# Patient Record
Sex: FEMALE | Race: WHITE | HISPANIC OR LATINO | Employment: UNEMPLOYED | URBAN - METROPOLITAN AREA
[De-identification: names, ages, dates, MRNs, and addresses within clinical notes are randomized per-mention and may not be internally consistent; named-entity substitution may affect disease eponyms.]

---

## 2018-08-01 ENCOUNTER — APPOINTMENT (EMERGENCY)
Dept: RADIOLOGY | Facility: HOSPITAL | Age: 53
DRG: 194 | End: 2018-08-01
Payer: COMMERCIAL

## 2018-08-01 ENCOUNTER — HOSPITAL ENCOUNTER (INPATIENT)
Facility: HOSPITAL | Age: 53
LOS: 2 days | Discharge: HOME | DRG: 194 | End: 2018-08-03
Attending: EMERGENCY MEDICINE | Admitting: INTERNAL MEDICINE
Payer: COMMERCIAL

## 2018-08-01 DIAGNOSIS — I50.9 ACUTE CHF (CONGESTIVE HEART FAILURE) (HCC): Primary | ICD-10-CM

## 2018-08-01 DIAGNOSIS — I50.33 ACUTE ON CHRONIC DIASTOLIC HEART FAILURE (HCC): ICD-10-CM

## 2018-08-01 PROBLEM — I10 HYPERTENSION: Status: ACTIVE | Noted: 2018-08-01

## 2018-08-01 LAB
ALBUMIN SERPL BCP-MCNC: 3.7 G/DL (ref 3.5–5)
ALP SERPL-CCNC: 115 U/L (ref 46–116)
ALT SERPL W P-5'-P-CCNC: 28 U/L (ref 12–78)
ANION GAP SERPL CALCULATED.3IONS-SCNC: 7 MMOL/L (ref 4–13)
APTT PPP: 27 SECONDS (ref 24–36)
AST SERPL W P-5'-P-CCNC: 26 U/L (ref 5–45)
ATRIAL RATE: 83 BPM
BACTERIA UR QL AUTO: ABNORMAL /HPF
BASOPHILS # BLD AUTO: 0.1 THOUSANDS/ΜL (ref 0–0.1)
BASOPHILS NFR BLD AUTO: 1 % (ref 0–1)
BILIRUB DIRECT SERPL-MCNC: 0.04 MG/DL (ref 0–0.2)
BILIRUB SERPL-MCNC: 0.3 MG/DL (ref 0.2–1)
BILIRUB UR QL STRIP: NEGATIVE
BUN SERPL-MCNC: 13 MG/DL (ref 5–25)
CALCIUM SERPL-MCNC: 8.8 MG/DL (ref 8.3–10.1)
CHLORIDE SERPL-SCNC: 102 MMOL/L (ref 100–108)
CLARITY UR: CLEAR
CO2 SERPL-SCNC: 30 MMOL/L (ref 21–32)
COLOR UR: YELLOW
CREAT SERPL-MCNC: 0.74 MG/DL (ref 0.6–1.3)
EOSINOPHIL # BLD AUTO: 0.4 THOUSAND/ΜL (ref 0–0.61)
EOSINOPHIL NFR BLD AUTO: 4 % (ref 0–6)
ERYTHROCYTE [DISTWIDTH] IN BLOOD BY AUTOMATED COUNT: 14.3 % (ref 11.6–15.1)
GFR SERPL CREATININE-BSD FRML MDRD: 95 ML/MIN/1.73SQ M
GLUCOSE SERPL-MCNC: 91 MG/DL (ref 65–140)
GLUCOSE UR STRIP-MCNC: NEGATIVE MG/DL
HCT VFR BLD AUTO: 40.4 % (ref 34.8–46.1)
HGB BLD-MCNC: 12.6 G/DL (ref 11.5–15.4)
HGB UR QL STRIP.AUTO: NEGATIVE
IMM GRANULOCYTES # BLD AUTO: 0.03 THOUSAND/UL (ref 0–0.2)
IMM GRANULOCYTES NFR BLD AUTO: 0 % (ref 0–2)
INR PPP: 1.07 (ref 0.86–1.17)
KETONES UR STRIP-MCNC: NEGATIVE MG/DL
LEUKOCYTE ESTERASE UR QL STRIP: ABNORMAL
LYMPHOCYTES # BLD AUTO: 4.75 THOUSANDS/ΜL (ref 0.6–4.47)
LYMPHOCYTES NFR BLD AUTO: 42 % (ref 14–44)
MCH RBC QN AUTO: 27.3 PG (ref 26.8–34.3)
MCHC RBC AUTO-ENTMCNC: 31.2 G/DL (ref 31.4–37.4)
MCV RBC AUTO: 88 FL (ref 82–98)
MONOCYTES # BLD AUTO: 1.06 THOUSAND/ΜL (ref 0.17–1.22)
MONOCYTES NFR BLD AUTO: 10 % (ref 4–12)
NEUTROPHILS # BLD AUTO: 4.85 THOUSANDS/ΜL (ref 1.85–7.62)
NEUTS SEG NFR BLD AUTO: 43 % (ref 43–75)
NITRITE UR QL STRIP: NEGATIVE
NON-SQ EPI CELLS URNS QL MICRO: ABNORMAL /HPF
NRBC BLD AUTO-RTO: 0 /100 WBCS
NT-PROBNP SERPL-MCNC: 1303 PG/ML
P AXIS: 65 DEGREES
PH UR STRIP.AUTO: 6.5 [PH] (ref 4.5–8)
PLATELET # BLD AUTO: 435 THOUSANDS/UL (ref 149–390)
PMV BLD AUTO: 9.9 FL (ref 8.9–12.7)
POTASSIUM SERPL-SCNC: 3.5 MMOL/L (ref 3.5–5.3)
PR INTERVAL: 152 MS
PROT SERPL-MCNC: 8.3 G/DL (ref 6.4–8.2)
PROT UR STRIP-MCNC: NEGATIVE MG/DL
PROTHROMBIN TIME: 13.8 SECONDS (ref 11.8–14.2)
QRS AXIS: 80 DEGREES
QRSD INTERVAL: 88 MS
QT INTERVAL: 420 MS
QTC INTERVAL: 493 MS
RBC # BLD AUTO: 4.61 MILLION/UL (ref 3.81–5.12)
RBC #/AREA URNS AUTO: ABNORMAL /HPF
SODIUM SERPL-SCNC: 139 MMOL/L (ref 136–145)
SP GR UR STRIP.AUTO: 1.01 (ref 1–1.03)
T WAVE AXIS: -47 DEGREES
TROPONIN I SERPL-MCNC: 0.02 NG/ML
UROBILINOGEN UR QL STRIP.AUTO: 1 E.U./DL
VENTRICULAR RATE: 83 BPM
WBC # BLD AUTO: 11.19 THOUSAND/UL (ref 4.31–10.16)
WBC #/AREA URNS AUTO: ABNORMAL /HPF

## 2018-08-01 PROCEDURE — 80076 HEPATIC FUNCTION PANEL: CPT | Performed by: PHYSICIAN ASSISTANT

## 2018-08-01 PROCEDURE — 81001 URINALYSIS AUTO W/SCOPE: CPT | Performed by: PHYSICIAN ASSISTANT

## 2018-08-01 PROCEDURE — 84484 ASSAY OF TROPONIN QUANT: CPT | Performed by: PHYSICIAN ASSISTANT

## 2018-08-01 PROCEDURE — 85730 THROMBOPLASTIN TIME PARTIAL: CPT | Performed by: PHYSICIAN ASSISTANT

## 2018-08-01 PROCEDURE — 83880 ASSAY OF NATRIURETIC PEPTIDE: CPT | Performed by: PHYSICIAN ASSISTANT

## 2018-08-01 PROCEDURE — 36415 COLL VENOUS BLD VENIPUNCTURE: CPT | Performed by: PHYSICIAN ASSISTANT

## 2018-08-01 PROCEDURE — 93010 ELECTROCARDIOGRAM REPORT: CPT | Performed by: INTERNAL MEDICINE

## 2018-08-01 PROCEDURE — 93005 ELECTROCARDIOGRAM TRACING: CPT

## 2018-08-01 PROCEDURE — 85025 COMPLETE CBC W/AUTO DIFF WBC: CPT | Performed by: PHYSICIAN ASSISTANT

## 2018-08-01 PROCEDURE — 71046 X-RAY EXAM CHEST 2 VIEWS: CPT

## 2018-08-01 PROCEDURE — 85610 PROTHROMBIN TIME: CPT | Performed by: PHYSICIAN ASSISTANT

## 2018-08-01 PROCEDURE — 80048 BASIC METABOLIC PNL TOTAL CA: CPT | Performed by: PHYSICIAN ASSISTANT

## 2018-08-01 RX ORDER — AMLODIPINE BESYLATE 10 MG/1
10 TABLET ORAL DAILY
COMMUNITY
Start: 2018-06-12 | End: 2018-11-02

## 2018-08-01 RX ORDER — FUROSEMIDE 10 MG/ML
40 INJECTION INTRAMUSCULAR; INTRAVENOUS ONCE
Status: COMPLETED | OUTPATIENT
Start: 2018-08-01 | End: 2018-08-02

## 2018-08-01 RX ORDER — VALACYCLOVIR HYDROCHLORIDE 500 MG/1
500 TABLET, FILM COATED ORAL
COMMUNITY
End: 2018-08-01

## 2018-08-01 RX ORDER — FUROSEMIDE 10 MG/ML
20 INJECTION INTRAMUSCULAR; INTRAVENOUS
Status: DISCONTINUED | OUTPATIENT
Start: 2018-08-02 | End: 2018-08-03 | Stop reason: HOSPADM

## 2018-08-01 RX ORDER — CITALOPRAM 20 MG/1
40 TABLET ORAL
Status: DISCONTINUED | OUTPATIENT
Start: 2018-08-01 | End: 2018-08-03 | Stop reason: HOSPADM

## 2018-08-01 RX ORDER — AMLODIPINE BESYLATE 10 MG/1
10 TABLET ORAL
Status: DISCONTINUED | OUTPATIENT
Start: 2018-08-01 | End: 2018-08-03 | Stop reason: HOSPADM

## 2018-08-01 RX ORDER — ACETAMINOPHEN 325 MG/1
650 TABLET ORAL EVERY 4 HOURS PRN
Status: DISCONTINUED | OUTPATIENT
Start: 2018-08-01 | End: 2018-08-03 | Stop reason: HOSPADM

## 2018-08-01 RX ORDER — ATORVASTATIN CALCIUM 40 MG/1
80 TABLET, FILM COATED ORAL
Status: DISCONTINUED | OUTPATIENT
Start: 2018-08-02 | End: 2018-08-03 | Stop reason: HOSPADM

## 2018-08-01 RX ORDER — ASPIRIN 81 MG/1
81 TABLET ORAL DAILY
COMMUNITY

## 2018-08-01 RX ORDER — ASPIRIN 81 MG/1
81 TABLET, CHEWABLE ORAL
Status: DISCONTINUED | OUTPATIENT
Start: 2018-08-01 | End: 2018-08-03 | Stop reason: HOSPADM

## 2018-08-01 RX ORDER — PANTOPRAZOLE SODIUM 40 MG/1
40 TABLET, DELAYED RELEASE ORAL DAILY
COMMUNITY
Start: 2018-06-12 | End: 2021-02-15 | Stop reason: SDUPTHER

## 2018-08-01 RX ORDER — CITALOPRAM 40 MG/1
40 TABLET ORAL DAILY
COMMUNITY
Start: 2018-06-12

## 2018-08-01 RX ORDER — PANTOPRAZOLE SODIUM 40 MG/1
40 TABLET, DELAYED RELEASE ORAL
Status: DISCONTINUED | OUTPATIENT
Start: 2018-08-02 | End: 2018-08-03 | Stop reason: HOSPADM

## 2018-08-01 RX ORDER — POTASSIUM CHLORIDE 20 MEQ/1
40 TABLET, EXTENDED RELEASE ORAL ONCE
Status: COMPLETED | OUTPATIENT
Start: 2018-08-01 | End: 2018-08-01

## 2018-08-01 RX ORDER — ATORVASTATIN CALCIUM 80 MG/1
80 TABLET, FILM COATED ORAL DAILY
COMMUNITY
Start: 2018-06-12 | End: 2018-11-02 | Stop reason: SDUPTHER

## 2018-08-01 RX ADMIN — ASPIRIN 81 MG 81 MG: 81 TABLET ORAL at 23:58

## 2018-08-01 RX ADMIN — POTASSIUM CHLORIDE 40 MEQ: 1500 TABLET, EXTENDED RELEASE ORAL at 23:58

## 2018-08-01 RX ADMIN — AMLODIPINE BESYLATE 10 MG: 10 TABLET ORAL at 23:58

## 2018-08-01 RX ADMIN — CITALOPRAM HYDROBROMIDE 40 MG: 20 TABLET ORAL at 23:58

## 2018-08-02 ENCOUNTER — APPOINTMENT (INPATIENT)
Dept: NON INVASIVE DIAGNOSTICS | Facility: HOSPITAL | Age: 53
DRG: 194 | End: 2018-08-02
Payer: COMMERCIAL

## 2018-08-02 PROBLEM — R94.31 ABNORMAL EKG: Status: ACTIVE | Noted: 2018-08-02

## 2018-08-02 PROBLEM — M79.89 FOOT SWELLING: Status: ACTIVE | Noted: 2018-08-02

## 2018-08-02 LAB
ANION GAP SERPL CALCULATED.3IONS-SCNC: 9 MMOL/L (ref 4–13)
BASOPHILS # BLD AUTO: 0.13 THOUSANDS/ΜL (ref 0–0.1)
BASOPHILS NFR BLD AUTO: 1 % (ref 0–1)
BUN SERPL-MCNC: 13 MG/DL (ref 5–25)
CALCIUM SERPL-MCNC: 9.1 MG/DL (ref 8.3–10.1)
CHLORIDE SERPL-SCNC: 100 MMOL/L (ref 100–108)
CHOLEST SERPL-MCNC: 155 MG/DL (ref 50–200)
CO2 SERPL-SCNC: 27 MMOL/L (ref 21–32)
CREAT SERPL-MCNC: 0.78 MG/DL (ref 0.6–1.3)
EOSINOPHIL # BLD AUTO: 0.43 THOUSAND/ΜL (ref 0–0.61)
EOSINOPHIL NFR BLD AUTO: 4 % (ref 0–6)
ERYTHROCYTE [DISTWIDTH] IN BLOOD BY AUTOMATED COUNT: 14.2 % (ref 11.6–15.1)
GFR SERPL CREATININE-BSD FRML MDRD: 89 ML/MIN/1.73SQ M
GLUCOSE SERPL-MCNC: 134 MG/DL (ref 65–140)
HCT VFR BLD AUTO: 44 % (ref 34.8–46.1)
HDLC SERPL-MCNC: 39 MG/DL (ref 40–60)
HGB BLD-MCNC: 13.7 G/DL (ref 11.5–15.4)
IMM GRANULOCYTES # BLD AUTO: 0.03 THOUSAND/UL (ref 0–0.2)
IMM GRANULOCYTES NFR BLD AUTO: 0 % (ref 0–2)
LDLC SERPL CALC-MCNC: 88 MG/DL (ref 0–100)
LYMPHOCYTES # BLD AUTO: 3.61 THOUSANDS/ΜL (ref 0.6–4.47)
LYMPHOCYTES NFR BLD AUTO: 30 % (ref 14–44)
MCH RBC QN AUTO: 27.4 PG (ref 26.8–34.3)
MCHC RBC AUTO-ENTMCNC: 31.1 G/DL (ref 31.4–37.4)
MCV RBC AUTO: 88 FL (ref 82–98)
MONOCYTES # BLD AUTO: 1.37 THOUSAND/ΜL (ref 0.17–1.22)
MONOCYTES NFR BLD AUTO: 11 % (ref 4–12)
NEUTROPHILS # BLD AUTO: 6.53 THOUSANDS/ΜL (ref 1.85–7.62)
NEUTS SEG NFR BLD AUTO: 54 % (ref 43–75)
NRBC BLD AUTO-RTO: 0 /100 WBCS
PLATELET # BLD AUTO: 439 THOUSANDS/UL (ref 149–390)
PMV BLD AUTO: 10.1 FL (ref 8.9–12.7)
POTASSIUM SERPL-SCNC: 3.5 MMOL/L (ref 3.5–5.3)
RBC # BLD AUTO: 5 MILLION/UL (ref 3.81–5.12)
SODIUM SERPL-SCNC: 136 MMOL/L (ref 136–145)
TRIGL SERPL-MCNC: 142 MG/DL
TROPONIN I SERPL-MCNC: 0.02 NG/ML
TROPONIN I SERPL-MCNC: 0.02 NG/ML
WBC # BLD AUTO: 12.1 THOUSAND/UL (ref 4.31–10.16)

## 2018-08-02 PROCEDURE — 99223 1ST HOSP IP/OBS HIGH 75: CPT | Performed by: INTERNAL MEDICINE

## 2018-08-02 PROCEDURE — 93306 TTE W/DOPPLER COMPLETE: CPT | Performed by: INTERNAL MEDICINE

## 2018-08-02 PROCEDURE — 36415 COLL VENOUS BLD VENIPUNCTURE: CPT | Performed by: NURSE PRACTITIONER

## 2018-08-02 PROCEDURE — 85025 COMPLETE CBC W/AUTO DIFF WBC: CPT | Performed by: NURSE PRACTITIONER

## 2018-08-02 PROCEDURE — 84484 ASSAY OF TROPONIN QUANT: CPT | Performed by: NURSE PRACTITIONER

## 2018-08-02 PROCEDURE — 80048 BASIC METABOLIC PNL TOTAL CA: CPT | Performed by: NURSE PRACTITIONER

## 2018-08-02 PROCEDURE — 99254 IP/OBS CNSLTJ NEW/EST MOD 60: CPT | Performed by: INTERNAL MEDICINE

## 2018-08-02 PROCEDURE — 80061 LIPID PANEL: CPT | Performed by: NURSE PRACTITIONER

## 2018-08-02 PROCEDURE — 93306 TTE W/DOPPLER COMPLETE: CPT

## 2018-08-02 PROCEDURE — 99285 EMERGENCY DEPT VISIT HI MDM: CPT

## 2018-08-02 RX ADMIN — CITALOPRAM HYDROBROMIDE 40 MG: 20 TABLET ORAL at 22:03

## 2018-08-02 RX ADMIN — FUROSEMIDE 20 MG: 10 INJECTION, SOLUTION INTRAMUSCULAR; INTRAVENOUS at 08:31

## 2018-08-02 RX ADMIN — ATORVASTATIN CALCIUM 80 MG: 40 TABLET, FILM COATED ORAL at 15:52

## 2018-08-02 RX ADMIN — AMLODIPINE BESYLATE 10 MG: 10 TABLET ORAL at 22:03

## 2018-08-02 RX ADMIN — PANTOPRAZOLE SODIUM 40 MG: 40 TABLET, DELAYED RELEASE ORAL at 06:37

## 2018-08-02 RX ADMIN — ENOXAPARIN SODIUM 40 MG: 40 INJECTION SUBCUTANEOUS at 08:32

## 2018-08-02 RX ADMIN — FUROSEMIDE 40 MG: 10 INJECTION, SOLUTION INTRAMUSCULAR; INTRAVENOUS at 00:00

## 2018-08-02 RX ADMIN — ASPIRIN 81 MG 81 MG: 81 TABLET ORAL at 22:03

## 2018-08-02 NOTE — PLAN OF CARE
Problem: Nutrition/Hydration-ADULT  Goal: Nutrient/Hydration intake appropriate for improving, restoring or maintaining nutritional needs  Monitor and assess patient's nutrition/hydration status for malnutrition (ex- brittle hair, bruises, dry skin, pale skin and conjunctiva, muscle wasting, smooth red tongue, and disorientation)  Collaborate with interdisciplinary team and initiate plan and interventions as ordered  Monitor patient's weight and dietary intake as ordered or per policy  Utilize nutrition screening tool and intervene per policy  Determine patient's food preferences and provide high-protein, high-caloric foods as appropriate  INTERVENTIONS:  - Monitor oral intake, urinary output, labs, and treatment plans  - Assess nutrition and hydration status and recommend course of action  - Evaluate amount of meals eaten  - Assist patient with eating if necessary   - Allow adequate time for meals  - Recommend/ encourage appropriate diets, oral nutritional supplements, and vitamin/mineral supplements  - Order, calculate, and assess calorie counts as needed  - Recommend, monitor, and adjust tube feedings and TPN/PPN based on assessed needs  - Assess need for intravenous fluids  - Provide specific nutrition/hydration education as appropriate  - Include patient/family/caregiver in decisions related to nutrition  Outcome: Progressing  Goal: Patient will comply with prescribed diet and consume no more than 2gm Na per day

## 2018-08-02 NOTE — ASSESSMENT & PLAN NOTE
In the setting of acute and chronic Congestive Heart Failure  See workup above  Denies Hawa signs, Encouraged elevated on pillows

## 2018-08-02 NOTE — ASSESSMENT & PLAN NOTE
Acute on chronic present on admission  BNP greater than 1300  EKG reviewed note some ST abnormality  Chest x-ray reviewed, mild vascular congestion noted  Lasix IV  Monitor intake and output, low-sodium diet, daily weight  Cardiology consult further management  Telemetry  Encourage Congestive Heart Failure, low-sodium diet    Consult Nutrition

## 2018-08-02 NOTE — CASE MANAGEMENT
Initial Clinical Review    Admission: Date/Time/Statement: 8/1/18 @ 2308     Orders Placed This Encounter   Procedures    Inpatient Admission (expected length of stay for this patient is greater than two midnights)     Standing Status:   Standing     Number of Occurrences:   1     Order Specific Question:   Admitting Physician     Answer:   Jacqueline Faulkner [[id number]]     Order Specific Question:   Level of Care     Answer:   Med Surg [[id number]]     Order Specific Question:   Estimated length of stay     Answer:   More than 2 Midnights     Order Specific Question:   Certification     Answer:   I certify that inpatient services are medically necessary for this patient for a duration of greater than two midnights  See H&P and MD Progress Notes for additional information about the patient's course of treatment  ED: Date/Time/Mode of Arrival:   ED Arrival Information     Expected Arrival Acuity Means of Arrival Escorted By Service Admission Type    - 8/1/2018 20:34 Urgent Walk-In Family Member General Medicine Urgent    Arrival Complaint    FEET SWELLING           Chief Complaint:   Chief Complaint   Patient presents with    Foot Swelling     Patient c/o bilateral feet and ankle swelling for a few days         History of Illness:  50 y o  female history of Congestive Heart Failure, hypertension, anxiety who presents with chief complain of bilateral feet and ankle swelling onset a few days as well as dyspnea with exertion onset 3 days     ED Vital Signs:   ED Triage Vitals   Temperature Pulse Respirations Blood Pressure SpO2   08/01/18 2139 08/01/18 2042 08/01/18 2042 08/01/18 2043 08/01/18 2042   98 3 °F (36 8 °C) 89 20 147/67 98 %      Temp Source Heart Rate Source Patient Position - Orthostatic VS BP Location FiO2 (%)   08/01/18 2139 08/01/18 2042 08/01/18 2042 08/01/18 2042 --   Oral Monitor Lying Right arm       Pain Score       08/02/18 1300       No Pain        Wt Readings from Last 1 Encounters:   08/02/18 79 8 kg (176 lb)     Abnormal Labs/Diagnostic Test Results: NT-pro BNP 1,303, WBC 11 19, platelets 735    CXR: Mild vascular congestion  ED Treatment:   Medication Administration from 08/01/2018 2034 to 08/02/2018 1307       Date/Time Order Dose Route Action Action by Comments     08/02/2018 0000 furosemide (LASIX) injection 40 mg 40 mg Intravenous Given Marek Velasco RN      08/01/2018 2358 potassium chloride (K-DUR,KLOR-CON) CR tablet 40 mEq 40 mEq Oral Given Marek Velasco RN      08/01/2018 2358 amLODIPine (NORVASC) tablet 10 mg 10 mg Oral Given Marek Velasco RN      08/01/2018 2358 aspirin chewable tablet 81 mg 81 mg Oral Given Marek Velasco RN      08/01/2018 2358 citalopram (CeleXA) tablet 40 mg 40 mg Oral Given Marek Velasco RN      08/02/2018 0637 pantoprazole (PROTONIX) EC tablet 40 mg 40 mg Oral Given Marek Velasco RN      08/02/2018 0831 furosemide (LASIX) injection 20 mg 20 mg Intravenous Given Alex Christopher RN      08/02/2018 0832 enoxaparin (LOVENOX) subcutaneous injection 40 mg 40 mg Subcutaneous Given Alex Christopher RN           Past Medical/Surgical History: Active Ambulatory Problems     Diagnosis Date Noted    No Active Ambulatory Problems     Resolved Ambulatory Problems     Diagnosis Date Noted    No Resolved Ambulatory Problems     Past Medical History:   Diagnosis Date    Anxiety     Cancer ([facility] )     CHF (congestive heart failure) ([facility] )     Hodgkin's disease ([place] [address] )     Hypertension        Admitting Diagnosis: Foot swelling [M79 89]  Acute CHF (congestive heart failure) (HCC) [I50 9]    Age/Sex: 50 y o  female    Assessment/Plan:     CHF (congestive heart failure) ([facility] )   Assessment & Plan     Acute on chronic present on admission  BNP greater than 1300  EKG reviewed note some ST abnormality  Chest x-ray reviewed, mild vascular congestion noted  Lasix IV  Monitor intake and output, low-sodium diet, daily weight    Cardiology consult further management  Telemetry  Encourage Congestive Heart Failure, low-sodium diet  Consult Nutrition           Abnormal EKG   Assessment & Plan     Denies chest pain  Consult Cardiology for further input  Trend troponin  Telemetry           Hypertension   Assessment & Plan     Continue Norvasc          * Foot swelling   Assessment & Plan     In the setting of acute and chronic Congestive Heart Failure  See workup above  Denies Hawa signs, Encouraged elevated on pillows              VTE Prophylaxis: Enoxaparin (Lovenox)  / sequential compression device   Code Status:  Full code  POLST: POLST form is not discussed and not completed at this time  Discussion with family:  No family at bedside     Anticipated Length of Stay:  Patient will be admitted on an Inpatient basis with an anticipated length of stay of  greater than 2 midnights     Justification for Hospital Stay:  Congestive heart failure, IV Lasix      Admission Orders:  Inpatient/Tele  Continuous Cardiac Monitoring  Serial Cardiac Enzymes q3h x 3  Consult cardiology r/e acute CHF   Bilateral Sequential Compression Device  Echocardiogram  Daily Weights  Strict I&O    Scheduled Meds:   Current Facility-Administered Medications:  amLODIPine 10 mg Oral HS   aspirin 81 mg Oral HS   atorvastatin 80 mg Oral Daily With Dinner   citalopram 40 mg Oral HS   enoxaparin 40 mg Subcutaneous Q24H KEISHA   furosemide 20 mg Intravenous BID (diuretic)   pantoprazole 40 mg Oral Early Morning     PRN Meds:   acetaminophen

## 2018-08-02 NOTE — CONSULTS
Consultation - Cardiology Team One  Cindy Alejandre 50 y o  female MRN: [id number]  Unit/Bed#: -01 Encounter: [id number]    Consults    Physician Requesting Consult: Taylor Correa DO  Reason for Consult / Principal Problem: chf    HPI: Cardiologist Dr Sabine Correa is a 50y o  year old female who has a history of hypertension, anxiety, history of pericardial effusion status post pericardial window 2011  Pt came to the ed with complaints of lower extremity and ankle swelling  She states it had started a few days prior  She also noted shortness of breath on exertion  She had no chest pain, pressure, heaviness  She has no personal history of cad  She does admit to eating a lot of salty foods lately  Constitutional:  Denies fever or chills   Eyes:  Denies change in visual acuity   HENT:  Denies nasal congestion or sore throat   Respiratory: +shortness of breath  Cardiovascular: +edema  Denies chest pain  GI:  Denies abdominal pain, nausea, vomiting, bloody stools or diarrhea   :  Denies dysuria, frequency, difficulty in micturition and nocturia  Musculoskeletal:  Denies back pain or joint pain   Neurologic:  Denies headache, focal weakness or sensory changes   Endocrine:  Denies polyuria or polydipsia   Lymphatic:  Denies swollen glands   Psychiatric:  Denies depression or anxiety     Historical Information   Past Medical History:   Diagnosis Date    Anxiety     Cancer ([facility] )     CHF (congestive heart failure) (HCC)     Hodgkin's disease ([facility] )     Hypertension      Past Surgical History:   Procedure Laterality Date    CARDIAC SURGERY      COLON SURGERY      HYSTERECTOMY      NECK SURGERY      TUMOR REMOVAL       History   Alcohol Use No     History   Drug Use No     History   Smoking Status    Never Smoker   Smokeless Tobacco    Never Used       Family History: History reviewed  No pertinent family history      MEDS & ALLERGIES:  all current active meds have been reviewed and current meds: Current Facility-Administered Medications   Medication Dose Route Frequency    acetaminophen (TYLENOL) tablet 650 mg  650 mg Oral Q4H PRN    amLODIPine (NORVASC) tablet 10 mg  10 mg Oral HS    aspirin chewable tablet 81 mg  81 mg Oral HS    atorvastatin (LIPITOR) tablet 80 mg  80 mg Oral Daily With Dinner    citalopram (CeleXA) tablet 40 mg  40 mg Oral HS    enoxaparin (LOVENOX) subcutaneous injection 40 mg  40 mg Subcutaneous Q24H KEISHA    furosemide (LASIX) injection 20 mg  20 mg Intravenous BID (diuretic)    pantoprazole (PROTONIX) EC tablet 40 mg  40 mg Oral Early Morning        No Known Allergies    OBJECTIVE:  Vitals:   Vitals:    08/02/18 1315   BP: 115/64   Pulse: 87   Resp: 18   Temp: 99 °F (37 2 °C)   SpO2: 94%     Body mass index is 30 21 kg/m²  Systolic (27XNG), PRF:243 , Min:114 , FLM:168     Diastolic (72PXV), WKC:03, Min:59, Max:74    No intake or output data in the 24 hours ending 08/02/18 1408  Weight (last 2 days)     Date/Time   Weight    08/01/18 2042  79 8 (176)            Invasive Devices     Peripheral Intravenous Line            Peripheral IV 08/01/18 Right Hand less than 1 day                PHYSICAL EXAMS:  General:  Patient is not in acute distress, laying in the bed comfortably, awake, alert responding to commands  Head: Normocephalic, Atraumatic  HEENT: White sclera, pink conjunctiva,  PERRLA,pharynx benign  Neck:  Supple, no neck vein distention, carotids+2/+2 no bruits, thyromegaly, adenopathy  Respiratory: decreased breath sounds bilaterally  Cardiovascular:  PMI normal, S1-S2 normal, +3/6 murmur noted  Regular rhythm  GI:  Abdomen soft nontender   No hepatosplenomegaly, adenopathy, ascites,or rebound tenderness  Extremities:Trace edema, normal pulses, no calf tenderness, no joint deformities, no venous disease   Integument:  No skin rashes or ulceration  Lymphatic:  No cervical or inguinal lymphadenopathy  Neurologic:  Patient is awake alert, responding to command, well-oriented to time and place and person moving all extremities    LABORATORY RESULTS:    Results from last 7 days  Lab Units 08/02/18  0450 08/02/18  0137 08/01/18  2157   TROPONIN I ng/mL 0 02 0 02 0 02     CBC with diff:   Results from last 7 days  Lab Units 08/02/18  0450 08/01/18  2157   WBC Thousand/uL 12 10* 11 19*   HEMOGLOBIN g/dL 13 7 12 6   HEMATOCRIT % 44 0 40 4   MCV fL 88 88   PLATELETS Thousands/uL 439* 435*   MCH pg 27 4 27 3   MCHC g/dL 31 1* 31 2*   RDW % 14 2 14 3   MPV fL 10 1 9 9   NRBC AUTO /100 WBCs 0 0       CMP:  Results from last 7 days  Lab Units 08/02/18  0450 08/01/18  2157   SODIUM mmol/L 136 139   POTASSIUM mmol/L 3 5 3 5   CHLORIDE mmol/L 100 102   CO2 mmol/L 27 30   ANION GAP mmol/L 9 7   BUN mg/dL 13 13   CREATININE mg/dL 0 78 0 74   GLUCOSE RANDOM mg/dL 134 91   CALCIUM mg/dL 9 1 8 8   AST U/L  --  26   ALT U/L  --  28   ALK PHOS U/L  --  115   TOTAL PROTEIN g/dL  --  8 3*   BILIRUBIN TOTAL mg/dL  --  0 30   EGFR ml/min/1 73sq m 89 95       BMP:  Results from last 7 days  Lab Units 08/02/18  0450 08/01/18  2157   SODIUM mmol/L 136 139   POTASSIUM mmol/L 3 5 3 5   CHLORIDE mmol/L 100 102   CO2 mmol/L 27 30   BUN mg/dL 13 13   CREATININE mg/dL 0 78 0 74   GLUCOSE RANDOM mg/dL 134 91   CALCIUM mg/dL 9 1 8 8         Results from last 7 days  Lab Units 08/01/18  2157   NT-PRO BNP pg/mL 1,303*                    Results from last 7 days  Lab Units 08/01/18  2157   INR  1 07       Lipid Profile:   Lab Results   Component Value Date    CHOL 155 08/02/2018     Lab Results   Component Value Date    HDL 39 (L) 08/02/2018     Lab Results   Component Value Date    LDLCALC 88 08/02/2018     Lab Results   Component Value Date    TRIG 142 08/02/2018       Cardiac testing:   No results found for this or any previous visit  No results found for this or any previous visit  No procedure found  No results found for this or any previous visit        Imaging:   I have personally reviewed pertinent reports  EKG reviewed personally:  connie Barragan    Telemetry reviewed personally:       Assessment/Plan:  1-Acute congestive heart failure, systolic versus diastolic to be determined  Echocardiogram pending  Continue with IV Lasix 20 mg b i d   Continue with amlodipine, aspirin, Lipitor  Daily weights  Salt restriction  Strict I&Os  2-murmur, echocardiogram pending  Patient states that she followed with cardiologist before moving to the area  3-hypertension, stable  Continue all medications  Code Status: Level 1 - Full Code    Counseling / Coordination of Care  Total floor / unit time spent today 35 minutes  Greater than 50% of total time was spent with the patient and / or family counseling and / or coordination of care  A description of the counseling / coordination of care: Review of history, current assessment, development of a plan      Shanika Harman PA-C  8/2/2018,2:08 PM

## 2018-08-02 NOTE — ED PROVIDER NOTES
History  Chief Complaint   Patient presents with    Foot Swelling     Patient c/o bilateral feet and ankle swelling for a few days  19-year-old female here for 3 days of increasing lower leg edema  Has history of heart failure  She now states she is feeling somewhat short of breath but thinks it could be her nerves  She does not take any diuretic on a daily basis  She states the last time this happened she ended up being hospitalized for heart failure  She has not seen a cardiologist in over a year  Denies any chest pain  No nausea vomiting  She states she just feels off  No recent travels traumas or surgeries  She is frequently on her feet for extended periods of time  She also notes she has been outside in the heat frequently  No calf pain  No history of PE or DVT  History provided by:  Patient   used: No    Leg Pain   Lower extremity pain location: Bilateral   Time since incident:  3 days  Injury: no    Pain details:     Severity:  No pain  Chronicity:  New  Dislocation: no    Foreign body present:  No foreign bodies  Tetanus status:  Up to date  Prior injury to area:  No  Relieved by:  Nothing  Worsened by:  Nothing  Ineffective treatments:  None tried  Associated symptoms: swelling (Bilateral lower extremities)    Associated symptoms: no back pain, no decreased ROM, no fatigue, no fever, no itching, no muscle weakness, no neck pain, no numbness, no stiffness and no tingling    Risk factors: no concern for non-accidental trauma, no frequent fractures, no known bone disorder, no obesity and no recent illness        Prior to Admission Medications   Prescriptions Last Dose Informant Patient Reported? Taking?    amLODIPine (NORVASC) 10 mg tablet   Yes Yes   Sig: Take 10 mg by mouth   aspirin (ECOTRIN LOW STRENGTH) 81 mg EC tablet   Yes Yes   Sig: Take 81 mg by mouth daily   atorvastatin (LIPITOR) 80 mg tablet   Yes Yes   Sig: Take 80 mg by mouth   citalopram (CeleXA) 40 mg tablet   Yes Yes   Sig: Take 40 mg by mouth   pantoprazole (PROTONIX) 40 mg tablet   Yes Yes   Sig: Take 40 mg by mouth   valACYclovir (VALTREX) 500 mg tablet   Yes No   Sig: Take 500 mg by mouth      Facility-Administered Medications: None       Past Medical History:   Diagnosis Date    Anxiety     Cancer ([place] [address] )     CHF (congestive heart failure) ([place] [address] )     Hodgkin's disease ([place] [address] )     Hypertension        Past Surgical History:   Procedure Laterality Date    CARDIAC SURGERY      COLON SURGERY      HYSTERECTOMY      NECK SURGERY      TUMOR REMOVAL         History reviewed  No pertinent family history  I have reviewed and agree with the history as documented  Social History   Substance Use Topics    Smoking status: Never Smoker    Smokeless tobacco: Never Used    Alcohol use No        Review of Systems   Constitutional: Negative for activity change, appetite change, chills, diaphoresis, fatigue, fever and unexpected weight change  HENT: Negative for congestion, rhinorrhea, sinus pressure, sore throat and trouble swallowing  Eyes: Negative for photophobia and visual disturbance  Respiratory: Positive for shortness of breath  Negative for apnea, cough, choking, chest tightness, wheezing and stridor  Cardiovascular: Positive for leg swelling (Bilateral)  Negative for chest pain and palpitations  Gastrointestinal: Negative for abdominal distention, abdominal pain, blood in stool, constipation, diarrhea, nausea and vomiting  Genitourinary: Negative for decreased urine volume, difficulty urinating, dysuria, enuresis, flank pain, frequency, hematuria and urgency  Musculoskeletal: Negative for arthralgias, back pain, myalgias, neck pain, neck stiffness and stiffness  Skin: Negative for color change, itching, pallor, rash and wound  Allergic/Immunologic: Negative  Neurological: Negative for dizziness, tremors, syncope, weakness, light-headedness, numbness and headaches     Hematological: Negative  Psychiatric/Behavioral: Negative  All other systems reviewed and are negative  Physical Exam  Physical Exam   Constitutional: She is oriented to person, place, and time  She appears well-developed and well-nourished  Non-toxic appearance  She does not have a sickly appearance  She does not appear ill  No distress  HENT:   Head: Normocephalic and atraumatic  Eyes: EOM and lids are normal  Pupils are equal, round, and reactive to light  Neck: Normal range of motion  Neck supple  No nuchal rigidity or trismus  Cardiovascular: Normal rate, regular rhythm, S1 normal, S2 normal, intact distal pulses and normal pulses  Exam reveals no gallop, no distant heart sounds, no friction rub and no decreased pulses  Pulses:       Radial pulses are 2+ on the right side, and 2+ on the left side  Dorsalis pedis pulses are 2+ on the right side, and 2+ on the left side  Bilateral 1+ pitting edema  JVD present   Pulmonary/Chest: Effort normal and breath sounds normal  No accessory muscle usage  No apnea, no tachypnea and no bradypnea  No respiratory distress  She has no decreased breath sounds  She has no wheezes  She has no rhonchi  She has no rales  Abdominal: Soft  Normal appearance and bowel sounds are normal  She exhibits no distension and no mass  There is no tenderness  There is no rigidity, no rebound and no guarding  No hernia  Musculoskeletal: Normal range of motion  She exhibits no edema, tenderness or deformity  Neurological: She is alert and oriented to person, place, and time  No cranial nerve deficit  GCS eye subscore is 4  GCS verbal subscore is 5  GCS motor subscore is 6  GCS 15  AAOx3  Ambulating in department without difficulty  CN II-XII grossly intact  No focal neuro deficits  Skin: Skin is warm, dry and intact  No rash noted  She is not diaphoretic  No erythema  No pallor  Psychiatric: Her speech is normal    Nursing note and vitals reviewed        Vital Signs  ED Triage Vitals   Temperature Pulse Respirations Blood Pressure SpO2   08/01/18 2139 08/01/18 2042 08/01/18 2042 08/01/18 2043 08/01/18 2042   98 3 °F (36 8 °C) 89 20 147/67 98 %      Temp Source Heart Rate Source Patient Position - Orthostatic VS BP Location FiO2 (%)   08/01/18 2139 08/01/18 2042 08/01/18 2042 08/01/18 2042 --   Oral Monitor Lying Right arm       Pain Score       --                  Vitals:    08/01/18 2042 08/01/18 2043 08/01/18 2238   BP:  147/67 141/72   Pulse: 89  79   Patient Position - Orthostatic VS: Lying Lying Lying       Visual Acuity      ED Medications  Medications   furosemide (LASIX) injection 40 mg (not administered)   potassium chloride (K-DUR,KLOR-CON) CR tablet 40 mEq (not administered)       Diagnostic Studies  Results Reviewed     Procedure Component Value Units Date/Time    Hepatic function panel [[id number]]  (Abnormal) Collected:  08/01/18 2157    Lab Status:  Final result Specimen:  Blood from Hand, Left Updated:  08/01/18 2240     Total Bilirubin 0 30 mg/dL      Bilirubin, Direct 0 04 mg/dL      Alkaline Phosphatase 115 U/L      AST 26 U/L      ALT 28 U/L      Total Protein 8 3 (H) g/dL      Albumin 3 7 g/dL     B-type natriuretic peptide [[id number]]  (Abnormal) Collected:  08/01/18 2157    Lab Status:  Final result Specimen:  Blood from Hand, Left Updated:  08/01/18 2240     NT-proBNP 1,303 (H) pg/mL     Troponin I [[id number]]  (Normal) Collected:  08/01/18 2157    Lab Status:  Final result Specimen:  Blood from Arm, Left Updated:  08/01/18 2234     Troponin I 0 02 ng/mL     Basic metabolic panel [[id number]] Collected:  08/01/18 2157    Lab Status:  Final result Specimen:  Blood from Hand, Left Updated:  08/01/18 2233     Sodium 139 mmol/L      Potassium 3 5 mmol/L      Chloride 102 mmol/L      CO2 30 mmol/L      Anion Gap 7 mmol/L      BUN 13 mg/dL      Creatinine 0 74 mg/dL      Glucose 91 mg/dL      Calcium 8 8 mg/dL      eGFR 95 ml/min/1 73sq m     Narrative: National Kidney Disease Education Program recommendations are as follows:  GFR calculation is accurate only with a steady state creatinine  Chronic Kidney disease less than 60 ml/min/1 73 sq  meters  Kidney failure less than 15 ml/min/1 73 sq  meters      Protime-INR [[id number]]  (Normal) Collected:  08/01/18 2157    Lab Status:  Final result Specimen:  Blood from Arm, Left Updated:  08/01/18 2231     Protime 13 8 seconds      INR 1 07    APTT [[id number]]  (Normal) Collected:  08/01/18 2157    Lab Status:  Final result Specimen:  Blood from Arm, Left Updated:  08/01/18 2231     PTT 27 seconds     CBC and differential [[id number]]  (Abnormal) Collected:  08/01/18 2157    Lab Status:  Final result Specimen:  Blood from Hand, Left Updated:  08/01/18 2206     WBC 11 19 (H) Thousand/uL      RBC 4 61 Million/uL      Hemoglobin 12 6 g/dL      Hematocrit 40 4 %      MCV 88 fL      MCH 27 3 pg      MCHC 31 2 (L) g/dL      RDW 14 3 %      MPV 9 9 fL      Platelets 145 (H) Thousands/uL      nRBC 0 /100 WBCs      Neutrophils Relative 43 %      Immat GRANS % 0 %      Lymphocytes Relative 42 %      Monocytes Relative 10 %      Eosinophils Relative 4 %      Basophils Relative 1 %      Neutrophils Absolute 4 85 Thousands/µL      Immature Grans Absolute 0 03 Thousand/uL      Lymphocytes Absolute 4 75 (H) Thousands/µL      Monocytes Absolute 1 06 Thousand/µL      Eosinophils Absolute 0 40 Thousand/µL      Basophils Absolute 0 10 Thousands/µL     Urine Microscopic [[id number]]  (Abnormal) Collected:  08/01/18 2136    Lab Status:  Final result Specimen:  Urine from Urine, Clean Catch Updated:  08/01/18 2203     RBC, UA 0-1 (A) /hpf      WBC, UA 0-1 (A) /hpf      Epithelial Cells Occasional /hpf      Bacteria, UA None Seen /hpf     UA w Reflex to Microscopic w Reflex to Culture [[id number]]  (Abnormal) Collected:  08/01/18 2136    Lab Status:  Final result Specimen:  Urine from Urine, Clean Catch Updated:  08/01/18 2143     Color, UA Yellow Clarity, UA Clear     Specific Gravity, UA 1 015     pH, UA 6 5     Leukocytes, UA Small (A)     Nitrite, UA Negative     Protein, UA Negative mg/dl      Glucose, UA Negative mg/dl      Ketones, UA Negative mg/dl      Urobilinogen, UA 1 0 E U /dl      Bilirubin, UA Negative     Blood, UA Negative                 XR chest 2 views   Final Result by Johann Alaniz MD (08/01 2151)      Mild vascular congestion              Workstation performed: [id number]                    Procedures  ECG 12 Lead Documentation  Date/Time: 8/1/2018 9:23 PM  Performed by: Aura Hunt by: Kike Naranjo     Indications / Diagnosis:  Sob, leg swelling  ECG reviewed by me, the ED Provider: yes    Patient location:  ED  Previous ECG:     Previous ECG:  Unavailable    Comparison to cardiac monitor: Yes    Interpretation:     Interpretation: abnormal    Quality:     Tracing quality:  Limited by artifact  Rate:     ECG rate:  83    ECG rate assessment: normal    Rhythm:     Rhythm: sinus rhythm    Ectopy:     Ectopy: none    QRS:     QRS axis:  Normal    QRS intervals:  Normal  Conduction:     Conduction: normal    ST segments:     ST segments:  Normal  T waves:     T waves: normal             Phone Contacts  ED Phone Contact    ED Course         HEART Risk Score      Most Recent Value   History  0 Filed at: 08/01/2018 2308   ECG  1 Filed at: 08/01/2018 2308   Age  1 Filed at: 08/01/2018 2308   Risk Factors  1 Filed at: 08/01/2018 2308   Troponin  0 Filed at: 08/01/2018 2308   Heart Score Risk Calculator   History  0 Filed at: 08/01/2018 2308   ECG  1 Filed at: 08/01/2018 2308   Age  1 Filed at: 08/01/2018 2308   Risk Factors  1 Filed at: 08/01/2018 2308   Troponin  0 Filed at: 08/01/2018 2308   HEART Score  3 Filed at: 08/01/2018 2308   HEART Score  3 Filed at: 08/01/2018 2308                            MDM  Number of Diagnoses or Management Options  Acute CHF (congestive heart failure) ([facility] [address] ): new and requires workup  Diagnosis management comments: DDX including but not limited to: pneumonia, pleural effusion, CHF, PE, PTX, ACS, MI, asthma exacerbation, COPD exacerbation, anemia, metabolic abnormality, renal failure  Plan:  Cardiac workup, chest x-ray  Disposition pending  Amount and/or Complexity of Data Reviewed  Clinical lab tests: ordered and reviewed  Tests in the radiology section of CPT®: reviewed and ordered  Independent visualization of images, tracings, or specimens: yes    Risk of Complications, Morbidity, and/or Mortality  Presenting problems: moderate  Management options: low  General comments: This is a 73-year-old female with leg swelling and intermittent shortness of breath  Workup concerning for mild acute exacerbation of heart failure  She is not on any diuresis at home  I am concerned for her management as an outpatient she has no outpatient follow-up  She has not established with a cardiologist in this area  She also has EKG changes for which there is no old or prior EKG to compare to  She has a heart score of 3-4  Patient agrees with staying hospital for management  Spoke with Internal Medicine, Herline  Who agrees with admission  She is stable at the time of admission  She was given Lasix for diuresis  Patient Progress  Patient progress: stable    CritCare Time    Disposition  Final diagnoses:   Acute CHF (congestive heart failure) ([address] )     Time reflects when diagnosis was documented in both MDM as applicable and the Disposition within this note     Time User Action Codes Description Comment    8/1/2018 11:08 PM Teresia Dakins L Add [I50 9] Acute CHF (congestive heart failure) [facility])       ED Disposition     ED Disposition Condition Comment    Admit  Case was discussed with Herline and the patient's admission status was agreed to be Admission Status: inpatient status to the service of Dr Hiro Cordero          Follow-up Information    None         Patient's Medications   Discharge Prescriptions    No medications on file     No discharge procedures on file      ED Provider  Electronically Signed by           Lyn Cho PA-C  08/01/18 0932

## 2018-08-02 NOTE — H&P
H&P- Johny Patel 1965, 50 y o  female MRN: [id number]    Unit/Bed#: ED 09 Encounter: [id number]    Primary Care Provider: Man Snow MD   Date and time admitted to hospital: 8/1/2018  8:36 PM        CHF (congestive heart failure) [facility])   Assessment & Plan    Acute on chronic present on admission  BNP greater than 1300  EKG reviewed note some ST abnormality  Chest x-ray reviewed, mild vascular congestion noted  Lasix IV  Monitor intake and output, low-sodium diet, daily weight  Cardiology consult further management  Telemetry  Encourage Congestive Heart Failure, low-sodium diet  Consult Nutrition         Abnormal EKG   Assessment & Plan    Denies chest pain  Consult Cardiology for further input  Trend troponin  Telemetry  Hypertension   Assessment & Plan    Continue Norvasc        * Foot swelling   Assessment & Plan    In the setting of acute and chronic Congestive Heart Failure  See workup above  Denies Hawa signs, Encouraged elevated on pillows  VTE Prophylaxis: Enoxaparin (Lovenox)  / sequential compression device   Code Status:  Full code  POLST: POLST form is not discussed and not completed at this time  Discussion with family:  No family at bedside    Anticipated Length of Stay:  Patient will be admitted on an Inpatient basis with an anticipated length of stay of  greater than 2 midnights  Justification for Hospital Stay:  Congestive heart failure, IV Lasix    Total Time for Visit, including Counseling / Coordination of Care: 1 hour  Greater than 50% of this total time spent on direct patient counseling and coordination of care  Chief Complaint:   Foot pain and swelling    History of Present Illness:    Johny Patel is a 50 y o  female history of Congestive Heart Failure, hypertension, anxiety who presents with chief complain of bilateral feet and ankle swelling onset a few days as well as dyspnea with exertion onset 3 days    Patient denies chest pain, dizziness, lightheadedness, fever or chills  Review of Systems:    Review of Systems   Respiratory: Positive for shortness of breath  Negative for cough, chest tightness and wheezing  Cardiovascular: Positive for chest pain and leg swelling  All other systems reviewed and are negative  Past Medical and Surgical History:     Past Medical History:   Diagnosis Date    Anxiety     Cancer ([facility] )     CHF (congestive heart failure) ([facility] )     Hodgkin's disease ([place] [address] )     Hypertension        Past Surgical History:   Procedure Laterality Date    CARDIAC SURGERY      COLON SURGERY      HYSTERECTOMY      NECK SURGERY      TUMOR REMOVAL         Meds/Allergies:    Prior to Admission medications    Medication Sig Start Date End Date Taking? Authorizing Provider   amLODIPine (NORVASC) 10 mg tablet Take 10 mg by mouth 6/12/18  Yes Historical Provider, MD   aspirin (ECOTRIN LOW STRENGTH) 81 mg EC tablet Take 81 mg by mouth daily   Yes Historical Provider, MD   atorvastatin (LIPITOR) 80 mg tablet Take 80 mg by mouth 6/12/18  Yes Historical Provider, MD   citalopram (CeleXA) 40 mg tablet Take 40 mg by mouth 6/12/18  Yes Historical Provider, MD   pantoprazole (PROTONIX) 40 mg tablet Take 40 mg by mouth 6/12/18  Yes Historical Provider, MD     I have reviewed home medications with patient personally      Allergies: No Known Allergies    Social History:     Marital Status: /Civil Union   Occupation:  Full time  Patient Pre-hospital Living Situation:  Home  Patient Pre-hospital Level of Mobility:  Independent  Patient Pre-hospital Diet Restrictions:  None  Substance Use History:   History   Alcohol Use No     History   Smoking Status    Never Smoker   Smokeless Tobacco    Never Used     History   Drug Use No       Family History:    non-contributory    Physical Exam:     Vitals:   Blood Pressure: 153/74 (08/02/18 0200)  Pulse: (!) 111 (08/02/18 0200)  Temperature: 98 3 °F (36 8 °C) (08/01/18 2139)  Temp Source: Oral (08/01/18 2139)  Respirations: 18 (08/02/18 0200)  Height: 5' 4" (162 6 cm) (08/01/18 2042)  Weight - Scale: 79 8 kg (176 lb) (08/01/18 2042)  SpO2: 90 % (08/02/18 0200)    Physical Exam   Constitutional: She is oriented to person, place, and time  She appears well-developed and well-nourished  No distress  HENT:   Head: Normocephalic and atraumatic  Mouth/Throat: Oropharynx is clear and moist    Neck: Normal range of motion  Neck supple  Cardiovascular: Normal rate, regular rhythm and intact distal pulses  Murmur heard  Pulmonary/Chest: Effort normal  No accessory muscle usage  No respiratory distress  She has no decreased breath sounds  She has rales  She exhibits no tenderness  Abdominal: Soft  Bowel sounds are normal  She exhibits no distension  There is no tenderness  Musculoskeletal: Normal range of motion  She exhibits edema (trace)  She exhibits no tenderness  Neurological: She is alert and oriented to person, place, and time  Skin: Skin is warm and dry  She is not diaphoretic  Psychiatric: She has a normal mood and affect  Her behavior is normal    Nursing note and vitals reviewed  Additional Data:     Lab Results: I have personally reviewed pertinent reports  Results from last 7 days  Lab Units 08/01/18  2157   WBC Thousand/uL 11 19*   HEMOGLOBIN g/dL 12 6   HEMATOCRIT % 40 4   PLATELETS Thousands/uL 435*   NEUTROS PCT % 43   LYMPHS PCT % 42   MONOS PCT % 10   EOS PCT % 4       Results from last 7 days  Lab Units 08/01/18 2157   SODIUM mmol/L 139   POTASSIUM mmol/L 3 5   CHLORIDE mmol/L 102   CO2 mmol/L 30   BUN mg/dL 13   CREATININE mg/dL 0 74   CALCIUM mg/dL 8 8   TOTAL PROTEIN g/dL 8 3*   BILIRUBIN TOTAL mg/dL 0 30   ALK PHOS U/L 115   ALT U/L 28   AST U/L 26   GLUCOSE RANDOM mg/dL 91       Results from last 7 days  Lab Units 08/01/18  2157   INR  1 07               Imaging: I have personally reviewed pertinent reports        XR chest 2 views   Final Result by KEIRA Og Costa MD (08/01 2151)      Mild vascular congestion  Workstation performed: [id number]             EKG, Pathology, and Other Studies Reviewed on Admission:   · EKG: Normal sinus rhythm, Right atrial enlargement,T wave abnormality, consider inferolateral ischemia  Prolonged QT,Abnormal ECG    Allscripts / Epic Records Reviewed: Yes     ** Please Note: This note has been constructed using a voice recognition system   **

## 2018-08-02 NOTE — SOCIAL WORK
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  Pt declined information  Pt drives  CM reviewed discharge planning process including the following: identifying help at home, patient preference for discharge planning needs, pharmacy preference, and availability of treatment team to discuss questions or concerns patient and/or family may have regarding understanding medications and recognizing signs and symptoms once discharged  CM also encouraged patient to follow up with all recommended appointments after discharge  Patient advised of importance for patient and family to participate in managing patients medical well being  CM name and role reviewed and Discharge Checklist provided  Pt has no needs at this time  CM department to follow pt through dc

## 2018-08-02 NOTE — PLAN OF CARE
Problem: CARDIOVASCULAR - ADULT  Goal: Maintains optimal cardiac output and hemodynamic stability  INTERVENTIONS:  - Monitor I/O, vital signs and rhythm  - Monitor for S/S and trends of decreased cardiac output i e  bleeding, hypotension  - Administer and titrate ordered vasoactive medications to optimize hemodynamic stability  - Assess quality of pulses, skin color and temperature  - Assess for signs of decreased coronary artery perfusion - ex   Angina  - Instruct patient to report change in severity of symptoms  Outcome: Progressing    Goal: Absence of cardiac dysrhythmias or at baseline rhythm  INTERVENTIONS:  - Continuous cardiac monitoring, monitor vital signs, obtain 12 lead EKG if indicated  - Administer antiarrhythmic and heart rate control medications as ordered  - Monitor electrolytes and administer replacement therapy as ordered  Outcome: Progressing

## 2018-08-02 NOTE — PLAN OF CARE
Problem: DISCHARGE PLANNING - CARE MANAGEMENT  Goal: Discharge to post-acute care or home with appropriate resources  INTERVENTIONS:  - Conduct assessment to determine patient/family and health care team treatment goals, and need for post-acute services based on payer coverage, community resources, and patient preferences, and barriers to discharge  - Address psychosocial, clinical, and financial barriers to discharge as identified in assessment in conjunction with the patient/family and health care team  - Arrange appropriate level of post-acute services according to patients   needs and preference and payer coverage in collaboration with the physician and health care team  - Communicate with and update the patient/family, physician, and health care team regarding progress on the discharge plan  - Arrange appropriate transportation to post-acute venues  Outcome: Progressing  LOS: 1 CM met with pt, daughters, and nurse at bedside  Pt lives in [facility] with her children  Pt lives on 2nd floor of home and has one flight to enter  Pt denies DME and completes ADL's independently  Pt denies hx of HHC and rehab  Pt uses [facility]  Pt has hx of depression and is prescribed medications by her PCP  Pt is from [place] and recently setup PCP in [place]  Pt denies hx of SA  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 and she does not have AD  Pt declined information  Pt drives  CM reviewed discharge planning process including the following: identifying help at home, patient preference for discharge planning needs, pharmacy preference, and availability of treatment team to discuss questions or concerns patient and/or family may have regarding understanding medications and recognizing signs and symptoms once discharged  CM also encouraged patient to follow up with all recommended appointments after discharge  Patient advised of importance for patient and family to participate in managing patients medical well being  CM name and role reviewed and Discharge Checklist provided  Pt has no needs at this time  CM department to follow pt through dc

## 2018-08-03 VITALS
SYSTOLIC BLOOD PRESSURE: 109 MMHG | WEIGHT: 175.49 LBS | OXYGEN SATURATION: 98 % | RESPIRATION RATE: 17 BRPM | BODY MASS INDEX: 29.96 KG/M2 | HEART RATE: 84 BPM | HEIGHT: 64 IN | TEMPERATURE: 98 F | DIASTOLIC BLOOD PRESSURE: 70 MMHG

## 2018-08-03 PROBLEM — I35.0 MODERATE TO SEVERE AORTIC STENOSIS: Status: ACTIVE | Noted: 2018-08-03

## 2018-08-03 PROBLEM — I50.33 ACUTE ON CHRONIC DIASTOLIC HEART FAILURE (HCC): Status: RESOLVED | Noted: 2018-08-03 | Resolved: 2018-08-03

## 2018-08-03 PROBLEM — I50.33 ACUTE ON CHRONIC DIASTOLIC HEART FAILURE (HCC): Status: ACTIVE | Noted: 2018-08-03

## 2018-08-03 LAB
ANION GAP SERPL CALCULATED.3IONS-SCNC: 6 MMOL/L (ref 4–13)
BUN SERPL-MCNC: 13 MG/DL (ref 5–25)
CALCIUM SERPL-MCNC: 8.9 MG/DL (ref 8.3–10.1)
CHLORIDE SERPL-SCNC: 100 MMOL/L (ref 100–108)
CO2 SERPL-SCNC: 32 MMOL/L (ref 21–32)
CREAT SERPL-MCNC: 0.92 MG/DL (ref 0.6–1.3)
ERYTHROCYTE [DISTWIDTH] IN BLOOD BY AUTOMATED COUNT: 14.3 % (ref 11.6–15.1)
GFR SERPL CREATININE-BSD FRML MDRD: 73 ML/MIN/1.73SQ M
GLUCOSE SERPL-MCNC: 118 MG/DL (ref 65–140)
HCT VFR BLD AUTO: 44 % (ref 34.8–46.1)
HGB BLD-MCNC: 13.6 G/DL (ref 11.5–15.4)
MCH RBC QN AUTO: 27.3 PG (ref 26.8–34.3)
MCHC RBC AUTO-ENTMCNC: 30.9 G/DL (ref 31.4–37.4)
MCV RBC AUTO: 88 FL (ref 82–98)
PLATELET # BLD AUTO: 491 THOUSANDS/UL (ref 149–390)
PMV BLD AUTO: 10.2 FL (ref 8.9–12.7)
POTASSIUM SERPL-SCNC: 3.9 MMOL/L (ref 3.5–5.3)
RBC # BLD AUTO: 4.99 MILLION/UL (ref 3.81–5.12)
SODIUM SERPL-SCNC: 138 MMOL/L (ref 136–145)
WBC # BLD AUTO: 10.4 THOUSAND/UL (ref 4.31–10.16)

## 2018-08-03 PROCEDURE — 99232 SBSQ HOSP IP/OBS MODERATE 35: CPT | Performed by: INTERNAL MEDICINE

## 2018-08-03 PROCEDURE — 80048 BASIC METABOLIC PNL TOTAL CA: CPT | Performed by: INTERNAL MEDICINE

## 2018-08-03 PROCEDURE — 99239 HOSP IP/OBS DSCHRG MGMT >30: CPT | Performed by: INTERNAL MEDICINE

## 2018-08-03 PROCEDURE — 85027 COMPLETE CBC AUTOMATED: CPT | Performed by: INTERNAL MEDICINE

## 2018-08-03 RX ORDER — FUROSEMIDE 40 MG/1
40 TABLET ORAL DAILY
Qty: 30 TABLET | Refills: 1 | Status: ON HOLD | OUTPATIENT
Start: 2018-08-03 | End: 2018-10-16

## 2018-08-03 RX ADMIN — PANTOPRAZOLE SODIUM 40 MG: 40 TABLET, DELAYED RELEASE ORAL at 05:20

## 2018-08-03 RX ADMIN — ATORVASTATIN CALCIUM 80 MG: 40 TABLET, FILM COATED ORAL at 18:16

## 2018-08-03 RX ADMIN — FUROSEMIDE 20 MG: 10 INJECTION, SOLUTION INTRAMUSCULAR; INTRAVENOUS at 10:04

## 2018-08-03 RX ADMIN — ENOXAPARIN SODIUM 40 MG: 40 INJECTION SUBCUTANEOUS at 10:05

## 2018-08-03 NOTE — PLAN OF CARE
Problem: CARDIOVASCULAR - ADULT  Goal: Maintains optimal cardiac output and hemodynamic stability  INTERVENTIONS:  - Monitor I/O, vital signs and rhythm  - Monitor for S/S and trends of decreased cardiac output i e  bleeding, hypotension  - Administer and titrate ordered vasoactive medications to optimize hemodynamic stability  - Assess quality of pulses, skin color and temperature  - Assess for signs of decreased coronary artery perfusion - ex  Angina  - Instruct patient to report change in severity of symptoms   Outcome: Progressing    Goal: Absence of cardiac dysrhythmias or at baseline rhythm  INTERVENTIONS:  - Continuous cardiac monitoring, monitor vital signs, obtain 12 lead EKG if indicated  - Administer antiarrhythmic and heart rate control medications as ordered  - Monitor electrolytes and administer replacement therapy as ordered   Outcome: Progressing      Problem: Potential for Falls  Goal: Patient will remain free of falls  INTERVENTIONS:  - Assess patient frequently for physical needs  -  Identify cognitive and physical deficits and behaviors that affect risk of falls    -  [place] fall precautions as indicated by assessment   - Educate patient/family on patient safety including physical limitations  - Instruct patient to call for assistance with activity based on assessment  - Modify environment to reduce risk of injury  - Consider OT/PT consult to assist with strengthening/mobility   Outcome: Progressing

## 2018-08-03 NOTE — DISCHARGE SUMMARY
Discharge Summary - [facility] Internal Medicine    Patient Information: Chloe Zamudio 50 y o  female MRN: [id number]  Unit/Bed#: -01 Encounter: [id number]    Discharging Physician / Practitioner: Dagoberto Malcolm DO  PCP: Naldo Saul MD  Admission Date: 8/1/2018  Discharge Date: 08/03/18    Reason for Admission:  Lower extremity edema, dyspnea    Discharge Diagnoses:     Principal Problem (Resolved):    [facility] on chronic [facility] )  Active Problems:    CHF (congestive heart failure) ([facility] )    Hypertension    Moderate to severe aortic stenosis    HPI: Chloe Zamudio is a 50 y o  female history of Congestive Heart Failure, hypertension, anxiety who presents with chief complain of bilateral feet and ankle swelling onset a few days as well as dyspnea with exertion onset 3 days    Patient denies chest pain, dizziness, lightheadedness, fever or chills  Consultations During Hospital Stay:  · Cardiology    Procedures Performed:     · Echocardiogram    Significant Findings:     · Refer to hospital course    Incidental Findings:   ·     Test Results Pending at Discharge (will require follow up):   ·      Outpatient Tests Requested:  ·     Complications:      Hospital Course:     # Acute on chronic diastolic HF secondary to noncompliance with low-salt diet  - prior history of lymphoma; heart failure considered secondary to prior radiation treatment  - Known hx of HF and AS; obtained last echo from her cardiologist office at [facility] (10/2017) reported EF of 63%, diastolic dysfunction  - she was seen by Cardiology here diuresis was optimized with IV Lasix  - okay per Cardiology to discharge on Lasix 40 mg daily  - educated patient and her daughter at length on dietary and lifestyle modification, including daily weight  - status post echo here revealing EF of 50-55%  - patient recently moved here from [place], she wishes to continue to follow up with [facility]  - serial troponin negative  - clinically improved, resolved lower extremity edema and dyspnea    # moderate to severe aortic stenosis - known history, she actually follows with Dr Domo Benavidez at [facility]  Last echocardiogram revealing aortic valve area of 0 9  Repeat echocardiogram here revealing aortic valve area of 0 6  She will continue to follow up with Cardiology  # HTN - stable    Disp: D/c to to home  Plan of care discussed with patient and her daughter at length  Condition at Discharge: stable     Discharge Day Visit / Exam:     Subjective:  Patient feels well  Lower extremity edema improved  Ambulating without dyspnea  Vitals: Blood Pressure: 109/70 (08/03/18 1500)  Pulse: 84 (08/03/18 1500)  Temperature: 98 °F (36 7 °C) (08/03/18 1500)  Temp Source: Oral (08/03/18 1500)  Respirations: 17 (08/03/18 1500)  Height: 5' 4" (162 6 cm) (08/02/18 1448)  Weight - Scale: 79 6 kg (175 lb 7 8 oz) (08/03/18 0600)  SpO2: 98 % (08/03/18 1500)  Exam:   Physical Exam   Constitutional: She is oriented to person, place, and time  She appears well-developed and well-nourished  Neck: Neck supple  Cardiovascular: Normal rate, regular rhythm and intact distal pulses  Exam reveals no gallop and no friction rub  Murmur heard  Pulmonary/Chest: Effort normal and breath sounds normal  No respiratory distress  She has no wheezes  She has no rales  She exhibits no tenderness  Abdominal: Soft  Bowel sounds are normal  She exhibits no distension and no mass  There is no tenderness  There is no rebound and no guarding  Musculoskeletal: Normal range of motion  She exhibits no edema, tenderness or deformity  Neurological: She is alert and oriented to person, place, and time  She has normal reflexes  She displays normal reflexes  No cranial nerve deficit  She exhibits normal muscle tone   Coordination normal        Discharge instructions/Information to patient and family:   See after visit summary for information provided to patient and family  Provisions for Follow-Up Care:  See after visit summary for information related to follow-up care and any pertinent home health orders  Disposition:     Home    For Discharges to [facility] SNF:   · Not Applicable to this Patient - Not Applicable to this Patient    Planned Readmission: no     Discharge Statement:  I spent more than 30 minutes discharging the patient  This time was spent on the day of discharge  I had direct contact with the patient on the day of discharge  Greater than 50% of the total time was spent examining patient, answering all patient questions, arranging and discussing plan of care with patient as well as directly providing post-discharge instructions  Additional time then spent on discharge activities  Discharge Medications:  See after visit summary for reconciled discharge medications provided to patient and family  ** Please Note: Dragon 360 Dictation voice to text software may have been used in the creation of this document   **

## 2018-08-03 NOTE — PROGRESS NOTES
General Cardiology   Progress Note   Johny Patel 50 y o  female MRN: [id number]  Unit/Bed#: -01 Encounter: [id number]      SUBJECTIVE:   No significant events overnight  Im feeling slightly better, still with some shortness of breath    REVIEW OF SYSTEMS:  Constitutional:  Denies fever or chills   Eyes:  Denies change in visual acuity   HENT:  Denies nasal congestion or sore throat   Respiratory: +shortness of breath Denies cough  Cardiovascular:  Denies chest pain or edema   GI:  Denies abdominal pain, nausea, vomiting, bloody stools or diarrhea   :  Denies dysuria, frequency, difficulty in micturition and nocturia  Musculoskeletal:  Denies back pain or joint pain   Neurologic:  Denies headache, focal weakness or sensory changes   Endocrine:  Denies polyuria or polydipsia   Lymphatic:  Denies swollen glands   Psychiatric:  Denies depression or anxiety     OBJECTIVE:   Vitals:  Vitals:    08/03/18 0700   BP: 129/62   Pulse: 83   Resp: 16   Temp: 98 4 °F (36 9 °C)   SpO2: 95%     Body mass index is 30 12 kg/m²  Systolic (48CFY), JAB:739 , Min:101 , UPF:266     Diastolic (44RRE), UAT:23, Min:55, Max:64      Intake/Output Summary (Last 24 hours) at 08/03/18 1242  Last data filed at 08/03/18 0305   Gross per 24 hour   Intake                0 ml   Output              350 ml   Net             -350 ml     Weight (last 2 days)     Date/Time   Weight    08/03/18 0600  79 6 (175 49)    08/02/18 1447  79 8 (176)    08/01/18 2042  79 8 (176)                  PHYSICAL EXAMS:  General:  Patient is not in acute distress, laying in the bed comfortably, awake, alert responding to commands  Head: Normocephalic, Atraumatic  HEENT:  Both pupils normal-size atraumatic, normocephalic, nonicteric  Neck:  JVP not raised  Trachea central  Respiratory:  Decreased breath sounds  Cardiovascular:  S1 S2 normal RRR +murmur  GI:  Abdomen soft nontender   Liver and spleen normal size  Lymphatic:  No cervical or inguinal lymphadenopathy  Neurologic:  Patient is awake alert, responding to command, well-oriented to time and place and person moving     LABORATORY RESULTS:    Results from last 7 days  Lab Units 08/02/18  0450 08/02/18  0137 08/01/18  2157   TROPONIN I ng/mL 0 02 0 02 0 02       CBC with diff:   Results from last 7 days  Lab Units 08/03/18  0520 08/02/18  0450 08/01/18  2157   WBC Thousand/uL 10 40* 12 10* 11 19*   HEMOGLOBIN g/dL 13 6 13 7 12 6   HEMATOCRIT % 44 0 44 0 40 4   MCV fL 88 88 88   PLATELETS Thousands/uL 491* 439* 435*   MCH pg 27 3 27 4 27 3   MCHC g/dL 30 9* 31 1* 31 2*   RDW % 14 3 14 2 14 3   MPV fL 10 2 10 1 9 9   NRBC AUTO /100 WBCs  --  0 0       CMP:  Results from last 7 days  Lab Units 08/03/18  0520 08/02/18  0450 08/01/18  2157   SODIUM mmol/L 138 136 139   POTASSIUM mmol/L 3 9 3 5 3 5   CHLORIDE mmol/L 100 100 102   CO2 mmol/L 32 27 30   ANION GAP mmol/L 6 9 7   BUN mg/dL 13 13 13   CREATININE mg/dL 0 92 0 78 0 74   GLUCOSE RANDOM mg/dL 118 134 91   CALCIUM mg/dL 8 9 9 1 8 8   AST U/L  --   --  26   ALT U/L  --   --  28   ALK PHOS U/L  --   --  115   TOTAL PROTEIN g/dL  --   --  8 3*   BILIRUBIN TOTAL mg/dL  --   --  0 30   EGFR ml/min/1 73sq m 73 89 95       BMP:  Results from last 7 days  Lab Units 08/03/18  0520 08/02/18  0450 08/01/18  2157   SODIUM mmol/L 138 136 139   POTASSIUM mmol/L 3 9 3 5 3 5   CHLORIDE mmol/L 100 100 102   CO2 mmol/L 32 27 30   BUN mg/dL 13 13 13   CREATININE mg/dL 0 92 0 78 0 74   GLUCOSE RANDOM mg/dL 118 134 91   CALCIUM mg/dL 8 9 9 1 8 8         Results from last 7 days  Lab Units 08/01/18  2157   NT-PRO BNP pg/mL 1,303*                    Results from last 7 days  Lab Units 08/01/18  2157   INR  1 07       Lipid Profile:   Lab Results   Component Value Date    CHOL 155 08/02/2018     Lab Results   Component Value Date    HDL 39 (L) 08/02/2018     Lab Results   Component Value Date    LDLCALC 88 08/02/2018     Lab Results   Component Value Date    TRIG 142 2018       Cardiac testing:  Results for orders placed during the hospital encounter of 18   Echo complete with contrast if indicated    Narrative [address] [place] [phone]    Transthoracic Echocardiogram  2D, M-mode, Doppler, and Color Doppler    Study date:  02-Aug-2018    Patient: Samantha Smallwood  MR number: [id number]  Account number: [de-identified]  : 1965  Age: 50 years  Gender: Female  Status: Inpatient  Location: Emergency room  Height: 64 in  Weight: 176 lb  BP: 114/ 59 mmHg    Indications: Dyspnea, wheezing, tachypnea, Shortness of breath  Diagnoses: R06 00 - Dyspnea, unspecified, R06 02 - Shortness of breath    Sonographer:   [facility]  Interpreting Physician:  Dieter Woodruff MD  Referring Physician:  PAULETTE Charles  Group:  [facility]    SUMMARY    PROCEDURE INFORMATION:  This was a technically difficult study  Echocardiographic views were limited due to poor acoustic window availability, decreased penetration, and lung interference  LEFT VENTRICLE:  Systolic function was at the lower limits of normal  Ejection fraction was estimated in the range of 50 % to 55 %  There were no regional wall motion abnormalities  Left ventricular diastolic function parameters were normal     RIGHT VENTRICLE:  The size was normal   Systolic function was normal     MITRAL VALVE:  There was moderate annular calcification  There was mild stenosis  There was trace regurgitation  AORTIC VALVE:  The valve was trileaflet  Leaflets exhibited increased thickness and calcification with reduced cuspal separation  Transaortic velocity was increased due to valvular stenosis  There was moderate to severe stenosis  Peak and mean AV gradients were 50 and 31 mm Hg respectively  GIL by the continuity equation method was 0 6 sq cm  There was mild regurgitation      TRICUSPID VALVE:  There was trace regurgitation  Pulmonary artery systolic pressure was at the upper limits of normal     PULMONIC VALVE:  There was mild to moderate regurgitation  HISTORY: PRIOR HISTORY: Abnormal EKG, Cancer, Congestive heart failure  Risk factors: hypertension  PROCEDURE: The procedure was performed in the emergency room  This was a routine study  The transthoracic approach was used  The study included complete 2D imaging, M-mode, complete spectral Doppler, and color Doppler  The heart rate was  82 bpm, at the start of the study  Images were obtained from the parasternal, apical, subcostal, and suprasternal notch acoustic windows  Echocardiographic views were limited due to poor acoustic window availability, decreased penetration,  and lung interference  This was a technically difficult study  LEFT VENTRICLE: Size was normal  Systolic function was at the lower limits of normal  Ejection fraction was estimated in the range of 50 % to 55 %  There were no regional wall motion abnormalities  Wall thickness was normal  No evidence of  apical thrombus  DOPPLER: Left ventricular diastolic function parameters were normal     RIGHT VENTRICLE: The size was normal  Systolic function was normal  Wall thickness was normal     LEFT ATRIUM: Size was normal     RIGHT ATRIUM: Size was normal     MITRAL VALVE: There was moderate annular calcification  There was normal leaflet separation  DOPPLER: There was mild stenosis  There was trace regurgitation  AORTIC VALVE: The valve was trileaflet  Leaflets exhibited increased thickness and calcification with reduced cuspal separation  DOPPLER: Transaortic velocity was increased due to valvular stenosis  There was moderate to severe stenosis  Peak and mean AV gradients were 50 and 31 mm Hg respectively  GIL by the continuity equation method was 0 6 sq cm  There was mild regurgitation  TRICUSPID VALVE: The valve structure was normal  There was normal leaflet separation   DOPPLER: The transtricuspid velocity was within the normal range  There was no evidence for stenosis  There was trace regurgitation  Pulmonary artery  systolic pressure was at the upper limits of normal     PULMONIC VALVE: Leaflets exhibited normal thickness, no calcification, and normal cuspal separation  DOPPLER: The transpulmonic velocity was within the normal range  There was mild to moderate regurgitation  PERICARDIUM: There was no pericardial effusion  The pericardium was normal in appearance  AORTA: The root exhibited normal size  SYSTEMIC VEINS: IVC: The inferior vena cava was normal in size  Respirophasic changes were normal     SYSTEM MEASUREMENT TABLES    2D  %FS: 26 5 %  Ao Diam: 2 7 cm  EDV(Teich): 116 2 ml  EF(Teich): 51 7 %  ESV(Teich): 56 1 ml  IVSd: 0 7 cm  LA Area: 18 3 cm2  LA Diam: 3 2 cm  LVEDV MOD A4C: 101 8 ml  LVEF MOD A4C: 44 1 %  LVESV MOD A4C: 56 9 ml  LVIDd: 5 cm  LVIDs: 3 6 cm  LVLd A4C: 7 9 cm  LVLs A4C: 6 6 cm  LVOT Diam: 1 9 cm  LVPWd: 0 8 cm  RA Area: 19 1 cm2  RVIDd: 3 3 cm  SV MOD A4C: 44 9 ml  SV(Teich): 60 ml    CW  AR Dec White: 3 8 m/s2  AR Dec Time: 1100 5 ms  AR PHT: 319 1 ms  AR Vmax: 4 2 m/s  AR maxP 9 mmHg  AV Env  Ti: 304 5 ms  AV VTI: 75 1 cm  AV Vmax: 3 3 m/s  AV Vmax: 3 5 m/s  AV Vmean: 2 5 m/s  AV maxP 3 mmHg  AV maxP 6 mmHg  AV meanP 2 mmHg  HR: 84 3 BPM  MV VTI: 36 6 cm  MV Vmax: 1 4 m/s  MV Vmean: 0 9 m/s  MV maxP 1 mmHg  MV meanPG: 3 6 mmHg  TR Vmax: 3 m/s  TR maxP 3 mmHg    MM  TAPSE: 1 8 cm    PW  GIL (VTI): 0 8 cm2  GIL Vmax: 0 7 cm2  GIL Vmax: 0 7 cm2  E': 0 1 m/s  E/E': 21  LVOT Env  Ti: 323 5 ms  LVOT VTI: 21 7 cm  LVOT Vmax: 0 9 m/s  LVOT Vmean: 0 7 m/s  LVOT maxPG: 3 3 mmHg  LVOT meanP mmHg  LVSV Dopp: 59 1 ml  MV A Papi: 1 m/s  MV Dec White: 5 4 m/s2  MV DecT: 210 1 ms  MV E Papi: 1 1 m/s  MV E/A Ratio: 1 1  MV PHT: 60 9 ms  MVA (VTI): 1 6 cm2  MVA By PHT: 3 6 cm2    [employer] Accredited Echocardiography Laboratory    Prepared and electronically signed by    Og Rojas MD  Signed 02-Aug-2018 19:09:33       No results found for this or any previous visit  No results found for this or any previous visit  No procedure found  No results found for this or any previous visit  Meds/Allergies   all current active meds have been reviewed  Prescriptions Prior to Admission   Medication    amLODIPine (NORVASC) 10 mg tablet    aspirin (ECOTRIN LOW STRENGTH) 81 mg EC tablet    atorvastatin (LIPITOR) 80 mg tablet    citalopram (CeleXA) 40 mg tablet    pantoprazole (PROTONIX) 40 mg tablet          ASSESSMENT & PLAN   1- Acute diastolic congestive heart failure; ef 50-55%, continue with lasix IV  Continue asa, norvasc, lipitor  Daily weights  Salt restriction  Strict I&Os  2-moderate to severe aortic stenosis, gradients are 50 and 31     3-hypertension, continue all medications    BHAVIK Lerma  8/3/2018,12:42 PM    Portions of the record may have been created with voice recognition software   Occasional wrong word or "sound a like" substitutions may have occurred due to the inherent limitations of voice recognition software   Read the chart carefully and recognize, using context, where substitutions have occurred

## 2018-08-03 NOTE — NURSING NOTE
Iv removed  Reviewed avs w/patient and script given  No complaint or questions  Belongings and script sent w patient  Safe for discharge

## 2018-08-03 NOTE — DISCHARGE INSTRUCTIONS
Weigh yourself daily  Notify your primary care physician or cardiologist if you have weight gain of 2-3 lb over 3 days  Aortic Stenosis   WHAT YOU NEED TO KNOW:   Aortic stenosis is a condition where the aortic valve in your heart is narrowed  The aortic valve is between the left ventricle and the aorta  The left ventricle is the lower left chamber of your heart  The aorta is a blood vessel that pumps blood to your head and body  The aortic valve opens and closes to direct blood flow through your heart  When the aortic valve is narrowed, blood flow may decrease  Your tissues and organs will not have enough oxygen and nutrients to function properly  DISCHARGE INSTRUCTIONS:   Medicines:   · Cholesterol medicine: This medicine will help decrease the amount of cholesterol in your blood  · Antibiotics: This medicine will help fight or prevent an infection  You may need it if you had rheumatic fever in the past  You may need to take the medicine every day, or once a month  · Take your medicine as directed  Contact your healthcare provider if you think your medicine is not helping or if you have side effects  Tell him or her if you are allergic to any medicine  Keep a list of the medicines, vitamins, and herbs you take  Include the amounts, and when and why you take them  Bring the list or the pill bottles to follow-up visits  Carry your medicine list with you in case of an emergency  Follow up with your healthcare provider or cardiologist as directed: You may need to return for more tests to check your heart  Write down your questions so you remember to ask them during your visits  Self-care:   · Eat a variety of healthy foods:  Healthy foods include fruits, vegetables, whole-grain breads, low-fat dairy products, beans, lean meats, and fish  Ask if you need to be on a special diet  · Exercise: This will improve your heart health   Ask your healthcare provider about the best exercise plan for you  · Maintain a healthy weight:  Ask your healthcare provider how much you should weigh  Ask him to help you create a weight loss plan if you are overweight  Contact your healthcare provider or cardiologist if:   · You are bleeding from your nose or gums  · The veins in your neck look swollen or are bulging  · You have a fever  · You have blood in your urine or bowel movements  · You have questions or concerns about your condition or care  Seek care immediately or call 911 if:   · Your arm or leg feels warm, tender, and painful  It may look swollen and red  · Your heart is beating faster than normal for you, and you feel fluttering in your chest     · You suddenly feel lightheaded and short of breath  · You have chest pain that feels like squeezing, pressure, fullness, or pain  · You have chest pain that lasts for more than a few minutes or returns  · You are nauseated and have trouble breathing  · You have a severe headache, cold sweats, and feel lightheaded or dizzy  · You have weakness or numbness on one side of your arm, leg, or face  · You are confused and cannot speak clearly  © 2017 [address]  Information is for End User's use only and may not be sold, redistributed or otherwise used for commercial purposes  All illustrations and images included in CareNotes® are the copyrighted property of [employer]  or Remington Greene  The above information is an  only  It is not intended as medical advice for individual conditions or treatments  Talk to your doctor, nurse or pharmacist before following any medical regimen to see if it is safe and effective for you  Heart Failure   WHAT YOU NEED TO KNOW:   Heart failure (HF) is a condition that does not allow your heart to fill or pump properly  Not enough oxygen in your blood gets to your organs and tissues   HF can occur in the right side, the left side, or both lower chambers of your heart  HF is often caused by damage or injury to your heart  The damage may be caused by heart attack, other heart conditions, or high blood pressure  HF is a long-term condition that tends to get worse over time  It is important to manage your health to improve your quality of life  HF can be worsened by heavy alcohol use, smoking, diabetes that is not controlled, or obesity  DISCHARGE INSTRUCTIONS:   Call 911 if:   · You have any of the following signs of a heart attack:      ¨ Squeezing, pressure, or pain in your chest that lasts longer than 5 minutes or returns    ¨ Discomfort or pain in your back, neck, jaw, stomach, or arm     ¨ Trouble breathing    ¨ Nausea or vomiting    ¨ Lightheadedness or a sudden cold sweat, especially with chest pain or trouble breathing    Seek care immediately if:   · You gain 3 or more pounds (1 4 kg) in a day, or more than your healthcare provider says you should  · Your heartbeat is fast, slow, or uneven all the time  Contact your healthcare provider if:   · You have symptoms of worsening HF:      ¨ Shortness of breath at rest, at night, or that is getting worse in any way     ¨ Weight gain of 5 or more pounds (2 2 kg) in a week     ¨ More swelling in your legs or ankles     ¨ Abdominal pain or swelling     ¨ More coughing     ¨ Loss of appetite     ¨ Feeling tired all the time    · You feel hopeless or depressed, or you have lost interest in things you used to enjoy  · You often feel worried or afraid  · You have questions or concerns about your condition or care  Medicines: You may  need any of the following:  · Medicines  may be given to help regulate your heart rhythm  You may also need medicines to lower your blood pressure, and to get rid of extra fluids  · Take your medicine as directed  Contact your healthcare provider if you think your medicine is not helping or if you have side effects   Tell him or her if you are allergic to any medicine  Keep a list of the medicines, vitamins, and herbs you take  Include the amounts, and when and why you take them  Bring the list or the pill bottles to follow-up visits  Carry your medicine list with you in case of an emergency  Follow up with your healthcare provider or cardiologist as directed: You may need to return for other tests  You may need home health care  A healthcare provider will monitor your vital signs, weight, and make sure your medicines are working  Write down your questions so you remember to ask them during your visits  Go to cardiac rehab as directed:  Cardiac rehab is a program run by specialists who will help you safely strengthen your heart  The program includes exercise, relaxation, stress management, and heart-healthy nutrition  Healthcare providers will also make sure your medicines are helping to reduce your symptoms  Manage your HF:  · Do not smoke  Nicotine and other chemicals in cigarettes and cigars can cause lung damage and make HF difficult to manage  Ask your healthcare provider for information if you currently smoke and need help to quit  E-cigarettes or smokeless tobacco still contain nicotine  Talk to your healthcare provider before you use these products  · Do not drink alcohol or take illegal drugs  Alcohol and drugs can worsen your symptoms quickly  · Weigh yourself every morning  Use the same scale, in the same spot  Do this after you use the bathroom, but before you eat or drink anything  Wear the same type of clothing  Do not wear shoes  Record your weight each day so you will notice any sudden weight gain  Swelling and weight gain are signs of fluid retention  If you are overweight, ask how to lose weight safely  · Check your blood pressure and heart rate every day  Ask for more information about how to measure your blood pressure and heart rate correctly  Ask what these numbers should be for you       · Manage any chronic health conditions you have  These include high blood pressure, diabetes, obesity, high cholesterol, metabolic syndrome, and COPD  You will have fewer symptoms if you manage these health conditions  Follow your healthcare provider's recommendations and follow up with him or her regularly  · Eat heart-healthy foods and limit sodium (salt)  An easy way to do this is to eat more fresh fruits and vegetables and fewer canned and processed foods  Replace butter and margarine with heart-healthy oils such as olive oil and canola oil  Other heart-healthy foods include walnuts, whole-grain breads, low-fat dairy products, beans, and lean meats  Fatty fish such as salmon and tuna are also heart healthy  Ask how much salt you can eat each day  Do not use salt substitutes  · Drink liquids as directed  You may need to limit the amount of liquids you drink if you retain fluid  Ask how much liquid to drink each day and which liquids are best for you  · Stay active  If you are not active, your symptoms are likely to worsen quickly  Walking, bicycling, and other types of physical activity help maintain your strength and improve your mood  Physical activity also helps you manage your weight  Work with your healthcare provider to create an exercise plan that is right for you  · Get vaccines as directed  Get a flu shot every year  You may also need the pneumonia vaccine  The flu and pneumonia can be severe for a person who has HF  Vaccines protect you from these infections  Join a support group:  Living with HF can be difficult  It may be helpful to talk with others who have HF  You may learn how to better manage your condition or get emotional support  For more information:   · [address]  [place] , [place]   Phone: [phone]  Web Address: https://www strong com/  org   © 2017 [address] [employer] Information is for End User's use only and may not be sold, redistributed or otherwise used for commercial purposes  All illustrations and images included in CareNotes® are the copyrighted property of [employer]  or Remington Greene  The above information is an  only  It is not intended as medical advice for individual conditions or treatments  Talk to your doctor, nurse or pharmacist before following any medical regimen to see if it is safe and effective for you  Low-Sodium Diet   WHAT YOU NEED TO KNOW:   A low-sodium diet limits foods that are high in sodium (salt)  You will need to follow a low-sodium diet if you have high blood pressure, kidney disease, or heart failure  You may also need to follow this diet if you have a condition that is causing your body to retain (hold) extra fluid  You may need to limit the amount of sodium you eat to 1,500 mg  Ask your healthcare provider how much sodium you can have each day  DISCHARGE INSTRUCTIONS:   How to use food labels to choose foods that are low in sodium:  Read food labels to find the amount of sodium they contain  The amount of sodium is listed in milligrams (mg)  The % Daily Value (DV) column tells you how much of your daily needs are met by 1 serving of the food for each nutrient listed  Choose foods that have less than 5% of the DV of sodium  These foods are considered low in sodium  Foods that have 20% or more of the DV of sodium are considered high in sodium  Some food labels may also list any of the following terms that tell you about the sodium content in the food:  · Sodium-free:  Less than 5 mg in each serving    · Very low sodium:  35 mg of sodium or less in each serving    · Low sodium:  140 mg of sodium or less in each serving    · Reduced sodium:   At least 25% less sodium in each serving than the regular type    · Light in sodium:  50% less sodium in each serving    · Unsalted or no added salt:  No extra salt is added during processing (the food may still contain sodium)  Foods to avoid: Salty foods are high in sodium  You should avoid the following:  · Processed foods:      ¨ Mixes for cornbread, biscuits, cake, and pudding     ¨ Instant foods, such as potatoes, cereals, noodles, and rice     ¨ Packaged foods, such as bread stuffing, rice and pasta mixes, snack dip mixes, and macaroni and cheese     ¨ Canned foods, such as canned vegetables, soups, broths, sauces, and vegetable or tomato juice    ¨ Snack foods, such as salted chips, popcorn, pretzels, pork rinds, salted crackers, and salted nuts    ¨ Frozen foods, such as dinners, entrees, vegetables with sauces, and breaded meats    ¨ Sauerkraut, pickled vegetables, and other foods prepared in brine    · Meats and cheeses:      ¨ Smoked or cured meat, such as corned beef, esparza, ham, hot dogs, and sausage    ¨ Canned meats or spreads, such as potted meats, sardines, anchovies, and imitation seafood    ¨ Deli or lunch meats, such as bologna, ham, turkey, and roast beef    ¨ Processed cheese, such as American cheese and cheese spreads    · Condiments, sauces, and seasonings:      ¨ Salt (¼ teaspoon of salt contains 575 mg of sodium)    ¨ Seasonings made with salt, such as garlic salt, celery salt, onion salt, and seasoned salt    ¨ Regular soy sauce, barbecue sauce, teriyaki sauce, steak sauce, Worcestershire sauce, and most flavored vinegars    ¨ Canned gravy and mixes     ¨ Regular condiments, such as mustard, ketchup, and salad dressings    ¨ Pickles and olives    ¨ Meat tenderizers and monosodium glutamate (MSG)  Foods to include:  Read the food label to find the amount of sodium in each serving  · Bread and cereal:  Try to choose breads with less than 80 mg of sodium per serving  ¨ Bread, roll, john, tortilla, or unsalted crackers      ¨ Ready-to-eat cereals with less than 5% DV of sodium (examples include shredded wheat and puffed rice)    ¨ Pasta    · Vegetables and fruits:      ¨ Unsalted fresh, frozen, or canned vegetables    ¨ Fresh, frozen, or canned fruits    ¨ Fruit juice    · Dairy:  One serving has about 150 mg of sodium  ¨ Milk, all types    ¨ Yogurt    ¨ Hard cheese, such as cheddar, Swiss, [employer], or mozzarella    · Meat and other protein foods:  Some raw meats may have added sodium  ¨ Plain meats, fish, and poultry     ¨ Egg    · Other foods:      ¨ Homemade pudding    ¨ Unsalted nuts, popcorn, or pretzels    ¨ Unsalted butter or margarine  Ways to decrease sodium:   · Add spices and herbs to foods instead of salt during cooking  Use salt-free seasonings to add flavor to foods  Examples include onion powder, garlic powder, basil, robledo powder, paprika, and parsley  Try lemon or lime juice or vinegar to give foods a tart flavor  Use hot peppers, pepper, or cayenne pepper to add a spicy flavor to foods  · Do not keep a salt shaker at your kitchen table  This may help keep you from adding salt to food at the table  It may take time to get used to enjoying the natural flavor of food instead of adding salt  Talk to your healthcare provider before you use salt substitutes  Some salt substitutes have a high amount of potassium and need to be avoided if you have kidney disease  · Choose low-sodium foods at restaurants  Meals from restaurants are often high in sodium  Some restaurants have nutrition information on the menu that tells you the amount of sodium in their foods  If possible, ask for your food to be prepared with less, or no salt  · Shop for unsalted or low-sodium foods and snacks at the grocery store  Examples include unsalted or low-sodium broths, soups, and canned vegetables  Choose fresh or frozen vegetables instead  Choose unsalted nuts or seeds or fresh fruits or vegetables as snacks  Read food labels and choose salt-free, very low-sodium, or low-sodium foods    © 2017 [employer] Information is for End User's use only and may not be sold, redistributed or otherwise used for commercial purposes  All illustrations and images included in CareNotes® are the copyrighted property of [employer]  or Remington Greene  The above information is an  only  It is not intended as medical advice for individual conditions or treatments  Talk to your doctor, nurse or pharmacist before following any medical regimen to see if it is safe and effective for you  Seasoning Without Salt   WHAT YOU NEED TO KNOW:   Why is it important to season foods without salt? Seasoning foods without salt during cooking and eating can help decrease the amount of sodium in your diet  Sodium is found in salt and in many other foods  Limit sodium if you have high blood pressure and heart failure  You may also need to limit sodium if you have liver problems or kidney disease  Sodium makes your blood pressure rise if you have high blood pressure  If you have heart failure, eating too much sodium causes extra fluid to build up in your body  This extra body fluid may cause swelling, shortness of breath, or weight gain  People with other health conditions such as diabetes or heart disease may also need to make other diet changes  Ask your healthcare provider if you need to make other diet changes  What are some high-sodium seasonings and condiments that I should limit or avoid? · Adryan sauce, soup, and other packaged sauce mixes  · Barbecue, taco, and steak sauce  · Dry salad dressing mixes  · Garlic, onion, and celery salt  · Imitation esparza bits  · Meat tenderizers and sauces  · Monosodium glutamate (MSG)  MSG may be found in [place] food, soy sauce, and oyster sauce  · Mustard, prepared horseradish sauce, and ketchup  · Pickle relish  · Salt, seasoned salt, kosher salt, and sea salt  · Soy, Worcestershire, and teriyaki sauces  Limit low sodium varieties because they still contain high amounts of sodium  · T[place]ar, fish, and cocktail sauce    What are some low-sodium herbs that I can use? · Basil with eggs, fish and shellfish, beef, liver, veal, tomato sauce, soups, pasta, green salad, and vegetables  · Bay leaf with beef, white fish, soups, and tomato dishes  · Cilantro, chili powder, and cumin with egg dishes, [place] food, pork, fish, and rice  · Dill weed with breads, chicken, cooked fresh vegetables, cucumbers, fish or shellfish, potato salad, and soup  · Marjoram with beef, lamb, chicken, turkey, pasta, green salad, cream sauce, eggs, soups, and vegetables  · Parsley with stuffing, rice, egg salad, green salad, vegetable salad, baked beans, vegetables, soups, and tomato sauces  · Rosemary and thyme with veal, pork, beef, potatoes, cream or tomato sauce, soups, and vegetables  · Jean with chicken, turkey, fish, pork, veal, soups, onions, stuffing, tomato sauce, and vegetables  · Savory with beef, stuffing, chicken soup, green beans, poultry, red meats, and potatoes  · Tarragon with eggs, fish or shellfish, chicken, turkey, green salad, soups, sauces, and salad dressings  What are some low-sodium herb blends that I can use? · Chili blend: mix black pepper, chili powder, cilantro, cumin, dry mustard, garlic powder, onion powder, oregano, and paprika  · [facility] blend: mix celery seed, dill weed, dried onion, sugar, and tarragon  · [facility] food blend:  mix basil, black pepper, garlic powder, ground red pepper, marjoram, oregano, savory, and thyme  · Onion herb blend:  mix basil, black pepper, cumin, dill weed, dried onion flakes, and garlic powder  What are some low-sodium spices that I can use? · Cinnamon in custard and pudding, sweet breads, rolls, fruits, fruit salad, pork, pumpkin, winter squash, and sweet potatoes  · Cloves   in sweet breads, fruit, ham, pork, baked beans, tomatoes, winter squash, and sweet potatoes  · [place] powder with beef, veal, chicken, turkey, and fish or potato soup      · [place] with baked fish, carrots, pot roast, ham, chicken, turkey, rice, and fruit  · Mace in chicken soup, baked fruit desserts, carrots, cauliflower, custard, fruit jams, lamb, potatoes, and pumpkin  · Nutmeg in sweet breads, fruits, vegetables, and custard  What are some low-sodium seasonings that I can use? · Chives in eggs, pasta, cream or potato soup, corn, potatoes, and salad dressing  · Garlic (minced, powdered, or freshly chopped) with shellfish, lamb, soup, dips and sauces, Italian dishes, meats, and poultry  · Lemon with chicken, fruit salads, grilled or baked fish, shellfish, spinach, and tossed salads  · Onion (dried, powdered, or freshly chopped) with beef, liver, egg salad, green salad, casseroles, pasta dishes, and stews  · Vinegar (such as balsamic, cider, flavored, red wine, or white) with cucumbers, cooked greens, potatoes, salad dressings, spinach, and seafood  How can I use food labels to choose seasonings that are low in sodium? Reading food labels is a good way to learn whether foods contain sodium and how much sodium they contain  The ingredient list on the food label will tell you if the seasoning or food contains sodium  The food contains sodium if an ingredient has Na (symbol for sodium), salt, soda, or sodium in its name  Food labels list the amount of sodium in the food in milligrams (mg)  Following are some words about sodium that may appear on a label and what they mean  Ask your healthcare provider for more information about how to read food labels  · Sodium free or salt free: Less than five mg in each serving  · Very low sodium:  Thirty-five (35) mg of sodium or less in each serving  · Low sodium:  One-hundred and forty (140) mg of sodium or less in each serving  · Reduced or less sodium: At least 25 percent less sodium in each serving  For example, a food may have 800 mg of sodium in each serving   The same food made with reduced sodium would contain 600 mg of sodium  · Light in sodium: Fifty (50) percent less sodium in each serving  For example, a food may have 500 mg of sodium in each serving  The same food that is light in sodium would have 250 mg of sodium  · Unsalted or no added salt: No extra salt is added  What are some other ways to decrease sodium? · Check with your healthcare provider before using salt substitutes if you need to limit potassium in your diet  They may be too high in potassium for you to use safely  · Fast food and packaged foods are often high in sodium  Buy low salt or low sodium foods whenever possible  Eat homemade or fresh foods and meals to avoid getting too much sodium  Buy fresh vegetables, frozen vegetables or low sodium or no salt added canned vegetables  · Avoid regular canned soups or soups made from dry mixes  Buy low sodium soups or make your own at home without salt  Use low sodium broth, bouillon, or consommé  · Avoid canned, smoked, or processed poultry (chicken, turkey), fish, or meats  Limit cured meats such as esparza and ham      · Regular cheese contains a medium to high amount of salt  If you eat cheese, buy low sodium kinds as often as possible  Add only one third to one half the amount of cheese listed on recipes  CARE AGREEMENT:   You have the right to help plan your care  To help with this plan, you must learn your health problems and how to season without salt  You can then discuss treatment options with your healthcare providers  Work with them to decide what care will be used to treat you  You always have the right to refuse treatment  The above information is an  only  It is not intended as medical advice for individual conditions or treatments  Talk to your doctor, nurse or pharmacist before following any medical regimen to see if it is safe and effective for you    © 2017 [address] [employer] Information is for End User's use only and may not be sold, redistributed or otherwise used for commercial purposes  All illustrations and images included in CareNotes® are the copyrighted property of [employer]  or Remington Greene

## 2018-08-03 NOTE — CASE MANAGEMENT
Continued Stay Review - Attention Jackie  Date: 8/3/18    Vital Signs: /62 (BP Location: Left arm)   Pulse 83   Temp 98 4 °F (36 9 °C) (Oral)   Resp 16   Ht 5' 4" (1 626 m)   Wt 79 6 kg (175 lb 7 8 oz)   SpO2 95%   BMI 30 12 kg/m²       Date/Time  Temp  Pulse  Resp  BP  SpO2  O2 Device    08/03/18 1000  --  --  --  --  --  None (Room air)    08/03/18 0700  98 4 °  83  16  129/62  95 %  None (Room air)    08/03/18 0300  98 °F   85  18  111/57  90 %  None (Room air)    08/02/18 2300  98 4 °F  92  17  109/55  92 %  None (Room air)    08/02/18 2200  98 9 °F  85  16  111/56  94 %  None (Room air)    08/02/18 1900  98 6 °  83  18  102/59  96 %  None (Room air)      Physical exam: Bilateral 1+ pitting edema  JVD present     Medications:   Scheduled Meds:   Current Facility-Administered Medications:  acetaminophen 650 mg Oral Q4H PRN   amLODIPine 10 mg Oral HS   aspirin 81 mg Oral HS   atorvastatin 80 mg Oral Daily With Dinner   citalopram 40 mg Oral HS   enoxaparin 40 mg Subcutaneous Q24H KEISHA   furosemide 20 mg Intravenous BID (diuretic)   pantoprazole 40 mg Oral Early Morning       Abnormal Labs/Diagnostic Results:     8/2 ECHO:Systolic function was at the lower limits of normal  Ejection fraction was estimated in the range of 50 % to 55 %  There were no regional wall motion abnormalities  Left ventricular diastolic function parameters were normal  Trace regurgitation mitral and tricuspid valves  Mitral valve also with moderate annular calcification and mild stenosis  Mild regurgitation of aortic valve, mild to moderate regurgitation of pulmonic valve        Age/Sex: 50 y o  female       =====================================================  8/2/18 Cardiology Consult:    Assessment/Plan:    1-Acute congestive heart failure, systolic versus diastolic to be determined  Echocardiogram pending  Continue with IV Lasix 20 mg b i d   Continue with amlodipine, aspirin, Lipitor  Daily weights    Salt restriction  Strict I&Os     2-murmur, echocardiogram pending  Patient states that she followed with cardiologist before moving to the area      3-hypertension, stable  Continue all medications

## 2018-08-15 NOTE — CASE MANAGEMENT
Notification of Discharge  This is a Notification of Discharge from our facility [address]  Please be advised that this patient has been discharge from our facility  Below you will find the admission and discharge date and time including the patients disposition  PRESENTATION DATE: 8/1/2018  8:36 PM  IP ADMISSION DATE: 8/1/18 2308  DISCHARGE DATE: 8/3/2018  6:56 PM  DISPOSITION: Home/Self Care    [address] [facility] in the [facility] Utilization Review Department  Phone: [phone]; Fax [phone]  ATTENTION: The Network Utilization Review Department is now centralized for our 9 Facilities  Make a note that we have a new phone and fax numbers for our Department  Please call with any questions or concerns to [phone] and carefully follow the prompts so that you are directed to the right person  All voicemails are confidential  Fax any determinations, approvals, denials, and requests for initial or continue stay review clinical to [phone]  Due to HIGH CALL volume, it would be easier if you could please send faxed requests to expedite your requests and in part, help us provide discharge notifications faster

## 2018-08-21 ENCOUNTER — OFFICE VISIT (OUTPATIENT)
Dept: CARDIOLOGY CLINIC | Facility: CLINIC | Age: 53
End: 2018-08-21
Payer: COMMERCIAL

## 2018-08-21 ENCOUNTER — TELEPHONE (OUTPATIENT)
Dept: CARDIOLOGY CLINIC | Facility: CLINIC | Age: 53
End: 2018-08-21

## 2018-08-21 VITALS
HEART RATE: 89 BPM | WEIGHT: 175 LBS | HEIGHT: 64 IN | SYSTOLIC BLOOD PRESSURE: 124 MMHG | BODY MASS INDEX: 29.88 KG/M2 | OXYGEN SATURATION: 90 % | DIASTOLIC BLOOD PRESSURE: 74 MMHG

## 2018-08-21 DIAGNOSIS — I50.31 ACUTE DIASTOLIC CONGESTIVE HEART FAILURE (HCC): ICD-10-CM

## 2018-08-21 DIAGNOSIS — I35.0 MODERATE TO SEVERE AORTIC STENOSIS: Primary | ICD-10-CM

## 2018-08-21 DIAGNOSIS — I10 ESSENTIAL HYPERTENSION: ICD-10-CM

## 2018-08-21 PROCEDURE — 99214 OFFICE O/P EST MOD 30 MIN: CPT | Performed by: INTERNAL MEDICINE

## 2018-08-21 NOTE — PROGRESS NOTES
Cardiology Consultation     Cheryl Serrano  [id number]  1965  [address] [facility] [address] [facility] [address]    Chief complaints:  Hospital follow-up for acute systolic and diastolic CHF, moderate to severe aortic stenosis  History of present illness: 50 y old female who has a history of hypertension, anxiety, pericardial effusion status post pericardial window 2011 WAS RECENTLY ADMITTED TO [facility] [address] WITH SHORTNESS OF BREATH AND LOWER EXTREMITY EDEMA SUSPICIOUS FOR ACUTE ON CHRONIC SYSTOLIC AND DIASTOLIC CHF  She has responded well to diuretics    Echocardiogram showed mildly reduced LVEF of 50-55 percent  Aortic valve was moderate to severely stenotic with mean gradient of 31 mm of mercury and calculated valve area of 0 6 subcutaneously cm  Since discharge she feels better, she continues to have dyspnea but only on moderate to severe exertion  She denies having any leg edema  No orthopnea paroxysmal nocturnal dyspnea     Patient Active Problem List   Diagnosis    [facility]congest[facility] failure) ([facility] )    Hypertension    Moderate to severe aortic stenosis     Past Medical History:   Diagnosis Date    Anxiety     Cancer ([place] [address] )     CHF (congestive heart failure) ([place] [address] )     Hodgkin's disease ([place] [address] )     Hyperlipidemia     Hypertension      Social History     Social History    Marital status: /Civil Union     Spouse name: N/A    Number of children: N/A    Years of education: N/A     Occupational History    Not on file       Social History Main Topics    Smoking status: Never Smoker    Smokeless tobacco: Never Used    Alcohol use No    Drug use: No    Sexual activity: Not on file     Other Topics Concern    Not on file     Social History Narrative    No narrative on file      Family History   Problem Relation Age of Onset    Hyperlipidemia Mother     Hypertension Mother    Staci Denney Diabetes Mother      Past Surgical History:   Procedure Laterality Date    CARDIAC SURGERY      COLON SURGERY      HYSTERECTOMY      NECK SURGERY      TUMOR REMOVAL         Current Outpatient Prescriptions:     amLODIPine (NORVASC) 10 mg tablet, Take 10 mg by mouth, Disp: , Rfl:     aspirin (ECOTRIN LOW STRENGTH) 81 mg EC tablet, Take 81 mg by mouth daily, Disp: , Rfl:     atorvastatin (LIPITOR) 80 mg tablet, Take 80 mg by mouth, Disp: , Rfl:     citalopram (CeleXA) 40 mg tablet, Take 40 mg by mouth, Disp: , Rfl:     furosemide (LASIX) 40 mg tablet, Take 1 tablet (40 mg total) by mouth daily for 30 days, Disp: 30 tablet, Rfl: 1    pantoprazole (PROTONIX) 40 mg tablet, Take 40 mg by mouth, Disp: , Rfl:   No Known Allergies  Vitals:    08/21/18 1622   BP: 124/74   Pulse: 89   SpO2: 90%   Weight: 79 4 kg (175 lb)   Height: 5' 4" (1 626 m)       Labs:  Admission on 08/01/2018, Discharged on 08/03/2018   Component Date Value    WBC 08/01/2018 11 19*    RBC 08/01/2018 4 61     Hemoglobin 08/01/2018 12 6     Hematocrit 08/01/2018 40 4     MCV 08/01/2018 88     MCH 08/01/2018 27 3     MCHC 08/01/2018 31 2*    RDW 08/01/2018 14 3     MPV 08/01/2018 9 9     Platelets 96/26/2767 435*    nRBC 08/01/2018 0     Neutrophils Relative 08/01/2018 43     Immat GRANS % 08/01/2018 0     Lymphocytes Relative 08/01/2018 42     Monocytes Relative 08/01/2018 10     Eosinophils Relative 08/01/2018 4     Basophils Relative 08/01/2018 1     Neutrophils Absolute 08/01/2018 4 85     Immature Grans Absolute 08/01/2018 0 03     Lymphocytes Absolute 08/01/2018 4 75*    Monocytes Absolute 08/01/2018 1 06     Eosinophils Absolute 08/01/2018 0 40     Basophils Absolute 08/01/2018 0 10     Protime 08/01/2018 13 8     INR 08/01/2018 1 07     PTT 08/01/2018 27     Sodium 08/01/2018 139     Potassium 08/01/2018 3 5     Chloride 08/01/2018 102     CO2 08/01/2018 30     Anion Gap 08/01/2018 7     BUN 08/01/2018 13  Creatinine 08/01/2018 0 74     Glucose 08/01/2018 91     Calcium 08/01/2018 8 8     eGFR 08/01/2018 95     Total Bilirubin 08/01/2018 0 30     Bilirubin, Direct 08/01/2018 0 04     Alkaline Phosphatase 08/01/2018 115     AST 08/01/2018 26     ALT 08/01/2018 28     Total Protein 08/01/2018 8 3*    Albumin 08/01/2018 3 7     Troponin I 08/01/2018 0 02     NT-proBNP 08/01/2018 1303*    Color, UA 08/01/2018 Yellow     Clarity, UA 08/01/2018 Clear     Specific Gravity, UA 08/01/2018 1 015     pH, UA 08/01/2018 6 5     Leukocytes, UA 08/01/2018 Small*    Nitrite, UA 08/01/2018 Negative     Protein, UA 08/01/2018 Negative     Glucose, UA 08/01/2018 Negative     Ketones, UA 08/01/2018 Negative     Urobilinogen, UA 08/01/2018 1 0     Bilirubin, UA 08/01/2018 Negative     Blood, UA 08/01/2018 Negative     RBC, UA 08/01/2018 0-1*    WBC, UA 08/01/2018 0-1*    Epithelial Cells 08/01/2018 Occasional     Bacteria, UA 08/01/2018 None Seen     Ventricular Rate 08/01/2018 83     Atrial Rate 08/01/2018 83     MO Interval 08/01/2018 152     QRSD Interval 08/01/2018 88     QT Interval 08/01/2018 420     QTC Interval 08/01/2018 493     P Axis 08/01/2018 65     QRS Axis 08/01/2018 80     T Wave Axis 08/01/2018 -47     Troponin I 08/02/2018 0 02     Sodium 08/02/2018 136     Potassium 08/02/2018 3 5     Chloride 08/02/2018 100     CO2 08/02/2018 27     Anion Gap 08/02/2018 9     BUN 08/02/2018 13     Creatinine 08/02/2018 0 78     Glucose 08/02/2018 134     Calcium 08/02/2018 9 1     eGFR 08/02/2018 89     Troponin I 08/02/2018 0 02     WBC 08/02/2018 12 10*    RBC 08/02/2018 5 00     Hemoglobin 08/02/2018 13 7     Hematocrit 08/02/2018 44 0     MCV 08/02/2018 88     MCH 08/02/2018 27 4     MCHC 08/02/2018 31 1*    RDW 08/02/2018 14 2     MPV 08/02/2018 10 1     Platelets 71/85/5108 439*    nRBC 08/02/2018 0     Neutrophils Relative 08/02/2018 54     Immat GRANS % 08/02/2018 0     Lymphocytes Relative 08/02/2018 30     Monocytes Relative 08/02/2018 11     Eosinophils Relative 08/02/2018 4     Basophils Relative 08/02/2018 1     Neutrophils Absolute 08/02/2018 6 53     Immature Grans Absolute 08/02/2018 0 03     Lymphocytes Absolute 08/02/2018 3 61     Monocytes Absolute 08/02/2018 1 37*    Eosinophils Absolute 08/02/2018 0 43     Basophils Absolute 08/02/2018 0 13*    Cholesterol 08/02/2018 155     Triglycerides 08/02/2018 142     HDL, Direct 08/02/2018 39*    LDL Calculated 08/02/2018 88     WBC 08/03/2018 10 40*    RBC 08/03/2018 4 99     Hemoglobin 08/03/2018 13 6     Hematocrit 08/03/2018 44 0     MCV 08/03/2018 88     MCH 08/03/2018 27 3     MCHC 08/03/2018 30 9*    RDW 08/03/2018 14 3     Platelets 55/42/9480 491*    MPV 08/03/2018 10 2     Sodium 08/03/2018 138     Potassium 08/03/2018 3 9     Chloride 08/03/2018 100     CO2 08/03/2018 32     Anion Gap 08/03/2018 6     BUN 08/03/2018 13     Creatinine 08/03/2018 0 92     Glucose 08/03/2018 118     Calcium 08/03/2018 8 9     eGFR 08/03/2018 73      Imaging: Xr Chest 2 Views    Result Date: 8/1/2018  Narrative: CHEST INDICATION:   leg swelling, sob  COMPARISON:  None EXAM PERFORMED/VIEWS:  XR CHEST PA & LATERAL FINDINGS: Cardiomediastinal silhouette appears unremarkable  Mild vascular congestion  No pneumothorax or pleural effusion  Osseous structures appear within normal limits for patient age  Surgical clips throughout the upper abdomen  Impression: Mild vascular congestion  Workstation performed: [id number]       Review of Systems:  Review of Systems   Constitutional: Negative for diaphoresis and fatigue  HENT: Negative for congestion and facial swelling  Eyes: Negative for photophobia and visual disturbance  Respiratory: Positive for shortness of breath  Negative for chest tightness  Cardiovascular: Positive for leg swelling  Negative for chest pain and palpitations  Gastrointestinal: Negative for abdominal pain and nausea  Endocrine: Negative for cold intolerance and heat intolerance  Musculoskeletal: Negative for arthralgias and myalgias  Skin: Negative for pallor and rash  Neurological: Negative for dizziness and tremors  Psychiatric/Behavioral: Negative for sleep disturbance  The patient is not nervous/anxious  Physical Exam:  Physical Exam   Constitutional: She is oriented to person, place, and time  She appears well-developed and well-nourished  HENT:   Head: Normocephalic and atraumatic  Eyes: Conjunctivae and EOM are normal  Pupils are equal, round, and reactive to light  Neck: Normal range of motion  Neck supple  No JVD present  No thyromegaly present  Cardiovascular: Normal rate, regular rhythm, S1 normal, S2 normal and intact distal pulses  Exam reveals no gallop and no friction rub  Murmur heard  Crescendo decrescendo systolic murmur is present with a grade of 3/6   Pulses:       Carotid pulses are 2+ on the right side, and 2+ on the left side  Pulmonary/Chest: Effort normal and breath sounds normal  No respiratory distress  She has no wheezes  She has no rales  Abdominal: Soft  Bowel sounds are normal  She exhibits no distension  There is no tenderness  There is no rebound and no guarding  Musculoskeletal: Normal range of motion  Neurological: She is alert and oriented to person, place, and time  She has normal reflexes  No cranial nerve deficit  Skin: Skin is warm and dry  Psychiatric: She has a normal mood and affect  Discussion/Summary:  1  Acute on chronic systolic/diastolic CHF  Moderate to severe aortic stenosis  Patient continues to have dyspnea on exertion  Will schedule the patient for left and right heart catheterization to evaluate the severity of aortic stenosis and rule out significant coronary disease  Risks, benefits and alternatives explained to the patient informed consent was obtained    Continue Lasix for now    2  Hypertension, blood pressure well controlled    3    Hyperlipidemia on statins    Follow up with me after cardiac catheterization

## 2018-08-21 NOTE — TELEPHONE ENCOUNTER
Patient needs to be set up for a cath at [facility] per Dr Sekou Castellano  Patient had labs at the hospital   Thank you

## 2018-08-22 ENCOUNTER — TELEPHONE (OUTPATIENT)
Dept: CARDIOLOGY CLINIC | Facility: CLINIC | Age: 53
End: 2018-08-22

## 2018-08-31 ENCOUNTER — TELEPHONE (OUTPATIENT)
Dept: SURGERY | Facility: HOSPITAL | Age: 53
End: 2018-08-31

## 2018-08-31 ENCOUNTER — TELEPHONE (OUTPATIENT)
Dept: NON INVASIVE DIAGNOSTICS | Facility: HOSPITAL | Age: 53
End: 2018-08-31

## 2018-08-31 RX ORDER — SODIUM CHLORIDE 9 MG/ML
75 INJECTION, SOLUTION INTRAVENOUS CONTINUOUS
Status: CANCELLED | OUTPATIENT
Start: 2018-08-31

## 2018-09-04 ENCOUNTER — TELEPHONE (OUTPATIENT)
Dept: SURGERY | Facility: HOSPITAL | Age: 53
End: 2018-09-04

## 2018-09-04 ENCOUNTER — HOSPITAL ENCOUNTER (OUTPATIENT)
Dept: INTERVENTIONAL RADIOLOGY/VASCULAR | Facility: HOSPITAL | Age: 53
Discharge: HOME | End: 2018-09-04
Attending: INTERNAL MEDICINE | Admitting: INTERNAL MEDICINE
Payer: COMMERCIAL

## 2018-09-04 VITALS
TEMPERATURE: 98.4 F | OXYGEN SATURATION: 99 % | HEART RATE: 78 BPM | BODY MASS INDEX: 30 KG/M2 | DIASTOLIC BLOOD PRESSURE: 57 MMHG | SYSTOLIC BLOOD PRESSURE: 120 MMHG | WEIGHT: 175.71 LBS | HEIGHT: 64 IN | RESPIRATION RATE: 22 BRPM

## 2018-09-04 DIAGNOSIS — I35.0 MODERATE TO SEVERE AORTIC STENOSIS: ICD-10-CM

## 2018-09-04 DIAGNOSIS — I50.31 ACUTE DIASTOLIC CONGESTIVE HEART FAILURE (HCC): ICD-10-CM

## 2018-09-04 PROCEDURE — C1894 INTRO/SHEATH, NON-LASER: HCPCS | Performed by: INTERNAL MEDICINE

## 2018-09-04 PROCEDURE — C1769 GUIDE WIRE: HCPCS | Performed by: INTERNAL MEDICINE

## 2018-09-04 PROCEDURE — C1887 CATHETER, GUIDING: HCPCS | Performed by: INTERNAL MEDICINE

## 2018-09-04 PROCEDURE — C1760 CLOSURE DEV, VASC: HCPCS | Performed by: INTERNAL MEDICINE

## 2018-09-04 PROCEDURE — 82810 BLOOD GASES O2 SAT ONLY: CPT | Performed by: INTERNAL MEDICINE

## 2018-09-04 PROCEDURE — 93460 R&L HRT ART/VENTRICLE ANGIO: CPT | Performed by: INTERNAL MEDICINE

## 2018-09-04 PROCEDURE — 99152 MOD SED SAME PHYS/QHP 5/>YRS: CPT | Performed by: INTERNAL MEDICINE

## 2018-09-04 PROCEDURE — 99153 MOD SED SAME PHYS/QHP EA: CPT | Performed by: INTERNAL MEDICINE

## 2018-09-04 RX ORDER — MIDAZOLAM HYDROCHLORIDE 1 MG/ML
INJECTION INTRAMUSCULAR; INTRAVENOUS CODE/TRAUMA/SEDATION MEDICATION
Status: COMPLETED | OUTPATIENT
Start: 2018-09-04 | End: 2018-09-04

## 2018-09-04 RX ORDER — SODIUM CHLORIDE 9 MG/ML
75 INJECTION, SOLUTION INTRAVENOUS CONTINUOUS
Status: DISPENSED | OUTPATIENT
Start: 2018-09-04 | End: 2018-09-04

## 2018-09-04 RX ORDER — FENTANYL CITRATE 50 UG/ML
INJECTION, SOLUTION INTRAMUSCULAR; INTRAVENOUS CODE/TRAUMA/SEDATION MEDICATION
Status: COMPLETED | OUTPATIENT
Start: 2018-09-04 | End: 2018-09-04

## 2018-09-04 RX ORDER — LIDOCAINE HYDROCHLORIDE 10 MG/ML
INJECTION, SOLUTION INFILTRATION; PERINEURAL CODE/TRAUMA/SEDATION MEDICATION
Status: COMPLETED | OUTPATIENT
Start: 2018-09-04 | End: 2018-09-04

## 2018-09-04 RX ORDER — SODIUM CHLORIDE 9 MG/ML
75 INJECTION, SOLUTION INTRAVENOUS CONTINUOUS
Status: DISCONTINUED | OUTPATIENT
Start: 2018-09-04 | End: 2018-09-08 | Stop reason: HOSPADM

## 2018-09-04 RX ADMIN — IOHEXOL 100 ML: 350 INJECTION, SOLUTION INTRAVENOUS at 09:16

## 2018-09-04 RX ADMIN — SODIUM CHLORIDE 75 ML/HR: 0.9 INJECTION, SOLUTION INTRAVENOUS at 08:12

## 2018-09-04 RX ADMIN — MIDAZOLAM HYDROCHLORIDE 1 MG: 1 INJECTION, SOLUTION INTRAMUSCULAR; INTRAVENOUS at 08:42

## 2018-09-04 RX ADMIN — MIDAZOLAM HYDROCHLORIDE 1 MG: 1 INJECTION, SOLUTION INTRAMUSCULAR; INTRAVENOUS at 08:45

## 2018-09-04 RX ADMIN — FENTANYL CITRATE 50 MCG: 50 INJECTION, SOLUTION INTRAMUSCULAR; INTRAVENOUS at 08:45

## 2018-09-04 RX ADMIN — LIDOCAINE HYDROCHLORIDE 10 ML: 10 INJECTION, SOLUTION INFILTRATION; PERINEURAL at 08:45

## 2018-09-04 RX ADMIN — FENTANYL CITRATE 50 MCG: 50 INJECTION, SOLUTION INTRAMUSCULAR; INTRAVENOUS at 09:19

## 2018-09-04 NOTE — DISCHARGE SUMMARY
Discharge Summary - Mikhail Severino 50 y o  female MRN: [id number]    Unit/Bed#:  Encounter: [id number]    Admission Date: 9/4/2018    Admitting Diagnosis: Moderate to severe aortic stenosis [Q28 6]  Acute diastolic congestive heart failure ([address] ) [I50 31]    HPI:     Procedures Performed:   Orders Placed This Encounter   Procedures    Cardiac RHC/LHC       Summary of Hospital Course: Patient with history of hypertension, anxiety, pericardial effusion S/P pericardial window 2011 with recent admission to hospital for SOB and lower extremity edema it is suspicious for acute on chronic systolic and diastolic Congestive heart failure treated with diuresis presents for outpatient right and left heart cardiac catheterization  Last EF 50-55%  RHC/LHC revealed mild luminal irregularities, severe aortic stenosis  No complications S/P catheterization  Significant Findings, Care, Treatment and Services Provided: Severe aortic stenosis    Complications: None    Discharge Diagnosis: Severe aortic stenosis, CAD    Resolved Problems  Date Reviewed: 8/2/2018    None          Condition at Discharge: good         Discharge instructions/Information to patient and family:   See after visit summary for information provided to patient and family  Provisions for Follow-Up Care:  See after visit summary for information related to follow-up care and any pertinent home health orders  PCP: Stanford Zapien MD    Disposition: Home    Planned Readmission: No    Discharge Statement   I spent 35 minutes discharging the patient  This time was spent on the day of discharge  I had direct contact with the patient on the day of discharge  Additional documentation is required if more than 30 minutes were spent on discharge  Discharge Medications:  See after visit summary for reconciled discharge medications provided to patient and family

## 2018-09-04 NOTE — DISCHARGE INSTRUCTIONS
After Heart Catheterization   AMBULATORY CARE:   Call 911 for any of the following:   · You have any of the following signs of a heart attack:      ¨ Squeezing, pressure, or pain in your chest that lasts longer than 5 minutes or returns    ¨ Discomfort or pain in your back, neck, jaw, stomach, or arm     ¨ Trouble breathing    ¨ Nausea or vomiting    ¨ Lightheadedness or a sudden cold sweat, especially with chest pain or trouble breathing    · You have any of the following signs of a stroke:      ¨ Numbness or drooping on one side of your face     ¨ Weakness in an arm or leg    ¨ Confusion or difficulty speaking    ¨ Dizziness, a severe headache, or vision loss    · You feel lightheaded, short of breath, and have chest pain  · You cough up blood  · You have trouble breathing  · You cannot stop the bleeding from your wound even after you hold firm pressure for 10 minutes  Seek care immediately if:   · Blood soaks through your bandage  · Your stitches come apart  · Your arm or leg feels numb, cool, or looks pale  · Your wound gets swollen quickly  Contact your healthcare provider if:   · You have a fever or chills  · Your wound is red, swollen, or draining pus  · Your wound looks more bruised or you have new bruising on the side of your leg or arm  · You have nausea or are vomiting  · Your skin is itchy, swollen, or you have a rash  · You have questions or concerns about your condition or care  Medicines: You may need any of the following:  · Blood thinners    help prevent blood clots  Examples of blood thinners include heparin and warfarin  Clots can cause strokes, heart attacks, and death  The following are general safety guidelines to follow while you are taking a blood thinner:    ¨ Watch for bleeding and bruising while you take blood thinners  Watch for bleeding from your gums or nose  Watch for blood in your urine and bowel movements   Use a soft washcloth on your skin, and a soft toothbrush to brush your teeth  This can keep your skin and gums from bleeding  If you shave, use an electric shaver  Do not play contact sports  ¨ Tell your dentist and other healthcare providers that you take anticoagulants  Wear a bracelet or necklace that says you take this medicine  ¨ Do not start or stop any medicines unless your healthcare provider tells you to  Many medicines cannot be used with blood thinners  ¨ Tell your healthcare provider right away if you forget to take the medicine, or if you take too much  ¨ Warfarin  is a blood thinner that you may need to take  The following are things you should be aware of if you take warfarin  § Foods and medicines can affect the amount of warfarin in your blood  Do not make major changes to your diet while you take warfarin  Warfarin works best when you eat about the same amount of vitamin K every day  Vitamin K is found in green leafy vegetables and certain other foods  Ask for more information about what to eat when you are taking warfarin  § You will need to see your healthcare provider for follow-up visits when you are on warfarin  You will need regular blood tests  These tests are used to decide how much medicine you need  · Acetaminophen  helps decrease pain and fever  This medicine is available without a doctor's order  Ask how much medicine is safe to take, and how often to take it  Acetaminophen can cause liver damage if not taken correctly  · Take your medicine as directed  Contact your healthcare provider if you think your medicine is not helping or if you have side effects  Tell him or her if you are allergic to any medicine  Keep a list of the medicines, vitamins, and herbs you take  Include the amounts, and when and why you take them  Bring the list or the pill bottles to follow-up visits  Carry your medicine list with you in case of an emergency  Bathing:   You may be able to shower the day after your procedure  Remove your pressure bandage before you shower  Do not take baths or go in hot tubs or pools  Carefully wash the wound with soap and water  Pat the area dry  Care for your wound as directed:  Change your bandage when it gets wet or dirty  A small bandage can be placed on your wound after you remove the pressure bandage  Do not put powders, lotions, or creams on your wound  They may cause your wound to get infected  Monitor your wound every day for signs of infection, such as redness, swelling, or pus  Mild bruising is normal and expected  If bleeding from your wound occurs:  Apply firm, steady pressure to stop the bleeding  Apply pressure with a clean gauze or towel for 5 to 10 minutes  Call 911 if bleeding becomes heavy or does not stop  Activity:  Do not lift anything heavier than 5 pounds until directed by your healthcare provider  Heavy lifting can put stress on your wound and cause bleeding  Do not push or pull with the arm that was used for the procedure  Do not do vigorous activity for at least 48 hours  Vigorous activity may cause bleeding from your wound  Rest and do quiet activities  Short walks to the bathroom and around the house are okay  Limit your stair climbing to prevent bleeding  Ask your healthcare provider when you can return to your normal activities  Do not strain when you have a bowel movement:  Your wound may bleed if you strain to have a bowel movement  Keep your legs flat on the floor and your hips at a 90° angle  Talk to your healthcare provider if you are constipated  You may need medicine to make it easier for you to have a bowel movement and to prevent straining  Drink liquids as directed:  Liquids will help flush the contrast liquid from your body  Ask how much liquid to drink each day and which liquids are best for you  Driving:  Ask your healthcare provider when it is okay for you to drive   He may tell you to wait 48 hours before you drive to decrease your risk for bleeding  Returning to work: You may not be able to return to work for at least 2 days after your procedure if your job involves heavy lifting  Ask your healthcare provider when it is okay for you to return to work  © 2017 [employer] Information is for End User's use only and may not be sold, redistributed or otherwise used for commercial purposes  All illustrations and images included in CareNotes® are the copyrighted property of A [employer] M [employer] Inc  or [employer]  The above information is an  only  It is not intended as medical advice for individual conditions or treatments  Talk to your doctor, nurse or pharmacist before following any medical regimen to see if it is safe and effective for you

## 2018-09-04 NOTE — H&P (VIEW-ONLY)
Cardiology Consultation     Cheryl Serrano  [id number]  1965  [facility] [address] [address]    Chief complaints:  Hospital follow-up for acute systolic and diastolic CHF, moderate to severe aortic stenosis  History of present illness: 50 y old female who has a history of hypertension, anxiety, pericardial effusion status post pericardial window 2011 WAS RECENTLY ADMITTED TO [facility] WITH SHORTNESS OF BREATH AND LOWER EXTREMITY EDEMA SUSPICIOUS FOR ACUTE ON CHRONIC SYSTOLIC AND DIASTOLIC CHF  She has responded well to diuretics    Echocardiogram showed mildly reduced LVEF of 50-55 percent  Aortic valve was moderate to severely stenotic with mean gradient of 31 mm of mercury and calculated valve area of 0 6 subcutaneously cm  Since discharge she feels better, she continues to have dyspnea but only on moderate to severe exertion  She denies having any leg edema  No orthopnea paroxysmal nocturnal dyspnea     Patient Active Problem List   Diagnosis    [facility]    Hypertension    Moderate to severe aortic stenosis     Past Medical History:   Diagnosis Date    Anxiety     Cancer ([place] [address] )     CHF (congestive heart failure) ([place] [address] )     Hodgkin's disease ([place] [address] )     Hyperlipidemia     Hypertension      Social History     Social History    Marital status: /Civil Union     Spouse name: N/A    Number of children: N/A    Years of education: N/A     Occupational History    Not on file       Social History Main Topics    Smoking status: Never Smoker    Smokeless tobacco: Never Used    Alcohol use No    Drug use: No    Sexual activity: Not on file     Other Topics Concern    Not on file     Social History Narrative    No narrative on file      Family History   Problem Relation Age of Onset    Hyperlipidemia Mother     Hypertension Mother    Staci Denney Diabetes Mother      Past Surgical History:   Procedure Laterality Date    CARDIAC SURGERY      COLON SURGERY      HYSTERECTOMY      NECK SURGERY      TUMOR REMOVAL         Current Outpatient Prescriptions:     amLODIPine (NORVASC) 10 mg tablet, Take 10 mg by mouth, Disp: , Rfl:     aspirin (ECOTRIN LOW STRENGTH) 81 mg EC tablet, Take 81 mg by mouth daily, Disp: , Rfl:     atorvastatin (LIPITOR) 80 mg tablet, Take 80 mg by mouth, Disp: , Rfl:     citalopram (CeleXA) 40 mg tablet, Take 40 mg by mouth, Disp: , Rfl:     furosemide (LASIX) 40 mg tablet, Take 1 tablet (40 mg total) by mouth daily for 30 days, Disp: 30 tablet, Rfl: 1    pantoprazole (PROTONIX) 40 mg tablet, Take 40 mg by mouth, Disp: , Rfl:   No Known Allergies  Vitals:    08/21/18 1622   BP: 124/74   Pulse: 89   SpO2: 90%   Weight: 79 4 kg (175 lb)   Height: 5' 4" (1 626 m)       Labs:  Admission on 08/01/2018, Discharged on 08/03/2018   Component Date Value    WBC 08/01/2018 11 19*    RBC 08/01/2018 4 61     Hemoglobin 08/01/2018 12 6     Hematocrit 08/01/2018 40 4     MCV 08/01/2018 88     MCH 08/01/2018 27 3     MCHC 08/01/2018 31 2*    RDW 08/01/2018 14 3     MPV 08/01/2018 9 9     Platelets 92/18/5218 435*    nRBC 08/01/2018 0     Neutrophils Relative 08/01/2018 43     Immat GRANS % 08/01/2018 0     Lymphocytes Relative 08/01/2018 42     Monocytes Relative 08/01/2018 10     Eosinophils Relative 08/01/2018 4     Basophils Relative 08/01/2018 1     Neutrophils Absolute 08/01/2018 4 85     Immature Grans Absolute 08/01/2018 0 03     Lymphocytes Absolute 08/01/2018 4 75*    Monocytes Absolute 08/01/2018 1 06     Eosinophils Absolute 08/01/2018 0 40     Basophils Absolute 08/01/2018 0 10     Protime 08/01/2018 13 8     INR 08/01/2018 1 07     PTT 08/01/2018 27     Sodium 08/01/2018 139     Potassium 08/01/2018 3 5     Chloride 08/01/2018 102     CO2 08/01/2018 30     Anion Gap 08/01/2018 7     BUN 08/01/2018 13  Creatinine 08/01/2018 0 74     Glucose 08/01/2018 91     Calcium 08/01/2018 8 8     eGFR 08/01/2018 95     Total Bilirubin 08/01/2018 0 30     Bilirubin, Direct 08/01/2018 0 04     Alkaline Phosphatase 08/01/2018 115     AST 08/01/2018 26     ALT 08/01/2018 28     Total Protein 08/01/2018 8 3*    Albumin 08/01/2018 3 7     Troponin I 08/01/2018 0 02     NT-proBNP 08/01/2018 1303*    Color, UA 08/01/2018 Yellow     Clarity, UA 08/01/2018 Clear     Specific Gravity, UA 08/01/2018 1 015     pH, UA 08/01/2018 6 5     Leukocytes, UA 08/01/2018 Small*    Nitrite, UA 08/01/2018 Negative     Protein, UA 08/01/2018 Negative     Glucose, UA 08/01/2018 Negative     Ketones, UA 08/01/2018 Negative     Urobilinogen, UA 08/01/2018 1 0     Bilirubin, UA 08/01/2018 Negative     Blood, UA 08/01/2018 Negative     RBC, UA 08/01/2018 0-1*    WBC, UA 08/01/2018 0-1*    Epithelial Cells 08/01/2018 Occasional     Bacteria, UA 08/01/2018 None Seen     Ventricular Rate 08/01/2018 83     Atrial Rate 08/01/2018 83     ME Interval 08/01/2018 152     QRSD Interval 08/01/2018 88     QT Interval 08/01/2018 420     QTC Interval 08/01/2018 493     P Axis 08/01/2018 65     QRS Axis 08/01/2018 80     T Wave Axis 08/01/2018 -47     Troponin I 08/02/2018 0 02     Sodium 08/02/2018 136     Potassium 08/02/2018 3 5     Chloride 08/02/2018 100     CO2 08/02/2018 27     Anion Gap 08/02/2018 9     BUN 08/02/2018 13     Creatinine 08/02/2018 0 78     Glucose 08/02/2018 134     Calcium 08/02/2018 9 1     eGFR 08/02/2018 89     Troponin I 08/02/2018 0 02     WBC 08/02/2018 12 10*    RBC 08/02/2018 5 00     Hemoglobin 08/02/2018 13 7     Hematocrit 08/02/2018 44 0     MCV 08/02/2018 88     MCH 08/02/2018 27 4     MCHC 08/02/2018 31 1*    RDW 08/02/2018 14 2     MPV 08/02/2018 10 1     Platelets 92/61/6075 439*    nRBC 08/02/2018 0     Neutrophils Relative 08/02/2018 54     Immat GRANS % 08/02/2018 0     Lymphocytes Relative 08/02/2018 30     Monocytes Relative 08/02/2018 11     Eosinophils Relative 08/02/2018 4     Basophils Relative 08/02/2018 1     Neutrophils Absolute 08/02/2018 6 53     Immature Grans Absolute 08/02/2018 0 03     Lymphocytes Absolute 08/02/2018 3 61     Monocytes Absolute 08/02/2018 1 37*    Eosinophils Absolute 08/02/2018 0 43     Basophils Absolute 08/02/2018 0 13*    Cholesterol 08/02/2018 155     Triglycerides 08/02/2018 142     HDL, Direct 08/02/2018 39*    LDL Calculated 08/02/2018 88     WBC 08/03/2018 10 40*    RBC 08/03/2018 4 99     Hemoglobin 08/03/2018 13 6     Hematocrit 08/03/2018 44 0     MCV 08/03/2018 88     MCH 08/03/2018 27 3     MCHC 08/03/2018 30 9*    RDW 08/03/2018 14 3     Platelets 09/56/5948 491*    MPV 08/03/2018 10 2     Sodium 08/03/2018 138     Potassium 08/03/2018 3 9     Chloride 08/03/2018 100     CO2 08/03/2018 32     Anion Gap 08/03/2018 6     BUN 08/03/2018 13     Creatinine 08/03/2018 0 92     Glucose 08/03/2018 118     Calcium 08/03/2018 8 9     eGFR 08/03/2018 73      Imaging: Xr Chest 2 Views    Result Date: 8/1/2018  Narrative: CHEST INDICATION:   leg swelling, sob  COMPARISON:  None EXAM PERFORMED/VIEWS:  XR CHEST PA & LATERAL FINDINGS: Cardiomediastinal silhouette appears unremarkable  Mild vascular congestion  No pneumothorax or pleural effusion  Osseous structures appear within normal limits for patient age  Surgical clips throughout the upper abdomen  Impression: Mild vascular congestion  Workstation performed: [id number]       Review of Systems:  Review of Systems   Constitutional: Negative for diaphoresis and fatigue  HENT: Negative for congestion and facial swelling  Eyes: Negative for photophobia and visual disturbance  Respiratory: Positive for shortness of breath  Negative for chest tightness  Cardiovascular: Positive for leg swelling  Negative for chest pain and palpitations  Gastrointestinal: Negative for abdominal pain and nausea  Endocrine: Negative for cold intolerance and heat intolerance  Musculoskeletal: Negative for arthralgias and myalgias  Skin: Negative for pallor and rash  Neurological: Negative for dizziness and tremors  Psychiatric/Behavioral: Negative for sleep disturbance  The patient is not nervous/anxious  Physical Exam:  Physical Exam   Constitutional: She is oriented to person, place, and time  She appears well-developed and well-nourished  HENT:   Head: Normocephalic and atraumatic  Eyes: Conjunctivae and EOM are normal  Pupils are equal, round, and reactive to light  Neck: Normal range of motion  Neck supple  No JVD present  No thyromegaly present  Cardiovascular: Normal rate, regular rhythm, S1 normal, S2 normal and intact distal pulses  Exam reveals no gallop and no friction rub  Murmur heard  Crescendo decrescendo systolic murmur is present with a grade of 3/6   Pulses:       Carotid pulses are 2+ on the right side, and 2+ on the left side  Pulmonary/Chest: Effort normal and breath sounds normal  No respiratory distress  She has no wheezes  She has no rales  Abdominal: Soft  Bowel sounds are normal  She exhibits no distension  There is no tenderness  There is no rebound and no guarding  Musculoskeletal: Normal range of motion  Neurological: She is alert and oriented to person, place, and time  She has normal reflexes  No cranial nerve deficit  Skin: Skin is warm and dry  Psychiatric: She has a normal mood and affect  Discussion/Summary:  1  Acute on chronic systolic/diastolic CHF  Moderate to severe aortic stenosis  Patient continues to have dyspnea on exertion  Will schedule the patient for left and right heart catheterization to evaluate the severity of aortic stenosis and rule out significant coronary disease  Risks, benefits and alternatives explained to the patient informed consent was obtained    Continue Lasix for now    2  Hypertension, blood pressure well controlled    3    Hyperlipidemia on statins    Follow up with me after cardiac catheterization

## 2018-09-04 NOTE — INTERVAL H&P NOTE
Update: (This section must be completed if the H&P was completed greater than 24 hrs to procedure or admission)    H&P reviewed  After examining the patient, I find no changed to the H&P since it had been written  Patient re-evaluated   Accept as history and physical     Saad Juarez MD/September 4, 2018/8:32 AM

## 2018-09-06 ENCOUNTER — OFFICE VISIT (OUTPATIENT)
Dept: CARDIAC SURGERY | Facility: CLINIC | Age: 53
End: 2018-09-06
Payer: COMMERCIAL

## 2018-09-06 VITALS
HEIGHT: 64 IN | RESPIRATION RATE: 14 BRPM | SYSTOLIC BLOOD PRESSURE: 116 MMHG | OXYGEN SATURATION: 98 % | WEIGHT: 173 LBS | HEART RATE: 92 BPM | TEMPERATURE: 99.2 F | BODY MASS INDEX: 29.53 KG/M2 | DIASTOLIC BLOOD PRESSURE: 64 MMHG

## 2018-09-06 DIAGNOSIS — I35.0 NONRHEUMATIC AORTIC VALVE STENOSIS: Primary | ICD-10-CM

## 2018-09-06 PROCEDURE — 99245 OFF/OP CONSLTJ NEW/EST HI 55: CPT | Performed by: THORACIC SURGERY (CARDIOTHORACIC VASCULAR SURGERY)

## 2018-09-06 RX ORDER — DIPHENOXYLATE HYDROCHLORIDE AND ATROPINE SULFATE 2.5; .025 MG/1; MG/1
1 TABLET ORAL DAILY
COMMUNITY
End: 2018-10-19 | Stop reason: HOSPADM

## 2018-09-06 NOTE — PROGRESS NOTES
Consultation - Cardiothoracic Surgery   Edvin Leone 50 y o  female MRN: [id number]    Physician Requesting Consult: Dr León Buitrago    Reason for Consult / Principal Problem: Aortic stenosis, Non-Rheumatic    History of Present Illness: Edvin Leone is a 50y o  year old female with PMH of Hodgkin's Lymphoma with chemotherapy and radiation to the chest wall at age 7, history of pericardial effusion s/p pericardial window in 2011 at [facility] with post-operative complications necessitating an increased ICU length of stay, chronic diastolic heart failure, HTN, HLD, and anxiety  She was referred to our practice to discuss her treatment options for her known aortic stenosis that was recently found to be worsening after a hospitalization in August 2018 for acute on chronic CHF exacerbation  She had presented with complaints of SOB and LE edema  BNP >1300, CXR showed vascular congestion, EKG (-) for ischemic changes, and troponin (-)  Symptoms resolved after aggressive diuresis  TTE showed moderate-severe aortic stenosis with EF of 50-55%  Patient has followed with cardiologists since discovery and treatment of her pericardial effusion in 2011, seeing them every 6 months with echo  Aortic stenosis had remained mild-moderate during that time  Valvular function has markedly decreased in one years time, since her move from [facility] and subsequent lapse in cardiac care  Patient has no significant family history for heart disease  She ambulates without assistive device and performs ADLs independently  She does not see a dentist regularly      Past Medical History:  Past Medical History:   Diagnosis Date    Anxiety     Cancer ([address] )     [facility] [address] [facility]     Hodgkin's disease [facility]     Hyperlipidemia     Hypertension          Past Surgical History:   Past Surgical History:   Procedure Laterality Date    CARDIAC SURGERY      COLON SURGERY      non cancerous tumor    HYSTERECTOMY      NECK SURGERY      TUMOR REMOVAL           Family History:  Family History   Problem Relation Age of Onset    Hyperlipidemia Mother     Hypertension Mother     Diabetes Mother          Social History:    History   Alcohol Use No     History   Drug Use No     History   Smoking Status    Never Smoker   Smokeless Tobacco    Never Used       Home Medications:   Prior to Admission medications    Medication Sig Start Date End Date Taking?  Authorizing Provider   amLODIPine (NORVASC) 10 mg tablet Take 10 mg by mouth daily   6/12/18  Yes Historical Provider, MD   aspirin (ECOTRIN LOW STRENGTH) 81 mg EC tablet Take 81 mg by mouth daily   Yes Historical Provider, MD   atorvastatin (LIPITOR) 80 mg tablet Take 80 mg by mouth daily   6/12/18  Yes Historical Provider, MD   citalopram (CeleXA) 40 mg tablet Take 40 mg by mouth daily   6/12/18  Yes Historical Provider, MD   multivitamin (THERAGRAN) TABS Take 1 tablet by mouth daily   Yes Historical Provider, MD   pantoprazole (PROTONIX) 40 mg tablet Take 40 mg by mouth daily   6/12/18  Yes Historical Provider, MD   furosemide (LASIX) 40 mg tablet Take 1 tablet (40 mg total) by mouth daily for 30 days 8/3/18 9/4/18  Elma Jordan, DO       Allergies:  No Known Allergies    Review of Systems:  Review of Systems - History obtained from the patient  General ROS: positive for  - fatigue  negative for - chills or fever  Psychological ROS: positive for - anxiety  negative for - disorientation or hallucinations  ENT ROS: negative for - epistaxis or headaches  Hematological and Lymphatic ROS: negative for - bleeding problems or blood clots  Endocrine ROS: negative for - polydipsia/polyuria or skin changes  Respiratory ROS: positive for - shortness of breath  negative for - cough, hemoptysis or orthopnea  Cardiovascular ROS: positive for - dyspnea on exertion, edema and murmur  negative for - chest pain, irregular heartbeat or loss of consciousness  Gastrointestinal ROS: no abdominal pain, change in bowel habits, or black or bloody stools  Genito-Urinary ROS: no dysuria, trouble voiding, or hematuria  Musculoskeletal ROS: negative  Neurological ROS: no TIA or stroke symptoms  Dermatological ROS: negative    Vital Signs:     Vitals:    09/06/18 1200 09/06/18 1249   BP: 118/68 116/64   BP Location: Left arm Right arm   Cuff Size: Adult    Pulse: 92    Resp: 14    Temp: 99 2 °F (37 3 °C)    TempSrc: Oral    SpO2: 98%    Weight: 78 5 kg (173 lb)    Height: 5' 4" (1 626 m)        Physical Exam:    General: AAOx3, NAD, WD/WN  HEENT/NECK:  PERRLA  No jugular venous distention  Cardiac:Regular rate and rhythm, IV systolic ejection Murmur  Carotid arteries: murmur heard bilaterally, brisk upstroke  Pulmonary:  Breath sounds clear bilaterally  Abdomen:  Non-tender, Non-distended  Positive bowel sounds  Upper extremities: 2+ radial pulses; brisk capillary refill; Lower extremities: Extremities warm/dry  PT/DP pulses 2+ bilaterally  Trace edema B/L  Neuro: Alert and oriented X 3  Sensation is grossly intact  No focal deficits  Musculoskeletal: No gross abnormalities  Skin: Warm/Dry, without rashes or lesions  Imaging Studies:     Cardiac Catheterization: No significant CAD    Echocardiogram:   SUMMARY     PROCEDURE INFORMATION:  This was a technically difficult study  Echocardiographic views were limited due to poor acoustic window availability, decreased penetration, and lung interference      LEFT VENTRICLE:  Systolic function was at the lower limits of normal  Ejection fraction was estimated in the range of 50 % to 55 %  There were no regional wall motion abnormalities  Left ventricular diastolic function parameters were normal      RIGHT VENTRICLE:  The size was normal   Systolic function was normal      MITRAL VALVE:  There was moderate annular calcification  There was mild stenosis  There was trace regurgitation      AORTIC VALVE:  The valve was trileaflet   Leaflets exhibited increased thickness and calcification with reduced cuspal separation  Transaortic velocity was increased due to valvular stenosis  There was moderate to severe stenosis  Peak and mean AV gradients were 50 and 31 mm Hg respectively  GIL by the continuity equation method was 0 6 sq cm  There was mild regurgitation      TRICUSPID VALVE:  There was trace regurgitation  Pulmonary artery systolic pressure was at the upper limits of normal      PULMONIC VALVE:  There was mild to moderate regurgitation  I have personally reviewed pertinent films in PACS    Assessment:  Patient Active Problem List    Diagnosis Date Noted    Moderate to severe aortic stenosis 08/03/2018    [address]F [facility]estive [facility]) [facility] [address] ) 08/01/2018    Hypertension 08/01/2018     Severe aortic stenosis; Ongoing AVR workup    Plan:  Risks and benefits of aortic valve replacement were discussed in detail today with the patient  They understand and wish to proceed with further workup and ultimately surgical intervention  We have ordered routine preoperative laboratory and vascular studies, such as CTA TAVR protocol and carotid ultrasound  Pending the results of these tests, they will be scheduled for surgery  with Dr Marylen Glad, M D Laurence Arab was comfortable with our recommendations, and their questions were answered to their satisfaction  Thank you for allowing us to participate in the care of this patient       SIGNATURE: Ravin Walton PA-C  DATE: September 6, 2018  TIME: 1:36 PM

## 2018-09-11 ENCOUNTER — HOSPITAL ENCOUNTER (OUTPATIENT)
Dept: CT IMAGING | Facility: HOSPITAL | Age: 53
Discharge: HOME | End: 2018-09-11
Payer: COMMERCIAL

## 2018-09-11 ENCOUNTER — HOSPITAL ENCOUNTER (OUTPATIENT)
Dept: ULTRASOUND IMAGING | Facility: HOSPITAL | Age: 53
Discharge: HOME | End: 2018-09-11
Payer: COMMERCIAL

## 2018-09-11 DIAGNOSIS — I35.0 NONRHEUMATIC AORTIC VALVE STENOSIS: ICD-10-CM

## 2018-09-11 PROCEDURE — 75572 CT HRT W/3D IMAGE: CPT

## 2018-09-11 PROCEDURE — 74174 CTA ABD&PLVS W/CONTRAST: CPT

## 2018-09-11 PROCEDURE — 93880 EXTRACRANIAL BILAT STUDY: CPT | Performed by: SURGERY

## 2018-09-11 PROCEDURE — 93880 EXTRACRANIAL BILAT STUDY: CPT

## 2018-09-11 RX ADMIN — IODIXANOL 120 ML: 320 INJECTION, SOLUTION INTRAVASCULAR at 11:13

## 2018-09-20 ENCOUNTER — OFFICE VISIT (OUTPATIENT)
Dept: CARDIAC SURGERY | Facility: CLINIC | Age: 53
End: 2018-09-20
Payer: COMMERCIAL

## 2018-09-20 VITALS
DIASTOLIC BLOOD PRESSURE: 62 MMHG | BODY MASS INDEX: 30.22 KG/M2 | HEART RATE: 80 BPM | RESPIRATION RATE: 14 BRPM | HEIGHT: 64 IN | SYSTOLIC BLOOD PRESSURE: 140 MMHG | TEMPERATURE: 98.2 F | OXYGEN SATURATION: 93 % | WEIGHT: 177 LBS

## 2018-09-20 DIAGNOSIS — I35.0 AORTIC STENOSIS, SEVERE: Primary | ICD-10-CM

## 2018-09-20 PROCEDURE — 99215 OFFICE O/P EST HI 40 MIN: CPT | Performed by: PHYSICIAN ASSISTANT

## 2018-09-20 RX ORDER — CHLORHEXIDINE GLUCONATE 0.12 MG/ML
15 RINSE ORAL ONCE
Status: CANCELLED | OUTPATIENT
Start: 2018-09-20 | End: 2018-09-20

## 2018-09-20 NOTE — H&P
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, making her not a suitable candidate for surgical AVR  Mau Bee now presents to review the results of these tests and obtain a second surgeon to confirm the suitability of proceeding with transcatheter aortic valve replacment  The patient reports feeling well since her last visit  She states she used to have a stabbing chest pain, but that has been alleviated since she had her cardiac cath performed  She denies shortness of breath, LE edema, syncope, palpitations, PND or orthopnea  She is scheduled to have a visit with her dentist on October 9th  She has a past medical history of Hodgkin's Lymphoma with chemotherapy and radiation to the chest wall at age 7, history of pericardial effusion s/p pericardial window in 2011 at [facility] with post-operative complications necessitating an increased ICU length of stay, chronic diastolic heart failure, HTN, HLD, and anxiety  She was referred to our practice to discuss her treatment options for her known aortic stenosis that was recently found to be worsening after a hospitalization in August 2018 for acute on chronic CHF exacerbation  She had presented with complaints of SOB and LE edema  BNP >1300, CXR showed vascular congestion, EKG (-) for ischemic changes, and troponin (-)  Symptoms resolved after aggressive diuresis   TTE showed moderate-severe aortic stenosis with EF of 50-55%  Patient has followed with cardiologists since discovery and treatment of her pericardial effusion in 2011, seeing them every 6 months with echo  Aortic stenosis had remained mild-moderate during that time  Valvular function has markedly decreased in one years time, since her move from [facility] and subsequent lapse in cardiac care  Patient has no significant family history for heart disease  She ambulates without assistive device and performs ADLs independently  She does not see a dentist regularly  Past Medical History:  Past Medical History:   Diagnosis Date    Anxiety     [facility]     [facility]     Hodgkin's disease ([facility] )     Hyperlipidemia     Hypertension          Past Surgical History:   Past Surgical History:   Procedure Laterality Date    CARDIAC SURGERY      COLON SURGERY      non cancerous tumor    HYSTERECTOMY      NECK SURGERY      TUMOR REMOVAL           Family History:  Family History   Problem Relation Age of Onset    Hyperlipidemia Mother     Hypertension Mother     Diabetes Mother          Social History:    History   Alcohol Use No     History   Drug Use No     History   Smoking Status    Never Smoker   Smokeless Tobacco    Never Used       Home Medications:   Prior to Admission medications    Medication Sig Start Date End Date Taking?  Authorizing Provider   amLODIPine (NORVASC) 10 mg tablet Take 10 mg by mouth daily   6/12/18  Yes Historical Provider, MD   aspirin (ECOTRIN LOW STRENGTH) 81 mg EC tablet Take 81 mg by mouth daily   Yes Historical Provider, MD   atorvastatin (LIPITOR) 80 mg tablet Take 80 mg by mouth daily   6/12/18  Yes Historical Provider, MD   citalopram (CeleXA) 40 mg tablet Take 40 mg by mouth daily   6/12/18  Yes Historical Provider, MD   multivitamin (THERAGRAN) TABS Take 1 tablet by mouth daily   Yes Historical Provider, MD   pantoprazole (PROTONIX) 40 mg tablet Take 40 mg by mouth daily   6/12/18  Yes Historical Provider, MD   furosemide (LASIX) 40 mg tablet Take 1 tablet (40 mg total) by mouth daily for 30 days 8/3/18 9/4/18  DO kristofer Rodriguez [facility]) 2 % ointment Apply topically 2 (two) times a day Apply inside both nostrils two times per day  Start 5 days prior to surgery  9/20/18   Frankie Roberts PA-C       Allergies:  No Known Allergies    Review of Systems:  Review of Systems    Vital Signs:     Vitals:    09/20/18 1100 09/20/18 1119   BP: 136/78 140/62   BP Location: Left arm Right arm   Cuff Size: Adult    Pulse: 80    Resp: 14    Temp: 98 2 °F (36 8 °C)    TempSrc: Oral    SpO2: 93%    Weight: 80 3 kg (177 lb)    Height: 5' 4" (1 626 m)        Physical Exam:  Physical Exam    Lab Results:                 No results found for: HGBA1C  Lab Results   Component Value Date    TROPONINI 0 02 08/02/2018       Imaging Studies:     Echocardiogram: LEFT VENTRICLE:  Systolic function was at the lower limits of normal  Ejection fraction was estimated in the range of 50 % to 55 %  There were no regional wall motion abnormalities  Left ventricular diastolic function parameters were normal      RIGHT VENTRICLE:  The size was normal   Systolic function was normal      MITRAL VALVE:  There was moderate annular calcification  There was mild stenosis  There was trace regurgitation      AORTIC VALVE:  The valve was trileaflet  Leaflets exhibited increased thickness and calcification with reduced cuspal separation  Transaortic velocity was increased due to valvular stenosis  There was moderate to severe stenosis  Peak and mean AV gradients were 50 and 31 mm Hg respectively  GIL by the continuity equation method was 0 6 sq cm  There was mild regurgitation      TRICUSPID VALVE:  There was trace regurgitation  Pulmonary artery systolic pressure was at the upper limits of normal      PULMONIC VALVE:  There was mild to moderate regurgitation      Gated CTA of the chest/abdomen/pelvis: 1  TAVR measurements:    Annulus: diameter 25 7 x 16 8 mm      area: 329 2 sq mm    Annulus to LCA: 10 4 mm    Annulus to RCA: 16 2 mm    Minimal diameter right iliofemoral segment: 6 5 mm    Minimal diameter left iliofemoral segment: 6 7 mm  2  No significant aortoiliac occlusive disease  3  Mild prominence of the common duct and central intrahepatic ducts without obstructive mass identified  May be normal cholecystectomy appearance  4   Air-fluid level seen throughout the esophagus which may be indicative of motility abnormality  5   There are 3 mm noncalcified nodules in the right upper lobe and left upper lobe Based on current Fleischner Society 2017 Guidelines on incidental pulmonary nodule, no routine follow-up is needed if the patient is considered low risk for lung cancer  If the patient is considered high risk for lung cancer, 12 month follow-up non-contrast chest CT is recommended  Left and right cardiac catheterization: CORONARY CIRCULATION:  Left main: The vessel was normal sized  Angiography showed mild atherosclerosis  LAD: The vessel was normal sized  Angiography showed mild atherosclerosis  1st diagonal: The vessel was normal sized  Angiography showed mild atherosclerosis  Circumflex: The vessel was normal sized  Angiography showed mild atherosclerosis  Ramus intermedius: The vessel was normal sized  Angiography showed mild atherosclerosis  RCA: The vessel was large sized (dominant)  Angiography showed mild atherosclerosis  Right PDA: The vessel was normal sized  Angiography showed mild atherosclerosis  Right posterolateral segment: The vessel was normal sized  Angiography showed minor luminal irregularities      CARDIAC STRUCTURES:  There was severe aortic stenosis  Carotid artery ultrasound: RIGHT:  There is <50% stenosis noted in the internal carotid artery  Plaque is  heterogenous and irregular  Vertebral artery flow is antegrade   There is no significant subclavian artery  disease  LEFT:  There is <50% stenosis noted in the internal carotid artery  Plaque is  heterogenous and irregular  Vertebral artery flow is antegrade  There is no significant subclavian artery  disease  I have personally reviewed pertinent films in PACS    TAVR evaluation Assessment:     5 Meter Walk Test:      Attempt 1: 4   Attempt 2: 4   Attempt 3: 4    STS Risk Score:     Risk of mortality 2 1 %    NYHC: II    KCCQ-12 was completed    Assessment:  Patient Active Problem List    Diagnosis Date Noted    Moderate to severe aortic stenosis 08/03/2018    [facility] 08/01/2018    Hypertension 08/01/2018     Severe aortic stenosis; Ongoing TAVR workup    Plan:    Mau Bee has severe symptomatic aortic stenosis  Based on their STS risk assessment, they have undergone testing for transcatheter aortic valve replacement  The results of these studies have been interpreted by two independent cardiac surgeons who have determined the patient to be Prohibitive risk for open aortic valve replacement due to a porcelain aorta and pulmonary veins  The risks, benefits, and alternatives to TAVR were discussed in detail with the patient today  They understand and wish to proceed with transfemoral transcatheter aortic valve replacement  She will complete preoperative PFTs, ABG and laboratory studies  Informed consent was obtained and a date for the intervention has been set for October 16th       Mau Bee was comfortable with our recommendations, and their questions were answered to their satisfaction  The following preoperative instructions were provided at the conclusion of their consultation:     1  You will receive a phone call from the hospital between 2:00 PM and 8:00 PM the day prior to surgery to confirm arrival time and location  For surgery on Mondays, you will receive a call on Friday  2   Do not drink or eat anything after midnight the night before surgery  That includes no water, candy, gum, lozenges, Lifesavers, etc  We recommend you not eat any "junk" food, consume alcohol or smoke the night before surgery  3  Continue taking aspirin but only 81 mg daily  4  If you take Coumadin and/or Plavix, discontinue it 5 days before surgery  5  If you are diabetic, do not take any of your diabetic pills the morning of surgery  If you take Lantus insulin, you may take it at your regularly scheduled time the day before surgery  Do not take any other insulins the morning of surgery  6  The 2 nights before surgery, take a shower each night using the special antiseptic soap or soap sponges you received from the office or hospital  Tara Few your hair with regular shampoo and rinse completely before using the antiseptic sponges  Use the sponge to wash from your neck down, with special attention to the armpits and groin area  Do not use any other soap or cleanser on your skin  Do not use lotions, powder, deodorant or perfume of any kind on your skin after you shower  Use clean bed linens and wear clean pajamas after your shower  7  You will be prescribed Mupirocin nasal ointment  Apply to both nostrils twice a day for 5 days prior to surgery  8  Do not take a shower the morning of her surgery; you'll be given a special" bath" at the hospital   9  Notify the CT surgery office if you develop a cold, sore throat, cough, fever or other health issues before your surgery    10  Other medication changes included the following: Stop Multivitamin now     SIGNATURE: Kelly Wallace PA-C  DATE: September 20, 2018  TIME: 12:00 PM

## 2018-09-20 NOTE — PROGRESS NOTES
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, making her not a suitable candidate for surgical AVR  Jared Bowden now presents to review the results of these tests and obtain a second surgeon to confirm the suitability of proceeding with transcatheter aortic valve replacment  The patient reports feeling well since her last visit  She states she used to have a stabbing chest pain, but that has been alleviated since she had her cardiac cath performed  She denies shortness of breath, LE edema, syncope, palpitations, PND or orthopnea  She is scheduled to have a visit with her dentist on October 9th  She has a past medical history of Hodgkin's Lymphoma with chemotherapy and radiation to the chest wall at age 7, history of pericardial effusion s/p pericardial window in 2011 at [facility] with post-operative complications necessitating an increased ICU length of stay, chronic diastolic heart failure, HTN, HLD, and anxiety  She was referred to our practice to discuss her treatment options for her known aortic stenosis that was recently found to be worsening after a hospitalization in August 2018 for acute on chronic CHF exacerbation  She had presented with complaints of SOB and LE edema  BNP >1300, CXR showed vascular congestion, EKG (-) for ischemic changes, and troponin (-)  Symptoms resolved after aggressive diuresis   TTE showed moderate-severe aortic stenosis with EF of 50-55%  Patient has followed with cardiologists since discovery and treatment of her pericardial effusion in 2011, seeing them every 6 months with echo  Aortic stenosis had remained mild-moderate during that time  Valvular function has markedly decreased in one years time, since her move from [facility] and subsequent lapse in cardiac care  Patient has no significant family history for heart disease  She ambulates without assistive device and performs ADLs independently  She does not see a dentist regularly  Past Medical History:  Past Medical History:   Diagnosis Date    Anxiety     [facility]     [facility] )     Hodgkin's disease ([facility] )     Hyperlipidemia     Hypertension          Past Surgical History:   Past Surgical History:   Procedure Laterality Date    CARDIAC SURGERY      COLON SURGERY      non cancerous tumor    HYSTERECTOMY      NECK SURGERY      TUMOR REMOVAL           Family History:  Family History   Problem Relation Age of Onset    Hyperlipidemia Mother     Hypertension Mother     Diabetes Mother          Social History:    History   Alcohol Use No     History   Drug Use No     History   Smoking Status    Never Smoker   Smokeless Tobacco    Never Used       Home Medications:   Prior to Admission medications    Medication Sig Start Date End Date Taking?  Authorizing Provider   amLODIPine (NORVASC) 10 mg tablet Take 10 mg by mouth daily   6/12/18  Yes Historical Provider, MD   aspirin (ECOTRIN LOW STRENGTH) 81 mg EC tablet Take 81 mg by mouth daily   Yes Historical Provider, MD   atorvastatin (LIPITOR) 80 mg tablet Take 80 mg by mouth daily   6/12/18  Yes Historical Provider, MD   citalopram (CeleXA) 40 mg tablet Take 40 mg by mouth daily   6/12/18  Yes Historical Provider, MD   multivitamin (THERAGRAN) TABS Take 1 tablet by mouth daily   Yes Historical Provider, MD   pantoprazole (PROTONIX) 40 mg tablet Take 40 mg by mouth daily   6/12/18  Yes Historical Provider, MD   furosemide (LASIX) 40 mg tablet Take 1 tablet (40 mg total) by mouth daily for 30 days 8/3/18 9/4/18  DO kristofer Landaverde [facility]) 2 % ointment Apply topically 2 (two) times a day Apply inside both nostrils two times per day  Start 5 days prior to surgery  9/20/18   Pat Calderon PA-C       Allergies:  No Known Allergies    Review of Systems:  Review of Systems   Constitutional: Negative for activity change, appetite change, chills, diaphoresis, fatigue, fever and unexpected weight change  HENT: Negative for congestion, dental problem, facial swelling, hearing loss, nosebleeds, rhinorrhea, sinus pain, sinus pressure, sore throat and trouble swallowing  Eyes: Negative  Respiratory: Negative for cough, choking, chest tightness, shortness of breath and wheezing  Cardiovascular: Negative for chest pain, palpitations and leg swelling  Gastrointestinal: Negative for abdominal distention, abdominal pain, blood in stool, constipation, diarrhea and nausea  Endocrine: Negative  Genitourinary: Negative for difficulty urinating, dysuria, frequency and urgency  Musculoskeletal: Negative  Allergic/Immunologic: Negative  Neurological: Negative for dizziness, seizures, syncope, weakness, light-headedness, numbness and headaches  Hematological: Negative for adenopathy  Does not bruise/bleed easily  Psychiatric/Behavioral: Negative  All other systems reviewed and are negative  Vital Signs:     Vitals:    09/20/18 1100 09/20/18 1119   BP: 136/78 140/62   BP Location: Left arm Right arm   Cuff Size: Adult    Pulse: 80    Resp: 14    Temp: 98 2 °F (36 8 °C)    TempSrc: Oral    SpO2: 93%    Weight: 80 3 kg (177 lb)    Height: 5' 4" (1 626 m)        Physical Exam:  Physical Exam   Constitutional: She is oriented to person, place, and time  She appears well-developed and well-nourished  No distress     HENT:   Head: Normocephalic and atraumatic  Right Ear: External ear normal    Left Ear: External ear normal    Mouth/Throat: Oropharynx is clear and moist  No oropharyngeal exudate  Eyes: Conjunctivae and EOM are normal  Pupils are equal, round, and reactive to light  Left eye exhibits no discharge  Neck: Normal range of motion  Neck supple  No JVD present  No tracheal deviation present  Cardiovascular: Normal rate, regular rhythm and intact distal pulses  Murmur heard  Systolic murmur is present with a grade of 4/6   Pulmonary/Chest: Effort normal and breath sounds normal  No respiratory distress  She has no wheezes  She has no rales  Abdominal: Soft  Bowel sounds are normal  She exhibits no distension  There is no tenderness  There is no rebound and no guarding  Musculoskeletal: Normal range of motion  She exhibits no edema, tenderness or deformity  Neurological: She is alert and oriented to person, place, and time  She displays normal reflexes  No cranial nerve deficit  She exhibits normal muscle tone  Coordination normal    Skin: Skin is warm and dry  No rash noted  She is not diaphoretic  No erythema  No pallor  Psychiatric: She has a normal mood and affect  Her behavior is normal  Judgment and thought content normal    Nursing note and vitals reviewed  Lab Results:                 No results found for: HGBA1C  Lab Results   Component Value Date    TROPONINI 0 02 08/02/2018       Imaging Studies:     Echocardiogram: LEFT VENTRICLE:  Systolic function was at the lower limits of normal  Ejection fraction was estimated in the range of 50 % to 55 %  There were no regional wall motion abnormalities  Left ventricular diastolic function parameters were normal      RIGHT VENTRICLE:  The size was normal   Systolic function was normal      MITRAL VALVE:  There was moderate annular calcification  There was mild stenosis  There was trace regurgitation      AORTIC VALVE:  The valve was trileaflet   Leaflets exhibited increased thickness and calcification with reduced cuspal separation  Transaortic velocity was increased due to valvular stenosis  There was moderate to severe stenosis  Peak and mean AV gradients were 50 and 31 mm Hg respectively  GIL by the continuity equation method was 0 6 sq cm  There was mild regurgitation      TRICUSPID VALVE:  There was trace regurgitation  Pulmonary artery systolic pressure was at the upper limits of normal      PULMONIC VALVE:  There was mild to moderate regurgitation  Gated CTA of the chest/abdomen/pelvis: 1  TAVR measurements:    Annulus: diameter 25 7 x 16 8 mm      area: 329 2 sq mm    Annulus to LCA: 10 4 mm    Annulus to RCA: 16 2 mm    Minimal diameter right iliofemoral segment: 6 5 mm    Minimal diameter left iliofemoral segment: 6 7 mm  2  No significant aortoiliac occlusive disease  3  Mild prominence of the common duct and central intrahepatic ducts without obstructive mass identified  May be normal cholecystectomy appearance  4   Air-fluid level seen throughout the esophagus which may be indicative of motility abnormality  5   There are 3 mm noncalcified nodules in the right upper lobe and left upper lobe Based on current Fleischner Society 2017 Guidelines on incidental pulmonary nodule, no routine follow-up is needed if the patient is considered low risk for lung cancer  If the patient is considered high risk for lung cancer, 12 month follow-up non-contrast chest CT is recommended  Left and right cardiac catheterization: CORONARY CIRCULATION:  Left main: The vessel was normal sized  Angiography showed mild atherosclerosis  LAD: The vessel was normal sized  Angiography showed mild atherosclerosis  1st diagonal: The vessel was normal sized  Angiography showed mild atherosclerosis  Circumflex: The vessel was normal sized  Angiography showed mild atherosclerosis  Ramus intermedius: The vessel was normal sized   Angiography showed mild atherosclerosis  RCA: The vessel was large sized (dominant)  Angiography showed mild atherosclerosis  Right PDA: The vessel was normal sized  Angiography showed mild atherosclerosis  Right posterolateral segment: The vessel was normal sized  Angiography showed minor luminal irregularities      CARDIAC STRUCTURES:  There was severe aortic stenosis  Carotid artery ultrasound: RIGHT:  There is <50% stenosis noted in the internal carotid artery  Plaque is  heterogenous and irregular  Vertebral artery flow is antegrade  There is no significant subclavian artery  disease  LEFT:  There is <50% stenosis noted in the internal carotid artery  Plaque is  heterogenous and irregular  Vertebral artery flow is antegrade  There is no significant subclavian artery  disease  I have personally reviewed pertinent films in PACS    TAVR evaluation Assessment:     5 Meter Walk Test:      Attempt 1: 4   Attempt 2: 4   Attempt 3: 4    STS Risk Score:     Risk of mortality 2 1 %    [place]: II    KCCQ-12 was completed    Assessment:  Patient Active Problem List    Diagnosis Date Noted    Moderate to severe aortic stenosis 08/03/2018    [facility]estive [facility]) ([facility] [facility] 08/01/2018    Hypertension 08/01/2018     Severe aortic stenosis; Ongoing TAVR workup    Plan:    Chloe Zamudio has severe symptomatic aortic stenosis  Based on their STS risk assessment, they have undergone testing for transcatheter aortic valve replacement  The results of these studies have been interpreted by two independent cardiac surgeons who have determined the patient to be Prohibitive risk for open aortic valve replacement due to a porcelain aorta and pulmonary veins  The risks, benefits, and alternatives to TAVR were discussed in detail with the patient today  They understand and wish to proceed with transfemoral transcatheter aortic valve replacement  She will complete preoperative PFTs, ABG and laboratory studies    Informed consent was obtained and a date for the intervention has been set for October 16th       Carly Palomares was comfortable with our recommendations, and their questions were answered to their satisfaction  The following preoperative instructions were provided at the conclusion of their consultation:     1  You will receive a phone call from the hospital between 2:00 PM and 8:00 PM the day prior to surgery to confirm arrival time and location  For surgery on Mondays, you will receive a call on Friday  2  Do not drink or eat anything after midnight the night before surgery  That includes no water, candy, gum, lozenges, Lifesavers, etc  We recommend you not eat any "junk" food, consume alcohol or smoke the night before surgery  3  Continue taking aspirin but only 81 mg daily  4  If you take Coumadin and/or Plavix, discontinue it 5 days before surgery  5  If you are diabetic, do not take any of your diabetic pills the morning of surgery  If you take Lantus insulin, you may take it at your regularly scheduled time the day before surgery  Do not take any other insulins the morning of surgery  6  The 2 nights before surgery, take a shower each night using the special antiseptic soap or soap sponges you received from the office or hospital  Cathie Fast your hair with regular shampoo and rinse completely before using the antiseptic sponges  Use the sponge to wash from your neck down, with special attention to the armpits and groin area  Do not use any other soap or cleanser on your skin  Do not use lotions, powder, deodorant or perfume of any kind on your skin after you shower  Use clean bed linens and wear clean pajamas after your shower  7  You will be prescribed Mupirocin nasal ointment  Apply to both nostrils twice a day for 5 days prior to surgery    8  Do not take a shower the morning of her surgery; you'll be given a special" bath" at the hospital   9  Notify the CT surgery office if you develop a cold, sore throat, cough, fever or other health issues before your surgery    10  Other medication changes included the following: Stop Multivitamin now     SIGNATURE: Hank Martinez PA-C  DATE: September 20, 2018  TIME: 12:00 PM

## 2018-09-20 NOTE — LETTER
September 20, 2018     [facility] [address]    Patient: Deuce Scott   YOB: 1965   Date of Visit: 9/20/2018       Dear Dr Isaac Bobo:    Thank you for referring Deuce Scott to me for evaluation  Below are my notes for this consultation  If you have questions, please do not hesitate to call me  I look forward to following your patient along with you  Sincerely,        Kathryn Culver,         CC: MD Maximino Corona PA-C  9/20/2018 12:16 PM  Attested  Consultation - Cardiothoracic Surgery   Deuce Scott 48 y o  female MRN: [id number]      Reason for Consult / Principal Problem: Aortic stenosis, Non-Rheumatic    History of Present Illness: Deuce Scott is a 48y o  year old female who was previously evaluated in our office by TOMI De Leon  For aortic valve replacement  During this initial consultation, arrangements were made for the following studies to be completed: Gated CTA of the chest/abdomen/pelvis, 3D MICHAEL, left and right cardiac catheterization, and carotid artery ultrasound  Her 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, making her not a suitable candidate for surgical AVR  Deuce Scott now presents to review the results of these tests and obtain a second surgeon to confirm the suitability of proceeding with transcatheter aortic valve replacment  The patient reports feeling well since her last visit  She states she used to have a stabbing chest pain, but that has been alleviated since she had her cardiac cath performed  She denies shortness of breath, LE edema, syncope, palpitations, PND or orthopnea  She is scheduled to have a visit with her dentist on October 9th      She has a past medical history of Hodgkin's Lymphoma with chemotherapy and radiation to the chest wall at age 7, history of pericardial effusion s/p pericardial window in 2011 at [facility] with post-operative complications necessitating an increased ICU length of stay, chronic diastolic heart failure, HTN, HLD, and anxiety  She was referred to our practice to discuss her treatment options for her known aortic stenosis that was recently found to be worsening after a hospitalization in August 2018 for acute on chronic CHF exacerbation  She had presented with complaints of SOB and LE edema  BNP >1300, CXR showed vascular congestion, EKG (-) for ischemic changes, and troponin (-)  Symptoms resolved after aggressive diuresis  TTE showed moderate-severe aortic stenosis with EF of 50-55%  Patient has followed with cardiologists since discovery and treatment of her pericardial effusion in 2011, seeing them every 6 months with echo  Aortic stenosis had remained mild-moderate during that time  Valvular function has markedly decreased in one years time, since her move from [facility] and subsequent lapse in cardiac care  Patient has no significant family history for heart disease  She ambulates without assistive device and performs ADLs independently  She does not see a dentist regularly  Past Medical History:  Past Medical History:   Diagnosis Date    Anxiety     [facility]     [facility]     [facility]     Hyperlipidemia     Hypertension          Past Surgical History:   Past Surgical History:   Procedure Laterality Date    CARDIAC SURGERY      COLON SURGERY      non cancerous tumor    HYSTERECTOMY      NECK SURGERY      TUMOR REMOVAL           Family History:  Family History   Problem Relation Age of Onset    Hyperlipidemia Mother     Hypertension Mother     Diabetes Mother          Social History:    History   Alcohol Use No     History   Drug Use No     History   Smoking Status    Never Smoker   Smokeless Tobacco    Never Used       Home Medications:   Prior to Admission medications    Medication Sig Start Date End Date Taking?  Authorizing Provider amLODIPine (NORVASC) 10 mg tablet Take 10 mg by mouth daily   6/12/18  Yes Historical Provider, MD   aspirin (ECOTRIN LOW STRENGTH) 81 mg EC tablet Take 81 mg by mouth daily   Yes Historical Provider, MD   atorvastatin (LIPITOR) 80 mg tablet Take 80 mg by mouth daily   6/12/18  Yes Historical Provider, MD   citalopram (CeleXA) 40 mg tablet Take 40 mg by mouth daily   6/12/18  Yes Historical Provider, MD   multivitamin (THERAGRAN) TABS Take 1 tablet by mouth daily   Yes Historical Provider, MD   pantoprazole (PROTONIX) 40 mg tablet Take 40 mg by mouth daily   6/12/18  Yes Historical Provider, MD   furosemide (LASIX) 40 mg tablet Take 1 tablet (40 mg total) by mouth daily for 30 days 8/3/18 9/4/18  Miracle Kathleen DO   mupirocin (BACTROBAN) 2 % ointment Apply topically 2 (two) times a day Apply inside both nostrils two times per day  Start 5 days prior to surgery  9/20/18   Rachel Kinsey PA-C       Allergies:  No Known Allergies    Review of Systems:  Review of Systems   Constitutional: Negative for activity change, appetite change, chills, diaphoresis, fatigue, fever and unexpected weight change  HENT: Negative for congestion, dental problem, facial swelling, hearing loss, nosebleeds, rhinorrhea, sinus pain, sinus pressure, sore throat and trouble swallowing  Eyes: Negative  Respiratory: Negative for cough, choking, chest tightness, shortness of breath and wheezing  Cardiovascular: Negative for chest pain, palpitations and leg swelling  Gastrointestinal: Negative for abdominal distention, abdominal pain, blood in stool, constipation, diarrhea and nausea  Endocrine: Negative  Genitourinary: Negative for difficulty urinating, dysuria, frequency and urgency  Musculoskeletal: Negative  Allergic/Immunologic: Negative  Neurological: Negative for dizziness, seizures, syncope, weakness, light-headedness, numbness and headaches  Hematological: Negative for adenopathy   Does not bruise/bleed easily  Psychiatric/Behavioral: Negative  All other systems reviewed and are negative  Vital Signs:     Vitals:    09/20/18 1100 09/20/18 1119   BP: 136/78 140/62   BP Location: Left arm Right arm   Cuff Size: Adult    Pulse: 80    Resp: 14    Temp: 98 2 °F (36 8 °C)    TempSrc: Oral    SpO2: 93%    Weight: 80 3 kg (177 lb)    Height: 5' 4" (1 626 m)        Physical Exam:  Physical Exam   Constitutional: She is oriented to person, place, and time  She appears well-developed and well-nourished  No distress  HENT:   Head: Normocephalic and atraumatic  Right Ear: External ear normal    Left Ear: External ear normal    Mouth/Throat: Oropharynx is clear and moist  No oropharyngeal exudate  Eyes: Conjunctivae and EOM are normal  Pupils are equal, round, and reactive to light  Left eye exhibits no discharge  Neck: Normal range of motion  Neck supple  No JVD present  No tracheal deviation present  Cardiovascular: Normal rate, regular rhythm and intact distal pulses  Murmur heard  Systolic murmur is present with a grade of 4/6   Pulmonary/Chest: Effort normal and breath sounds normal  No respiratory distress  She has no wheezes  She has no rales  Abdominal: Soft  Bowel sounds are normal  She exhibits no distension  There is no tenderness  There is no rebound and no guarding  Musculoskeletal: Normal range of motion  She exhibits no edema, tenderness or deformity  Neurological: She is alert and oriented to person, place, and time  She displays normal reflexes  No cranial nerve deficit  She exhibits normal muscle tone  Coordination normal    Skin: Skin is warm and dry  No rash noted  She is not diaphoretic  No erythema  No pallor  Psychiatric: She has a normal mood and affect  Her behavior is normal  Judgment and thought content normal    Nursing note and vitals reviewed        Lab Results:                 No results found for: HGBA1C  Lab Results   Component Value Date    TROPONINI 0 02 08/02/2018       Imaging Studies:     Echocardiogram: LEFT VENTRICLE:  Systolic function was at the lower limits of normal  Ejection fraction was estimated in the range of 50 % to 55 %  There were no regional wall motion abnormalities  Left ventricular diastolic function parameters were normal      RIGHT VENTRICLE:  The size was normal   Systolic function was normal      MITRAL VALVE:  There was moderate annular calcification  There was mild stenosis  There was trace regurgitation      AORTIC VALVE:  The valve was trileaflet  Leaflets exhibited increased thickness and calcification with reduced cuspal separation  Transaortic velocity was increased due to valvular stenosis  There was moderate to severe stenosis  Peak and mean AV gradients were 50 and 31 mm Hg respectively  GIL by the continuity equation method was 0 6 sq cm  There was mild regurgitation      TRICUSPID VALVE:  There was trace regurgitation  Pulmonary artery systolic pressure was at the upper limits of normal      PULMONIC VALVE:  There was mild to moderate regurgitation  Gated CTA of the chest/abdomen/pelvis: 1  TAVR measurements:    Annulus: diameter 25 7 x 16 8 mm      area: 329 2 sq mm    Annulus to LCA: 10 4 mm    Annulus to RCA: 16 2 mm    Minimal diameter right iliofemoral segment: 6 5 mm    Minimal diameter left iliofemoral segment: 6 7 mm  2  No significant aortoiliac occlusive disease  3  Mild prominence of the common duct and central intrahepatic ducts without obstructive mass identified  May be normal cholecystectomy appearance  4   Air-fluid level seen throughout the esophagus which may be indicative of motility abnormality  5   There are 3 mm noncalcified nodules in the right upper lobe and left upper lobe Based on current Fleischner Society 2017 Guidelines on incidental pulmonary nodule, no routine follow-up is needed if the patient is considered low risk for lung cancer      If the patient is considered high risk for lung cancer, 12 month follow-up non-contrast chest CT is recommended  Left and right cardiac catheterization: CORONARY CIRCULATION:  Left main: The vessel was normal sized  Angiography showed mild atherosclerosis  LAD: The vessel was normal sized  Angiography showed mild atherosclerosis  1st diagonal: The vessel was normal sized  Angiography showed mild atherosclerosis  Circumflex: The vessel was normal sized  Angiography showed mild atherosclerosis  Ramus intermedius: The vessel was normal sized  Angiography showed mild atherosclerosis  RCA: The vessel was large sized (dominant)  Angiography showed mild atherosclerosis  Right PDA: The vessel was normal sized  Angiography showed mild atherosclerosis  Right posterolateral segment: The vessel was normal sized  Angiography showed minor luminal irregularities      CARDIAC STRUCTURES:  There was severe aortic stenosis  Carotid artery ultrasound: RIGHT:  There is <50% stenosis noted in the internal carotid artery  Plaque is  heterogenous and irregular  Vertebral artery flow is antegrade  There is no significant subclavian artery  disease  LEFT:  There is <50% stenosis noted in the internal carotid artery  Plaque is  heterogenous and irregular  Vertebral artery flow is antegrade  There is no significant subclavian artery  disease  I have personally reviewed pertinent films in PACS    TAVR evaluation Assessment:     5 Meter Walk Test:      Attempt 1: 4   Attempt 2: 4   Attempt 3: 4    STS Risk Score:     Risk of mortality 2 1 %    NYHC: II    KCCQ-12 was completed    Assessment:  Patient Active Problem List    Diagnosis Date Noted    Moderate to severe aortic stenosis 08/03/2018    [facility] 08/01/2018    Hypertension 08/01/2018     Severe aortic stenosis; Ongoing TAVR workup    Plan:    Mikhail Severino has severe symptomatic aortic stenosis   Based on their STS risk assessment, they have undergone testing for transcatheter aortic valve replacement  The results of these studies have been interpreted by two independent cardiac surgeons who have determined the patient to be Prohibitive risk for open aortic valve replacement due to a porcelain aorta and pulmonary veins  The risks, benefits, and alternatives to TAVR were discussed in detail with the patient today  They understand and wish to proceed with transfemoral transcatheter aortic valve replacement  She will complete preoperative PFTs, ABG and laboratory studies  Informed consent was obtained and a date for the intervention has been set for October 16th       Sindy Leger was comfortable with our recommendations, and their questions were answered to their satisfaction  The following preoperative instructions were provided at the conclusion of their consultation:     1  You will receive a phone call from the hospital between 2:00 PM and 8:00 PM the day prior to surgery to confirm arrival time and location  For surgery on Mondays, you will receive a call on Friday  2  Do not drink or eat anything after midnight the night before surgery  That includes no water, candy, gum, lozenges, Lifesavers, etc  We recommend you not eat any "junk" food, consume alcohol or smoke the night before surgery  3  Continue taking aspirin but only 81 mg daily  4  If you take Coumadin and/or Plavix, discontinue it 5 days before surgery  5  If you are diabetic, do not take any of your diabetic pills the morning of surgery  If you take Lantus insulin, you may take it at your regularly scheduled time the day before surgery  Do not take any other insulins the morning of surgery  6  The 2 nights before surgery, take a shower each night using the special antiseptic soap or soap sponges you received from the office or hospital  Deboraha Colder your hair with regular shampoo and rinse completely before using the antiseptic sponges   Use the sponge to wash from your neck down, with special attention to the armpits and groin area  Do not use any other soap or cleanser on your skin  Do not use lotions, powder, deodorant or perfume of any kind on your skin after you shower  Use clean bed linens and wear clean pajamas after your shower  7  You will be prescribed Mupirocin nasal ointment  Apply to both nostrils twice a day for 5 days prior to surgery  8  Do not take a shower the morning of her surgery; you'll be given a special" bath" at the hospital   9  Notify the CT surgery office if you develop a cold, sore throat, cough, fever or other health issues before your surgery  10  Other medication changes included the following: Stop Multivitamin now     SIGNATURE: Emerson Smart PA-C  DATE: September 20, 2018  TIME: 12:00 PM  Attestation signed by Roderick Wilde DO at 9/20/2018  1:54 PM:  The patient was seen and examined, and I agree with the midlevel's history, physical exam, assessment and plan with the following additions:    Ms Rudi Mayers a 42-year-old woman who has severe symptomatic aortic stenosis  She has a history of receiving radiation in the past for treatment of Hodgkin's disease  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

## 2018-09-20 NOTE — PATIENT INSTRUCTIONS
1  You will receive a phone call from the hospital between 2:00 PM and 8:00 PM the day prior to surgery to confirm arrival time and location  For surgery on Mondays, you will receive a call on Friday  2  Do not drink or eat anything after midnight the night before surgery  That includes no water, candy, gum, lozenges, Lifesavers, etc  We recommend you not eat any "junk" food, consume alcohol or smoke the night before surgery  3  Continue taking aspirin but only 81 mg daily  4  If you take Coumadin and/or Plavix, discontinue it 5 days before surgery  5  If you are diabetic, do not take any of your diabetic pills the morning of surgery  If you take Lantus insulin, you may take it at your regularly scheduled time the day before surgery  Do not take any other insulins the morning of surgery  6  The 2 nights before surgery, take a shower each night using the special antiseptic soap or soap sponges you received from the office or hospital  Lakisha Quinteross your hair with regular shampoo and rinse completely before using the antiseptic sponges  Use the sponge to wash from your neck down, with special attention to the armpits and groin area  Do not use any other soap or cleanser on your skin  Do not use lotions, powder, deodorant or perfume of any kind on your skin after you shower  Use clean bed linens and wear clean pajamas after your shower  7  You will be prescribed Mupirocin nasal ointment  Apply to both nostrils twice a day for 5 days prior to surgery  8  Do not take a shower the morning of her surgery; you'll be given a special" bath" at the hospital   9  Notify the CT surgery office if you develop a cold, sore throat, cough, fever or other health issues before your surgery    10  Other medication changes included the following: Stop Multivitamin now

## 2018-09-20 NOTE — H&P
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, making her not a suitable candidate for surgical AVR  Courtney Dudley now presents to review the results of these tests and obtain a second surgeon to confirm the suitability of proceeding with transcatheter aortic valve replacment  The patient reports feeling well since her last visit  She states she used to have a stabbing chest pain, but that has been alleviated since she had her cardiac cath performed  She denies shortness of breath, LE edema, syncope, palpitations, PND or orthopnea  She is scheduled to have a visit with her dentist on October 9th  She has a past medical history of Hodgkin's Lymphoma with chemotherapy and radiation to the chest wall at age 7, history of pericardial effusion s/p pericardial window in 2011 at [facility] with post-operative complications necessitating an increased ICU length of stay, chronic diastolic heart failure, HTN, HLD, and anxiety  She was referred to our practice to discuss her treatment options for her known aortic stenosis that was recently found to be worsening after a hospitalization in August 2018 for acute on chronic CHF exacerbation  She had presented with complaints of SOB and LE edema  BNP >1300, CXR showed vascular congestion, EKG (-) for ischemic changes, and troponin (-)  Symptoms resolved after aggressive diuresis   TTE showed moderate-severe aortic stenosis with EF of 50-55%  Patient has followed with cardiologists since discovery and treatment of her pericardial effusion in 2011, seeing them every 6 months with echo  Aortic stenosis had remained mild-moderate during that time  Valvular function has markedly decreased in one years time, since her move from [facility] and subsequent lapse in cardiac care  Patient has no significant family history for heart disease  She ambulates without assistive device and performs ADLs independently  She does not see a dentist regularly  Past Medical History:  Past Medical History:   Diagnosis Date    Anxiety     [facility]     [facility]cong[facility] ([facility] )     Hodgkin's disease ([facility] )     Hyperlipidemia     Hypertension          Past Surgical History:   Past Surgical History:   Procedure Laterality Date    CARDIAC SURGERY      COLON SURGERY      non cancerous tumor    HYSTERECTOMY      NECK SURGERY      TUMOR REMOVAL           Family History:  Family History   Problem Relation Age of Onset    Hyperlipidemia Mother     Hypertension Mother     Diabetes Mother          Social History:    History   Alcohol Use No     History   Drug Use No     History   Smoking Status    Never Smoker   Smokeless Tobacco    Never Used       Home Medications:   Prior to Admission medications    Medication Sig Start Date End Date Taking?  Authorizing Provider   amLODIPine (NORVASC) 10 mg tablet Take 10 mg by mouth daily   6/12/18  Yes Historical Provider, MD   aspirin (ECOTRIN LOW STRENGTH) 81 mg EC tablet Take 81 mg by mouth daily   Yes Historical Provider, MD   atorvastatin (LIPITOR) 80 mg tablet Take 80 mg by mouth daily   6/12/18  Yes Historical Provider, MD   citalopram (CeleXA) 40 mg tablet Take 40 mg by mouth daily   6/12/18  Yes Historical Provider, MD   multivitamin (THERAGRAN) TABS Take 1 tablet by mouth daily   Yes Historical Provider, MD   pantoprazole (PROTONIX) 40 mg tablet Take 40 mg by mouth daily   6/12/18  Yes Historical Provider, MD   furosemide (LASIX) 40 mg tablet Take 1 tablet (40 mg total) by mouth daily for 30 days 8/3/18 9/4/18  DO kristofer Yepez [facility]) 2 % ointment Apply topically 2 (two) times a day Apply inside both nostrils two times per day  Start 5 days prior to surgery  9/20/18   Lucila Jain PA-C       Allergies:  No Known Allergies    Review of Systems:  Review of Systems   Constitutional: Negative for activity change, appetite change, chills, diaphoresis, fatigue, fever and unexpected weight change  HENT: Negative for congestion, dental problem, facial swelling, hearing loss, nosebleeds, rhinorrhea, sinus pain, sinus pressure, sore throat and trouble swallowing  Eyes: Negative  Respiratory: Negative for cough, choking, chest tightness, shortness of breath and wheezing  Cardiovascular: Negative for chest pain, palpitations and leg swelling  Gastrointestinal: Negative for abdominal distention, abdominal pain, blood in stool, constipation, diarrhea and nausea  Endocrine: Negative  Genitourinary: Negative for difficulty urinating, dysuria, frequency and urgency  Musculoskeletal: Negative  Allergic/Immunologic: Negative  Neurological: Negative for dizziness, seizures, syncope, weakness, light-headedness, numbness and headaches  Hematological: Negative for adenopathy  Does not bruise/bleed easily  Psychiatric/Behavioral: Negative  All other systems reviewed and are negative  Vital Signs:     Vitals:    09/20/18 1100 09/20/18 1119   BP: 136/78 140/62   BP Location: Left arm Right arm   Cuff Size: Adult    Pulse: 80    Resp: 14    Temp: 98 2 °F (36 8 °C)    TempSrc: Oral    SpO2: 93%    Weight: 80 3 kg (177 lb)    Height: 5' 4" (1 626 m)        Physical Exam:  Physical Exam   Constitutional: She is oriented to person, place, and time  She appears well-developed and well-nourished  No distress     HENT:   Head: Normocephalic and atraumatic  Right Ear: External ear normal    Left Ear: External ear normal    Mouth/Throat: Oropharynx is clear and moist  No oropharyngeal exudate  Eyes: Conjunctivae and EOM are normal  Pupils are equal, round, and reactive to light  Left eye exhibits no discharge  Neck: Normal range of motion  Neck supple  No JVD present  No tracheal deviation present  Cardiovascular: Normal rate, regular rhythm and intact distal pulses  Murmur heard  Systolic murmur is present with a grade of 4/6   Pulmonary/Chest: Effort normal and breath sounds normal  No respiratory distress  She has no wheezes  She has no rales  Abdominal: Soft  Bowel sounds are normal  She exhibits no distension  There is no tenderness  There is no rebound and no guarding  Musculoskeletal: Normal range of motion  She exhibits no edema, tenderness or deformity  Neurological: She is alert and oriented to person, place, and time  She displays normal reflexes  No cranial nerve deficit  She exhibits normal muscle tone  Coordination normal    Skin: Skin is warm and dry  No rash noted  She is not diaphoretic  No erythema  No pallor  Psychiatric: She has a normal mood and affect  Her behavior is normal  Judgment and thought content normal    Nursing note and vitals reviewed  Lab Results:                 No results found for: HGBA1C  Lab Results   Component Value Date    TROPONINI 0 02 08/02/2018       Imaging Studies:     Echocardiogram: LEFT VENTRICLE:  Systolic function was at the lower limits of normal  Ejection fraction was estimated in the range of 50 % to 55 %  There were no regional wall motion abnormalities  Left ventricular diastolic function parameters were normal      RIGHT VENTRICLE:  The size was normal   Systolic function was normal      MITRAL VALVE:  There was moderate annular calcification  There was mild stenosis  There was trace regurgitation      AORTIC VALVE:  The valve was trileaflet   Leaflets exhibited increased thickness and calcification with reduced cuspal separation  Transaortic velocity was increased due to valvular stenosis  There was moderate to severe stenosis  Peak and mean AV gradients were 50 and 31 mm Hg respectively  GIL by the continuity equation method was 0 6 sq cm  There was mild regurgitation      TRICUSPID VALVE:  There was trace regurgitation  Pulmonary artery systolic pressure was at the upper limits of normal      PULMONIC VALVE:  There was mild to moderate regurgitation  Gated CTA of the chest/abdomen/pelvis: 1  TAVR measurements:    Annulus: diameter 25 7 x 16 8 mm      area: 329 2 sq mm    Annulus to LCA: 10 4 mm    Annulus to RCA: 16 2 mm    Minimal diameter right iliofemoral segment: 6 5 mm    Minimal diameter left iliofemoral segment: 6 7 mm  2  No significant aortoiliac occlusive disease  3  Mild prominence of the common duct and central intrahepatic ducts without obstructive mass identified  May be normal cholecystectomy appearance  4   Air-fluid level seen throughout the esophagus which may be indicative of motility abnormality  5   There are 3 mm noncalcified nodules in the right upper lobe and left upper lobe Based on current Fleischner Society 2017 Guidelines on incidental pulmonary nodule, no routine follow-up is needed if the patient is considered low risk for lung cancer  If the patient is considered high risk for lung cancer, 12 month follow-up non-contrast chest CT is recommended  Left and right cardiac catheterization: CORONARY CIRCULATION:  Left main: The vessel was normal sized  Angiography showed mild atherosclerosis  LAD: The vessel was normal sized  Angiography showed mild atherosclerosis  1st diagonal: The vessel was normal sized  Angiography showed mild atherosclerosis  Circumflex: The vessel was normal sized  Angiography showed mild atherosclerosis  Ramus intermedius: The vessel was normal sized   Angiography showed mild atherosclerosis  RCA: The vessel was large sized (dominant)  Angiography showed mild atherosclerosis  Right PDA: The vessel was normal sized  Angiography showed mild atherosclerosis  Right posterolateral segment: The vessel was normal sized  Angiography showed minor luminal irregularities      CARDIAC STRUCTURES:  There was severe aortic stenosis  Carotid artery ultrasound: RIGHT:  There is <50% stenosis noted in the internal carotid artery  Plaque is  heterogenous and irregular  Vertebral artery flow is antegrade  There is no significant subclavian artery  disease  LEFT:  There is <50% stenosis noted in the internal carotid artery  Plaque is  heterogenous and irregular  Vertebral artery flow is antegrade  There is no significant subclavian artery  disease  I have personally reviewed pertinent films in PACS    TAVR evaluation Assessment:     5 Meter Walk Test:      Attempt 1: 4   Attempt 2: 4   Attempt 3: 4    STS Risk Score:     Risk of mortality 2 1 %    [place]: II    KCCQ-12 was completed    Assessment:  Patient Active Problem List    Diagnosis Date Noted    Moderate to severe aortic stenosis 08/03/2018    [facility] 08/01/2018    Hypertension 08/01/2018     Severe aortic stenosis; Ongoing TAVR workup    Plan:    Yamini Amaro has severe symptomatic aortic stenosis  Based on their STS risk assessment, they have undergone testing for transcatheter aortic valve replacement  The results of these studies have been interpreted by two independent cardiac surgeons who have determined the patient to be Prohibitive risk for open aortic valve replacement due to a porcelain aorta and pulmonary veins  The risks, benefits, and alternatives to TAVR were discussed in detail with the patient today  They understand and wish to proceed with transfemoral transcatheter aortic valve replacement  She will complete preoperative PFTs, ABG and laboratory studies    Informed consent was obtained and a date for the intervention has been set for October 16th       Prudence Shultz was comfortable with our recommendations, and their questions were answered to their satisfaction  The following preoperative instructions were provided at the conclusion of their consultation:     1  You will receive a phone call from the hospital between 2:00 PM and 8:00 PM the day prior to surgery to confirm arrival time and location  For surgery on Mondays, you will receive a call on Friday  2  Do not drink or eat anything after midnight the night before surgery  That includes no water, candy, gum, lozenges, Lifesavers, etc  We recommend you not eat any "junk" food, consume alcohol or smoke the night before surgery  3  Continue taking aspirin but only 81 mg daily  4  If you take Coumadin and/or Plavix, discontinue it 5 days before surgery  5  If you are diabetic, do not take any of your diabetic pills the morning of surgery  If you take Lantus insulin, you may take it at your regularly scheduled time the day before surgery  Do not take any other insulins the morning of surgery  6  The 2 nights before surgery, take a shower each night using the special antiseptic soap or soap sponges you received from the office or [facility] your hair with regular shampoo and rinse completely before using the antiseptic sponges  Use the sponge to wash from your neck down, with special attention to the armpits and groin area  Do not use any other soap or cleanser on your skin  Do not use lotions, powder, deodorant or perfume of any kind on your skin after you shower  Use clean bed linens and wear clean pajamas after your shower  7  You will be prescribed Mupirocin nasal ointment  Apply to both nostrils twice a day for 5 days prior to surgery    8  Do not take a shower the morning of her surgery; you'll be given a special" bath" at the hospital   9  Notify the CT surgery office if you develop a cold, sore throat, cough, fever or other health issues before your surgery    10  Other medication changes included the following: Stop Multivitamin now     SIGNATURE: Parker Dakins, PA-C  DATE: September 20, 2018  TIME: 12:00 PM

## 2018-10-11 ENCOUNTER — HOSPITAL ENCOUNTER (OUTPATIENT)
Dept: PULMONOLOGY | Facility: HOSPITAL | Age: 53
Discharge: HOME | End: 2018-10-11
Payer: COMMERCIAL

## 2018-10-11 ENCOUNTER — TRANSCRIBE ORDERS (OUTPATIENT)
Dept: ADMINISTRATIVE | Facility: HOSPITAL | Age: 53
End: 2018-10-11

## 2018-10-11 ENCOUNTER — APPOINTMENT (OUTPATIENT)
Dept: LAB | Facility: HOSPITAL | Age: 53
End: 2018-10-11
Payer: COMMERCIAL

## 2018-10-11 DIAGNOSIS — I35.0 AORTIC STENOSIS, SEVERE: ICD-10-CM

## 2018-10-11 DIAGNOSIS — Z01.818 PREOP EXAMINATION: Primary | ICD-10-CM

## 2018-10-11 DIAGNOSIS — Z01.818 PREOP EXAMINATION: ICD-10-CM

## 2018-10-11 LAB
ABO GROUP BLD: NORMAL
ALBUMIN SERPL BCP-MCNC: 3.6 G/DL (ref 3.5–5)
ALP SERPL-CCNC: 124 U/L (ref 46–116)
ALT SERPL W P-5'-P-CCNC: 26 U/L (ref 12–78)
ANION GAP SERPL CALCULATED.3IONS-SCNC: 2 MMOL/L (ref 4–13)
AST SERPL W P-5'-P-CCNC: 20 U/L (ref 5–45)
BACTERIA UR QL AUTO: ABNORMAL /HPF
BASE EXCESS BLDA CALC-SCNC: 14 MMOL/L (ref -2–3)
BILIRUB SERPL-MCNC: 0.4 MG/DL (ref 0.2–1)
BILIRUB UR QL STRIP: NEGATIVE
BLD GP AB SCN SERPL QL: NEGATIVE
BUN SERPL-MCNC: 14 MG/DL (ref 5–25)
CA-I BLD-SCNC: 1.09 MMOL/L (ref 1.12–1.32)
CALCIUM SERPL-MCNC: 8.4 MG/DL (ref 8.3–10.1)
CHLORIDE SERPL-SCNC: 98 MMOL/L (ref 100–108)
CLARITY UR: CLEAR
CO2 SERPL-SCNC: 38 MMOL/L (ref 21–32)
COLOR UR: YELLOW
CREAT SERPL-MCNC: 0.87 MG/DL (ref 0.6–1.3)
ERYTHROCYTE [DISTWIDTH] IN BLOOD BY AUTOMATED COUNT: 15 % (ref 11.6–15.1)
EST. AVERAGE GLUCOSE BLD GHB EST-MCNC: 148 MG/DL
FIO2 GAS DIL.REBREATH: 21 L
GFR SERPL CREATININE-BSD FRML MDRD: 77 ML/MIN/1.73SQ M
GLUCOSE P FAST SERPL-MCNC: 118 MG/DL (ref 65–99)
GLUCOSE SERPL-MCNC: 111 MG/DL (ref 65–140)
GLUCOSE UR STRIP-MCNC: NEGATIVE MG/DL
HBA1C MFR BLD: 6.8 % (ref 4.2–6.3)
HCO3 BLDA-SCNC: 37.8 MMOL/L (ref 22–28)
HCT VFR BLD AUTO: 42.6 % (ref 34.8–46.1)
HCT VFR BLD CALC: 36 % (ref 34.8–46.1)
HGB BLD-MCNC: 13.6 G/DL (ref 11.5–15.4)
HGB BLDA-MCNC: 12.2 G/DL (ref 11.5–15.4)
HGB UR QL STRIP.AUTO: ABNORMAL
INR PPP: 1.04 (ref 0.86–1.17)
KETONES UR STRIP-MCNC: NEGATIVE MG/DL
LEUKOCYTE ESTERASE UR QL STRIP: ABNORMAL
MCH RBC QN AUTO: 28.2 PG (ref 26.8–34.3)
MCHC RBC AUTO-ENTMCNC: 31.9 G/DL (ref 31.4–37.4)
MCV RBC AUTO: 88 FL (ref 82–98)
NITRITE UR QL STRIP: NEGATIVE
NON-SQ EPI CELLS URNS QL MICRO: ABNORMAL /HPF
PCO2 BLD: 39 MMOL/L (ref 21–32)
PCO2 BLD: 44.9 MM HG (ref 36–44)
PH BLD: 7.53 [PH] (ref 7.35–7.45)
PH UR STRIP.AUTO: 6 [PH] (ref 4.5–8)
PLATELET # BLD AUTO: 447 THOUSANDS/UL (ref 149–390)
PMV BLD AUTO: 10.3 FL (ref 8.9–12.7)
PO2 BLD: 30 MM HG (ref 75–129)
POTASSIUM BLD-SCNC: 2.8 MMOL/L (ref 3.5–5.3)
POTASSIUM SERPL-SCNC: 3.3 MMOL/L (ref 3.5–5.3)
PROT SERPL-MCNC: 8.2 G/DL (ref 6.4–8.2)
PROT UR STRIP-MCNC: NEGATIVE MG/DL
PROTHROMBIN TIME: 13.5 SECONDS (ref 11.8–14.2)
RBC # BLD AUTO: 4.82 MILLION/UL (ref 3.81–5.12)
RBC #/AREA URNS AUTO: ABNORMAL /HPF
RH BLD: POSITIVE
SAO2 % BLD FROM PO2: 64 % (ref 95–98)
SODIUM BLD-SCNC: 133 MMOL/L (ref 136–145)
SODIUM SERPL-SCNC: 138 MMOL/L (ref 136–145)
SP GR UR STRIP.AUTO: 1.01 (ref 1–1.03)
SPECIMEN EXPIRATION DATE: NORMAL
SPECIMEN SOURCE: ABNORMAL
UROBILINOGEN UR QL STRIP.AUTO: 0.2 E.U./DL
WBC # BLD AUTO: 10.51 THOUSAND/UL (ref 4.31–10.16)
WBC #/AREA URNS AUTO: ABNORMAL /HPF

## 2018-10-11 PROCEDURE — 94727 GAS DIL/WSHOT DETER LNG VOL: CPT

## 2018-10-11 PROCEDURE — 80053 COMPREHEN METABOLIC PANEL: CPT

## 2018-10-11 PROCEDURE — 85027 COMPLETE CBC AUTOMATED: CPT

## 2018-10-11 PROCEDURE — 85610 PROTHROMBIN TIME: CPT

## 2018-10-11 PROCEDURE — 94060 EVALUATION OF WHEEZING: CPT

## 2018-10-11 PROCEDURE — 36600 WITHDRAWAL OF ARTERIAL BLOOD: CPT

## 2018-10-11 PROCEDURE — 94150 VITAL CAPACITY TEST: CPT | Performed by: INTERNAL MEDICINE

## 2018-10-11 PROCEDURE — 94726 PLETHYSMOGRAPHY LUNG VOLUMES: CPT | Performed by: INTERNAL MEDICINE

## 2018-10-11 PROCEDURE — 82947 ASSAY GLUCOSE BLOOD QUANT: CPT

## 2018-10-11 PROCEDURE — 86850 RBC ANTIBODY SCREEN: CPT

## 2018-10-11 PROCEDURE — 84295 ASSAY OF SERUM SODIUM: CPT

## 2018-10-11 PROCEDURE — 85014 HEMATOCRIT: CPT

## 2018-10-11 PROCEDURE — 86900 BLOOD TYPING SEROLOGIC ABO: CPT

## 2018-10-11 PROCEDURE — 81001 URINALYSIS AUTO W/SCOPE: CPT | Performed by: THORACIC SURGERY (CARDIOTHORACIC VASCULAR SURGERY)

## 2018-10-11 PROCEDURE — 87081 CULTURE SCREEN ONLY: CPT

## 2018-10-11 PROCEDURE — 83036 HEMOGLOBIN GLYCOSYLATED A1C: CPT

## 2018-10-11 PROCEDURE — 86901 BLOOD TYPING SEROLOGIC RH(D): CPT

## 2018-10-11 PROCEDURE — 36415 COLL VENOUS BLD VENIPUNCTURE: CPT

## 2018-10-11 PROCEDURE — 82330 ASSAY OF CALCIUM: CPT

## 2018-10-11 PROCEDURE — 84132 ASSAY OF SERUM POTASSIUM: CPT

## 2018-10-11 PROCEDURE — 94729 DIFFUSING CAPACITY: CPT

## 2018-10-11 PROCEDURE — 82803 BLOOD GASES ANY COMBINATION: CPT

## 2018-10-11 RX ORDER — ALBUTEROL SULFATE 2.5 MG/3ML
2.5 SOLUTION RESPIRATORY (INHALATION) ONCE
Status: COMPLETED | OUTPATIENT
Start: 2018-10-11 | End: 2018-10-11

## 2018-10-11 RX ADMIN — ALBUTEROL SULFATE 2.5 MG: 2.5 SOLUTION RESPIRATORY (INHALATION) at 10:18

## 2018-10-12 LAB — MRSA NOSE QL CULT: NORMAL

## 2018-10-14 ENCOUNTER — ANESTHESIA EVENT (OUTPATIENT)
Dept: PERIOP | Facility: HOSPITAL | Age: 53
DRG: 175 | End: 2018-10-14
Payer: COMMERCIAL

## 2018-10-16 ENCOUNTER — APPOINTMENT (INPATIENT)
Dept: RADIOLOGY | Facility: HOSPITAL | Age: 53
DRG: 175 | End: 2018-10-16
Payer: COMMERCIAL

## 2018-10-16 ENCOUNTER — APPOINTMENT (INPATIENT)
Dept: NON INVASIVE DIAGNOSTICS | Facility: HOSPITAL | Age: 53
DRG: 175 | End: 2018-10-16
Payer: COMMERCIAL

## 2018-10-16 ENCOUNTER — APPOINTMENT (OUTPATIENT)
Dept: NON INVASIVE DIAGNOSTICS | Facility: HOSPITAL | Age: 53
DRG: 175 | End: 2018-10-16
Attending: THORACIC SURGERY (CARDIOTHORACIC VASCULAR SURGERY)
Payer: COMMERCIAL

## 2018-10-16 ENCOUNTER — ANESTHESIA (OUTPATIENT)
Dept: PERIOP | Facility: HOSPITAL | Age: 53
DRG: 175 | End: 2018-10-16
Payer: COMMERCIAL

## 2018-10-16 ENCOUNTER — HOSPITAL ENCOUNTER (INPATIENT)
Facility: HOSPITAL | Age: 53
LOS: 3 days | Discharge: HOME HEALTH | DRG: 175 | End: 2018-10-19
Attending: THORACIC SURGERY (CARDIOTHORACIC VASCULAR SURGERY) | Admitting: THORACIC SURGERY (CARDIOTHORACIC VASCULAR SURGERY)
Payer: COMMERCIAL

## 2018-10-16 DIAGNOSIS — I35.0 AORTIC STENOSIS, SEVERE: ICD-10-CM

## 2018-10-16 DIAGNOSIS — I35.9 AORTIC VALVE DISEASE: ICD-10-CM

## 2018-10-16 DIAGNOSIS — J95.2 ACUTE POSTOPERATIVE PULMONARY INSUFFICIENCY (HCC): ICD-10-CM

## 2018-10-16 DIAGNOSIS — Z95.2 S/P TAVR (TRANSCATHETER AORTIC VALVE REPLACEMENT): Primary | ICD-10-CM

## 2018-10-16 PROBLEM — I50.9 CHF (CONGESTIVE HEART FAILURE) (HCC): Chronic | Status: ACTIVE | Noted: 2018-08-01

## 2018-10-16 PROBLEM — I10 HYPERTENSION: Chronic | Status: ACTIVE | Noted: 2018-08-01

## 2018-10-16 PROBLEM — I25.119 CORONARY ARTERY DISEASE INVOLVING NATIVE CORONARY ARTERY OF NATIVE HEART WITH ANGINA PECTORIS (HCC): Status: ACTIVE | Noted: 2018-10-16

## 2018-10-16 PROBLEM — Z98.890 S/P PERICARDIAL WINDOW CREATION: Status: ACTIVE | Noted: 2018-10-16

## 2018-10-16 PROBLEM — I25.10 CORONARY ARTERY DISEASE INVOLVING NATIVE CORONARY ARTERY OF NATIVE HEART: Chronic | Status: ACTIVE | Noted: 2018-10-16

## 2018-10-16 PROBLEM — E78.5 HLD (HYPERLIPIDEMIA): Status: ACTIVE | Noted: 2018-10-16

## 2018-10-16 PROBLEM — Z85.71 HISTORY OF HODGKIN'S LYMPHOMA: Status: ACTIVE | Noted: 2018-10-16

## 2018-10-16 PROBLEM — F41.9 ANXIETY: Status: ACTIVE | Noted: 2018-10-16

## 2018-10-16 PROBLEM — Z95.3 S/P TAVR (TRANSCATHETER AORTIC VALVE REPLACEMENT), BIOPROSTHETIC: Status: ACTIVE | Noted: 2018-10-16

## 2018-10-16 LAB
ANION GAP SERPL CALCULATED.3IONS-SCNC: 7 MMOL/L (ref 4–13)
ATRIAL RATE: 37 BPM
ATRIAL RATE: 63 BPM
ATRIAL RATE: 65 BPM
BASE EXCESS BLDA CALC-SCNC: 1.6 MMOL/L
BASE EXCESS BLDA CALC-SCNC: 2 MMOL/L (ref -2–3)
BASE EXCESS BLDA CALC-SCNC: 3 MMOL/L (ref -2–3)
BASE EXCESS BLDA CALC-SCNC: 3.1 MMOL/L
BASE EXCESS BLDA CALC-SCNC: 5 MMOL/L (ref -2–3)
BUN SERPL-MCNC: 12 MG/DL (ref 5–25)
CA-I BLD-SCNC: 1.01 MMOL/L (ref 1.12–1.32)
CA-I BLD-SCNC: 1.02 MMOL/L (ref 1.12–1.32)
CA-I BLD-SCNC: 1.08 MMOL/L (ref 1.12–1.32)
CALCIUM SERPL-MCNC: 7.3 MG/DL (ref 8.3–10.1)
CHLORIDE SERPL-SCNC: 103 MMOL/L (ref 100–108)
CO2 SERPL-SCNC: 27 MMOL/L (ref 21–32)
CREAT SERPL-MCNC: 0.75 MG/DL (ref 0.6–1.3)
GFR SERPL CREATININE-BSD FRML MDRD: 93 ML/MIN/1.73SQ M
GLUCOSE SERPL-MCNC: 111 MG/DL (ref 65–140)
GLUCOSE SERPL-MCNC: 119 MG/DL (ref 65–140)
GLUCOSE SERPL-MCNC: 122 MG/DL (ref 65–140)
GLUCOSE SERPL-MCNC: 122 MG/DL (ref 65–140)
GLUCOSE SERPL-MCNC: 138 MG/DL (ref 65–140)
GLUCOSE SERPL-MCNC: 147 MG/DL (ref 65–140)
GLUCOSE SERPL-MCNC: 80 MG/DL (ref 65–140)
HCO3 BLDA-SCNC: 27.2 MMOL/L (ref 22–28)
HCO3 BLDA-SCNC: 27.7 MMOL/L (ref 22–28)
HCO3 BLDA-SCNC: 27.9 MMOL/L (ref 22–28)
HCO3 BLDA-SCNC: 28.5 MMOL/L (ref 22–28)
HCO3 BLDA-SCNC: 32.4 MMOL/L (ref 22–28)
HCT VFR BLD AUTO: 33.4 % (ref 34.8–46.1)
HCT VFR BLD AUTO: 36.5 % (ref 34.8–46.1)
HCT VFR BLD CALC: 29 % (ref 34.8–46.1)
HCT VFR BLD CALC: 29 % (ref 34.8–46.1)
HCT VFR BLD CALC: 33 % (ref 34.8–46.1)
HGB BLD-MCNC: 10.5 G/DL (ref 11.5–15.4)
HGB BLD-MCNC: 11.4 G/DL (ref 11.5–15.4)
HGB BLDA-MCNC: 11.2 G/DL (ref 11.5–15.4)
HGB BLDA-MCNC: 9.9 G/DL (ref 11.5–15.4)
HGB BLDA-MCNC: 9.9 G/DL (ref 11.5–15.4)
HOROWITZ INDEX BLDA+IHG-RTO: 50 MM[HG]
KCT BLD-ACNC: 115 SEC (ref 89–137)
KCT BLD-ACNC: 115 SEC (ref 89–137)
KCT BLD-ACNC: 254 SEC (ref 89–137)
O2 CT BLDA-SCNC: 15.8 ML/DL (ref 16–23)
O2 CT BLDA-SCNC: 16.5 ML/DL (ref 16–23)
OXYHGB MFR BLDA: 95.5 % (ref 94–97)
OXYHGB MFR BLDA: 96.4 % (ref 94–97)
P AXIS: 34 DEGREES
P AXIS: 66 DEGREES
PCO2 BLD: 29 MMOL/L (ref 21–32)
PCO2 BLD: 29 MMOL/L (ref 21–32)
PCO2 BLD: 34 MMOL/L (ref 21–32)
PCO2 BLD: 44.2 MM HG (ref 36–44)
PCO2 BLD: 45.1 MM HG (ref 36–44)
PCO2 BLD: 59.9 MM HG (ref 36–44)
PCO2 BLDA: 42.5 MM HG (ref 36–44)
PCO2 BLDA: 55.4 MM HG (ref 36–44)
PEEP RESPIRATORY: 5 CM[H2O]
PH BLD: 7.34 [PH] (ref 7.35–7.45)
PH BLD: 7.39 [PH] (ref 7.35–7.45)
PH BLD: 7.41 [PH] (ref 7.35–7.45)
PH BLDA: 7.33 [PH] (ref 7.35–7.45)
PH BLDA: 7.43 [PH] (ref 7.35–7.45)
PLATELET # BLD AUTO: 349 THOUSANDS/UL (ref 149–390)
PMV BLD AUTO: 10.3 FL (ref 8.9–12.7)
PO2 BLD: 110 MM HG (ref 75–129)
PO2 BLD: 42 MM HG (ref 75–129)
PO2 BLD: 59 MM HG (ref 75–129)
PO2 BLDA: 92.7 MM HG (ref 75–129)
PO2 BLDA: 93.1 MM HG (ref 75–129)
POTASSIUM BLD-SCNC: 2.8 MMOL/L (ref 3.5–5.3)
POTASSIUM BLD-SCNC: 2.9 MMOL/L (ref 3.5–5.3)
POTASSIUM BLD-SCNC: 3.2 MMOL/L (ref 3.5–5.3)
POTASSIUM SERPL-SCNC: 3.1 MMOL/L (ref 3.5–5.3)
POTASSIUM SERPL-SCNC: 3.4 MMOL/L (ref 3.5–5.3)
PR INTERVAL: 163 MS
PR INTERVAL: 179 MS
PS VENT FIO2: 60
PS VENT PEEP: 5
QRS AXIS: -55 DEGREES
QRS AXIS: 105 DEGREES
QRS AXIS: 70 DEGREES
QRSD INTERVAL: 125 MS
QRSD INTERVAL: 129 MS
QRSD INTERVAL: 150 MS
QT INTERVAL: 496 MS
QT INTERVAL: 513 MS
QT INTERVAL: 617 MS
QTC INTERVAL: 485 MS
QTC INTERVAL: 508 MS
QTC INTERVAL: 534 MS
SAO2 % BLD FROM PO2: 73 % (ref 95–98)
SAO2 % BLD FROM PO2: 90 % (ref 95–98)
SAO2 % BLD FROM PO2: 98 % (ref 95–98)
SODIUM BLD-SCNC: 141 MMOL/L (ref 136–145)
SODIUM SERPL-SCNC: 137 MMOL/L (ref 136–145)
SPECIMEN SOURCE: ABNORMAL
SPECIMEN SOURCE: NORMAL
T WAVE AXIS: -67 DEGREES
T WAVE AXIS: 216 DEGREES
T WAVE AXIS: 94 DEGREES
VENT - PS: ABNORMAL
VENT AC: 14
VENT- AC: AC
VENTRICULAR RATE: 37 BPM
VENTRICULAR RATE: 63 BPM
VENTRICULAR RATE: 65 BPM
VT SETTING VENT: 400 ML

## 2018-10-16 PROCEDURE — 02H63JZ INSERTION OF PACEMAKER LEAD INTO RIGHT ATRIUM, PERCUTANEOUS APPROACH: ICD-10-PCS | Performed by: INTERNAL MEDICINE

## 2018-10-16 PROCEDURE — 94002 VENT MGMT INPAT INIT DAY: CPT

## 2018-10-16 PROCEDURE — 02H633Z INSERTION OF INFUSION DEVICE INTO RIGHT ATRIUM, PERCUTANEOUS APPROACH: ICD-10-PCS | Performed by: ANESTHESIOLOGY

## 2018-10-16 PROCEDURE — 85014 HEMATOCRIT: CPT

## 2018-10-16 PROCEDURE — 82947 ASSAY GLUCOSE BLOOD QUANT: CPT

## 2018-10-16 PROCEDURE — 85014 HEMATOCRIT: CPT | Performed by: PHYSICIAN ASSISTANT

## 2018-10-16 PROCEDURE — C1751 CATH, INF, PER/CENT/MIDLINE: HCPCS | Performed by: THORACIC SURGERY (CARDIOTHORACIC VASCULAR SURGERY)

## 2018-10-16 PROCEDURE — 5A1223Z PERFORMANCE OF CARDIAC PACING, CONTINUOUS: ICD-10-PCS | Performed by: EMERGENCY MEDICINE

## 2018-10-16 PROCEDURE — 02HK3JZ INSERTION OF PACEMAKER LEAD INTO RIGHT VENTRICLE, PERCUTANEOUS APPROACH: ICD-10-PCS | Performed by: INTERNAL MEDICINE

## 2018-10-16 PROCEDURE — 33208 INSRT HEART PM ATRIAL & VENT: CPT | Performed by: PHYSICIAN ASSISTANT

## 2018-10-16 PROCEDURE — 82948 REAGENT STRIP/BLOOD GLUCOSE: CPT

## 2018-10-16 PROCEDURE — 82330 ASSAY OF CALCIUM: CPT

## 2018-10-16 PROCEDURE — 5A12012 PERFORMANCE OF CARDIAC OUTPUT, SINGLE, MANUAL: ICD-10-PCS | Performed by: THORACIC SURGERY (CARDIOTHORACIC VASCULAR SURGERY)

## 2018-10-16 PROCEDURE — 76377 3D RENDER W/INTRP POSTPROCES: CPT

## 2018-10-16 PROCEDURE — C1769 GUIDE WIRE: HCPCS | Performed by: THORACIC SURGERY (CARDIOTHORACIC VASCULAR SURGERY)

## 2018-10-16 PROCEDURE — C1785 PMKR, DUAL, RATE-RESP: HCPCS

## 2018-10-16 PROCEDURE — 02RF38Z REPLACEMENT OF AORTIC VALVE WITH ZOOPLASTIC TISSUE, PERCUTANEOUS APPROACH: ICD-10-PCS | Performed by: THORACIC SURGERY (CARDIOTHORACIC VASCULAR SURGERY)

## 2018-10-16 PROCEDURE — 93005 ELECTROCARDIOGRAM TRACING: CPT

## 2018-10-16 PROCEDURE — 33361 REPLACE AORTIC VALVE PERQ: CPT | Performed by: THORACIC SURGERY (CARDIOTHORACIC VASCULAR SURGERY)

## 2018-10-16 PROCEDURE — 82805 BLOOD GASES W/O2 SATURATION: CPT | Performed by: PHYSICIAN ASSISTANT

## 2018-10-16 PROCEDURE — 85018 HEMOGLOBIN: CPT | Performed by: PHYSICIAN ASSISTANT

## 2018-10-16 PROCEDURE — 80048 BASIC METABOLIC PNL TOTAL CA: CPT | Performed by: PHYSICIAN ASSISTANT

## 2018-10-16 PROCEDURE — 85049 AUTOMATED PLATELET COUNT: CPT | Performed by: PHYSICIAN ASSISTANT

## 2018-10-16 PROCEDURE — 86920 COMPATIBILITY TEST SPIN: CPT

## 2018-10-16 PROCEDURE — 84132 ASSAY OF SERUM POTASSIUM: CPT

## 2018-10-16 PROCEDURE — 33208 INSRT HEART PM ATRIAL & VENT: CPT | Performed by: INTERNAL MEDICINE

## 2018-10-16 PROCEDURE — 71045 X-RAY EXAM CHEST 1 VIEW: CPT

## 2018-10-16 PROCEDURE — 82803 BLOOD GASES ANY COMBINATION: CPT

## 2018-10-16 PROCEDURE — 33361 REPLACE AORTIC VALVE PERQ: CPT | Performed by: INTERNAL MEDICINE

## 2018-10-16 PROCEDURE — C1898 LEAD, PMKR, OTHER THAN TRANS: HCPCS

## 2018-10-16 PROCEDURE — 84132 ASSAY OF SERUM POTASSIUM: CPT | Performed by: PHYSICIAN ASSISTANT

## 2018-10-16 PROCEDURE — C1894 INTRO/SHEATH, NON-LASER: HCPCS | Performed by: THORACIC SURGERY (CARDIOTHORACIC VASCULAR SURGERY)

## 2018-10-16 PROCEDURE — 84295 ASSAY OF SERUM SODIUM: CPT

## 2018-10-16 PROCEDURE — 93312 ECHO TRANSESOPHAGEAL: CPT

## 2018-10-16 PROCEDURE — 3E0102A INTRODUCTION OF ANTI-INFECTIVE ENVELOPE INTO SUBCUTANEOUS TISSUE, OPEN APPROACH: ICD-10-PCS | Performed by: INTERNAL MEDICINE

## 2018-10-16 PROCEDURE — 93010 ELECTROCARDIOGRAM REPORT: CPT | Performed by: INTERNAL MEDICINE

## 2018-10-16 PROCEDURE — C1892 INTRO/SHEATH,FIXED,PEEL-AWAY: HCPCS | Performed by: PHYSICIAN ASSISTANT

## 2018-10-16 PROCEDURE — 99252 IP/OBS CONSLTJ NEW/EST SF 35: CPT | Performed by: INTERNAL MEDICINE

## 2018-10-16 PROCEDURE — C1760 CLOSURE DEV, VASC: HCPCS | Performed by: THORACIC SURGERY (CARDIOTHORACIC VASCULAR SURGERY)

## 2018-10-16 PROCEDURE — 94760 N-INVAS EAR/PLS OXIMETRY 1: CPT

## 2018-10-16 PROCEDURE — 85347 COAGULATION TIME ACTIVATED: CPT

## 2018-10-16 PROCEDURE — 99291 CRITICAL CARE FIRST HOUR: CPT | Performed by: EMERGENCY MEDICINE

## 2018-10-16 PROCEDURE — 0JH606Z INSERTION OF PACEMAKER, DUAL CHAMBER INTO CHEST SUBCUTANEOUS TISSUE AND FASCIA, OPEN APPROACH: ICD-10-PCS | Performed by: INTERNAL MEDICINE

## 2018-10-16 DEVICE — TAVR SAPIEN 3 CMNDR DLV SYS 23MM: Type: IMPLANTABLE DEVICE | Site: HEART | Status: FUNCTIONAL

## 2018-10-16 DEVICE — PERCLOSE PROGLIDE™ SUTURE-MEDIATED CLOSURE SYSTEM
Type: IMPLANTABLE DEVICE | Site: GROIN | Status: FUNCTIONAL
Brand: PERCLOSE PROGLIDE™

## 2018-10-16 RX ORDER — SODIUM CHLORIDE, SODIUM GLUCONATE, SODIUM ACETATE, POTASSIUM CHLORIDE, MAGNESIUM CHLORIDE, SODIUM PHOSPHATE, DIBASIC, AND POTASSIUM PHOSPHATE .53; .5; .37; .037; .03; .012; .00082 G/100ML; G/100ML; G/100ML; G/100ML; G/100ML; G/100ML; G/100ML
50 INJECTION, SOLUTION INTRAVENOUS CONTINUOUS
Status: DISCONTINUED | OUTPATIENT
Start: 2018-10-16 | End: 2018-10-17

## 2018-10-16 RX ORDER — POTASSIUM CHLORIDE 14.9 MG/ML
20 INJECTION INTRAVENOUS ONCE AS NEEDED
Status: COMPLETED | OUTPATIENT
Start: 2018-10-16 | End: 2018-10-16

## 2018-10-16 RX ORDER — FENTANYL CITRATE 50 UG/ML
50 INJECTION, SOLUTION INTRAMUSCULAR; INTRAVENOUS
Status: DISCONTINUED | OUTPATIENT
Start: 2018-10-16 | End: 2018-10-17

## 2018-10-16 RX ORDER — SODIUM CHLORIDE 9 MG/ML
INJECTION, SOLUTION INTRAVENOUS CONTINUOUS PRN
Status: DISCONTINUED | OUTPATIENT
Start: 2018-10-16 | End: 2018-10-16 | Stop reason: SURG

## 2018-10-16 RX ORDER — GENTAMICIN SULFATE 40 MG/ML
INJECTION, SOLUTION INTRAMUSCULAR; INTRAVENOUS CODE/TRAUMA/SEDATION MEDICATION
Status: COMPLETED | OUTPATIENT
Start: 2018-10-16 | End: 2018-10-16

## 2018-10-16 RX ORDER — SODIUM CHLORIDE, SODIUM LACTATE, POTASSIUM CHLORIDE, CALCIUM CHLORIDE 600; 310; 30; 20 MG/100ML; MG/100ML; MG/100ML; MG/100ML
125 INJECTION, SOLUTION INTRAVENOUS CONTINUOUS
Status: DISCONTINUED | OUTPATIENT
Start: 2018-10-16 | End: 2018-10-16 | Stop reason: HOSPADM

## 2018-10-16 RX ORDER — POLYETHYLENE GLYCOL 3350 17 G/17G
17 POWDER, FOR SOLUTION ORAL DAILY
Status: DISCONTINUED | OUTPATIENT
Start: 2018-10-16 | End: 2018-10-19 | Stop reason: HOSPADM

## 2018-10-16 RX ORDER — FUROSEMIDE 40 MG/1
40 TABLET ORAL DAILY
COMMUNITY
End: 2018-11-02 | Stop reason: SDUPTHER

## 2018-10-16 RX ORDER — HEPARIN SODIUM 1000 [USP'U]/ML
INJECTION, SOLUTION INTRAVENOUS; SUBCUTANEOUS AS NEEDED
Status: DISCONTINUED | OUTPATIENT
Start: 2018-10-16 | End: 2018-10-16 | Stop reason: SURG

## 2018-10-16 RX ORDER — PROTAMINE SULFATE 10 MG/ML
INJECTION, SOLUTION INTRAVENOUS AS NEEDED
Status: DISCONTINUED | OUTPATIENT
Start: 2018-10-16 | End: 2018-10-16 | Stop reason: SURG

## 2018-10-16 RX ORDER — CALCIUM CHLORIDE 100 MG/ML
1 INJECTION INTRAVENOUS; INTRAVENTRICULAR ONCE
Status: DISCONTINUED | OUTPATIENT
Start: 2018-10-16 | End: 2018-10-17

## 2018-10-16 RX ORDER — ROCURONIUM BROMIDE 10 MG/ML
INJECTION, SOLUTION INTRAVENOUS AS NEEDED
Status: DISCONTINUED | OUTPATIENT
Start: 2018-10-16 | End: 2018-10-16 | Stop reason: SURG

## 2018-10-16 RX ORDER — ASPIRIN 81 MG/1
81 TABLET, CHEWABLE ORAL DAILY
Status: DISCONTINUED | OUTPATIENT
Start: 2018-10-16 | End: 2018-10-19 | Stop reason: HOSPADM

## 2018-10-16 RX ORDER — HEPARIN SODIUM 5000 [USP'U]/ML
5000 INJECTION, SOLUTION INTRAVENOUS; SUBCUTANEOUS EVERY 8 HOURS SCHEDULED
Status: DISCONTINUED | OUTPATIENT
Start: 2018-10-17 | End: 2018-10-19 | Stop reason: HOSPADM

## 2018-10-16 RX ORDER — ONDANSETRON 2 MG/ML
4 INJECTION INTRAMUSCULAR; INTRAVENOUS EVERY 6 HOURS PRN
Status: DISCONTINUED | OUTPATIENT
Start: 2018-10-16 | End: 2018-10-19 | Stop reason: HOSPADM

## 2018-10-16 RX ORDER — MAGNESIUM HYDROXIDE 1200 MG/15ML
LIQUID ORAL AS NEEDED
Status: DISCONTINUED | OUTPATIENT
Start: 2018-10-16 | End: 2018-10-16 | Stop reason: HOSPADM

## 2018-10-16 RX ORDER — POTASSIUM CHLORIDE 29.8 MG/ML
40 INJECTION INTRAVENOUS ONCE
Status: COMPLETED | OUTPATIENT
Start: 2018-10-16 | End: 2018-10-17

## 2018-10-16 RX ORDER — CEFAZOLIN SODIUM 1 G/3ML
INJECTION, POWDER, FOR SOLUTION INTRAMUSCULAR; INTRAVENOUS AS NEEDED
Status: DISCONTINUED | OUTPATIENT
Start: 2018-10-16 | End: 2018-10-16 | Stop reason: SURG

## 2018-10-16 RX ORDER — LIDOCAINE HYDROCHLORIDE 10 MG/ML
0.5 INJECTION, SOLUTION INFILTRATION; PERINEURAL ONCE AS NEEDED
Status: DISCONTINUED | OUTPATIENT
Start: 2018-10-16 | End: 2018-10-16 | Stop reason: HOSPADM

## 2018-10-16 RX ORDER — FENTANYL CITRATE 50 UG/ML
INJECTION, SOLUTION INTRAMUSCULAR; INTRAVENOUS AS NEEDED
Status: DISCONTINUED | OUTPATIENT
Start: 2018-10-16 | End: 2018-10-16 | Stop reason: SURG

## 2018-10-16 RX ORDER — CHLORHEXIDINE GLUCONATE 0.12 MG/ML
15 RINSE ORAL ONCE
Status: COMPLETED | OUTPATIENT
Start: 2018-10-16 | End: 2018-10-16

## 2018-10-16 RX ORDER — ACETAMINOPHEN 325 MG/1
650 TABLET ORAL EVERY 4 HOURS PRN
Status: DISCONTINUED | OUTPATIENT
Start: 2018-10-16 | End: 2018-10-17

## 2018-10-16 RX ORDER — DOCUSATE SODIUM 100 MG/1
100 CAPSULE, LIQUID FILLED ORAL 2 TIMES DAILY
Status: DISCONTINUED | OUTPATIENT
Start: 2018-10-16 | End: 2018-10-19 | Stop reason: HOSPADM

## 2018-10-16 RX ORDER — CHLORHEXIDINE GLUCONATE 0.12 MG/ML
15 RINSE ORAL 2 TIMES DAILY
Status: DISCONTINUED | OUTPATIENT
Start: 2018-10-16 | End: 2018-10-17

## 2018-10-16 RX ORDER — FENTANYL CITRATE 50 UG/ML
50 INJECTION, SOLUTION INTRAMUSCULAR; INTRAVENOUS ONCE
Status: DISCONTINUED | OUTPATIENT
Start: 2018-10-16 | End: 2018-10-19 | Stop reason: HOSPADM

## 2018-10-16 RX ORDER — ATORVASTATIN CALCIUM 80 MG/1
80 TABLET, FILM COATED ORAL
Status: DISCONTINUED | OUTPATIENT
Start: 2018-10-16 | End: 2018-10-19 | Stop reason: HOSPADM

## 2018-10-16 RX ORDER — AMLODIPINE BESYLATE 10 MG/1
10 TABLET ORAL DAILY
Status: DISCONTINUED | OUTPATIENT
Start: 2018-10-16 | End: 2018-10-19 | Stop reason: HOSPADM

## 2018-10-16 RX ORDER — CITALOPRAM 20 MG/1
40 TABLET ORAL DAILY
Status: DISCONTINUED | OUTPATIENT
Start: 2018-10-16 | End: 2018-10-19 | Stop reason: HOSPADM

## 2018-10-16 RX ORDER — BISACODYL 10 MG
10 SUPPOSITORY, RECTAL RECTAL DAILY PRN
Status: DISCONTINUED | OUTPATIENT
Start: 2018-10-16 | End: 2018-10-19 | Stop reason: HOSPADM

## 2018-10-16 RX ORDER — FUROSEMIDE 10 MG/ML
40 INJECTION INTRAMUSCULAR; INTRAVENOUS ONCE
Status: COMPLETED | OUTPATIENT
Start: 2018-10-16 | End: 2018-10-16

## 2018-10-16 RX ORDER — ACETAMINOPHEN 650 MG/1
650 SUPPOSITORY RECTAL EVERY 4 HOURS PRN
Status: DISCONTINUED | OUTPATIENT
Start: 2018-10-16 | End: 2018-10-17

## 2018-10-16 RX ORDER — LIDOCAINE HYDROCHLORIDE 10 MG/ML
INJECTION, SOLUTION INFILTRATION; PERINEURAL CODE/TRAUMA/SEDATION MEDICATION
Status: COMPLETED | OUTPATIENT
Start: 2018-10-16 | End: 2018-10-16

## 2018-10-16 RX ORDER — POTASSIUM CHLORIDE 14.9 MG/ML
20 INJECTION INTRAVENOUS
Status: COMPLETED | OUTPATIENT
Start: 2018-10-16 | End: 2018-10-17

## 2018-10-16 RX ORDER — PROPOFOL 10 MG/ML
INJECTION, EMULSION INTRAVENOUS AS NEEDED
Status: DISCONTINUED | OUTPATIENT
Start: 2018-10-16 | End: 2018-10-16 | Stop reason: SURG

## 2018-10-16 RX ORDER — MIDAZOLAM HYDROCHLORIDE 1 MG/ML
INJECTION INTRAMUSCULAR; INTRAVENOUS AS NEEDED
Status: DISCONTINUED | OUTPATIENT
Start: 2018-10-16 | End: 2018-10-16 | Stop reason: SURG

## 2018-10-16 RX ORDER — CLOPIDOGREL BISULFATE 75 MG/1
75 TABLET ORAL DAILY
Status: DISCONTINUED | OUTPATIENT
Start: 2018-10-16 | End: 2018-10-19 | Stop reason: HOSPADM

## 2018-10-16 RX ORDER — PANTOPRAZOLE SODIUM 40 MG/1
40 TABLET, DELAYED RELEASE ORAL
Status: DISCONTINUED | OUTPATIENT
Start: 2018-10-16 | End: 2018-10-19 | Stop reason: HOSPADM

## 2018-10-16 RX ORDER — POTASSIUM CHLORIDE 14.9 MG/ML
20 INJECTION INTRAVENOUS
Status: DISCONTINUED | OUTPATIENT
Start: 2018-10-16 | End: 2018-10-17

## 2018-10-16 RX ADMIN — MUPIROCIN 1 APPLICATION: 20 OINTMENT TOPICAL at 18:41

## 2018-10-16 RX ADMIN — SODIUM CHLORIDE: 0.9 INJECTION, SOLUTION INTRAVENOUS at 16:22

## 2018-10-16 RX ADMIN — HEPARIN SODIUM 16000 UNITS: 1000 INJECTION INTRAVENOUS; SUBCUTANEOUS at 10:22

## 2018-10-16 RX ADMIN — FENTANYL CITRATE 150 MCG: 50 INJECTION, SOLUTION INTRAMUSCULAR; INTRAVENOUS at 10:51

## 2018-10-16 RX ADMIN — GENTAMICIN SULFATE 80 MG: 40 INJECTION, SOLUTION INTRAMUSCULAR; INTRAVENOUS at 17:31

## 2018-10-16 RX ADMIN — MIDAZOLAM 2 MG: 1 INJECTION INTRAMUSCULAR; INTRAVENOUS at 09:29

## 2018-10-16 RX ADMIN — FENTANYL CITRATE 50 MCG: 50 INJECTION, SOLUTION INTRAMUSCULAR; INTRAVENOUS at 09:32

## 2018-10-16 RX ADMIN — CHLORHEXIDINE GLUCONATE 15 ML: 1.2 RINSE ORAL at 18:41

## 2018-10-16 RX ADMIN — CHLORHEXIDINE GLUCONATE 15 ML: 1.2 RINSE ORAL at 08:31

## 2018-10-16 RX ADMIN — PHENYLEPHRINE HYDROCHLORIDE 50 MCG/MIN: 10 INJECTION INTRAVENOUS at 16:22

## 2018-10-16 RX ADMIN — SODIUM CHLORIDE, SODIUM GLUCONATE, SODIUM ACETATE, POTASSIUM CHLORIDE, MAGNESIUM CHLORIDE, SODIUM PHOSPHATE, DIBASIC, AND POTASSIUM PHOSPHATE 50 ML/HR: .53; .5; .37; .037; .03; .012; .00082 INJECTION, SOLUTION INTRAVENOUS at 11:15

## 2018-10-16 RX ADMIN — MUPIROCIN 1 APPLICATION: 20 OINTMENT TOPICAL at 08:42

## 2018-10-16 RX ADMIN — PROPOFOL 150 MG: 10 INJECTION, EMULSION INTRAVENOUS at 09:41

## 2018-10-16 RX ADMIN — METOPROLOL TARTRATE 12.5 MG: 25 TABLET ORAL at 08:30

## 2018-10-16 RX ADMIN — PROPOFOL 30 MG: 10 INJECTION, EMULSION INTRAVENOUS at 16:31

## 2018-10-16 RX ADMIN — SODIUM CHLORIDE 4 MG/HR: 0.9 INJECTION, SOLUTION INTRAVENOUS at 23:09

## 2018-10-16 RX ADMIN — DEXMEDETOMIDINE HYDROCHLORIDE 0.2 MCG/KG/HR: 100 INJECTION, SOLUTION INTRAVENOUS at 14:09

## 2018-10-16 RX ADMIN — IOHEXOL 26 ML: 350 INJECTION, SOLUTION INTRAVENOUS at 10:57

## 2018-10-16 RX ADMIN — MUPIROCIN 1 APPLICATION: 20 OINTMENT TOPICAL at 22:02

## 2018-10-16 RX ADMIN — PROTAMINE SULFATE 130 MG: 10 INJECTION, SOLUTION INTRAVENOUS at 10:39

## 2018-10-16 RX ADMIN — MIDAZOLAM 2 MG: 1 INJECTION INTRAMUSCULAR; INTRAVENOUS at 09:24

## 2018-10-16 RX ADMIN — CEFAZOLIN 2000 MG: 1 INJECTION, POWDER, FOR SOLUTION INTRAVENOUS at 16:31

## 2018-10-16 RX ADMIN — POTASSIUM CHLORIDE 40 MEQ: 400 INJECTION, SOLUTION INTRAVENOUS at 22:02

## 2018-10-16 RX ADMIN — PROPOFOL 20 MG: 10 INJECTION, EMULSION INTRAVENOUS at 16:22

## 2018-10-16 RX ADMIN — ROCURONIUM BROMIDE 50 MG: 10 INJECTION INTRAVENOUS at 09:41

## 2018-10-16 RX ADMIN — CEFAZOLIN SODIUM 2000 MG: 2 SOLUTION INTRAVENOUS at 18:39

## 2018-10-16 RX ADMIN — FENTANYL CITRATE 50 MCG: 50 INJECTION INTRAMUSCULAR; INTRAVENOUS at 20:36

## 2018-10-16 RX ADMIN — FENTANYL CITRATE 50 MCG: 50 INJECTION, SOLUTION INTRAMUSCULAR; INTRAVENOUS at 09:41

## 2018-10-16 RX ADMIN — LIDOCAINE HYDROCHLORIDE 20 ML: 10 INJECTION, SOLUTION INFILTRATION; PERINEURAL at 16:57

## 2018-10-16 RX ADMIN — CEFAZOLIN SODIUM 2000 MG: 2 SOLUTION INTRAVENOUS at 10:08

## 2018-10-16 RX ADMIN — POTASSIUM CHLORIDE 20 MEQ: 200 INJECTION, SOLUTION INTRAVENOUS at 12:39

## 2018-10-16 RX ADMIN — PROPOFOL 20 MG: 10 INJECTION, EMULSION INTRAVENOUS at 16:28

## 2018-10-16 RX ADMIN — PROPOFOL 150 MG: 10 INJECTION, EMULSION INTRAVENOUS at 09:46

## 2018-10-16 RX ADMIN — FUROSEMIDE 40 MG: 10 INJECTION, SOLUTION INTRAMUSCULAR; INTRAVENOUS at 18:41

## 2018-10-16 NOTE — ANESTHESIA POSTPROCEDURE EVALUATION
Post-Op Assessment Note      CV Status:  Unstable (POst op CHB/Assystole)    Mental Status:  Alert and anxious    Hydration Status:  Stable    PONV Controlled:  None  Airway: intubated    Post Op Vitals Reviewed: Yes          Staff: Anesthesiologist           BP   113/78   Temp  98   Pulse  80 paced   Resp   14   SpO2   99   With Pacing wire in place    For EP lab for PPM

## 2018-10-16 NOTE — PROGRESS NOTES
Patient bradycardic to asystole  Chest compressions initiated for 30 seconds  0 5mg of atropine given  Dr Alfredo Rubin and PAULETTE Gan present  Abelino started at 48 mcgs  ROSC achieved at 1449  Transvenous pacer inserted by Dr Jairon Mckeon  EP consulted; patient to receive permanent pacemaker

## 2018-10-16 NOTE — ANESTHESIA PREPROCEDURE EVALUATION
Review of Systems/Medical History  Patient summary reviewed  Chart reviewed      Cardiovascular  EKG reviewed, Exercise tolerance (METS): <4,  Hyperlipidemia, Hypertension controlled, CAD , Dysrhythmias , CHF , NYHA Classification: II compensated CHF,    Pulmonary       GI/Hepatic    GERD well controlled,             Endo/Other     GYN       Hematology   Musculoskeletal       Neurology   Psychology   Anxiety,              Physical Exam    Airway  Comment: Intubated in situ, on ventilator           Dental       Cardiovascular      Pulmonary      Other Findings        Anesthesia Plan  ASA Score- 4 Emergent    Anesthesia Type- general with ASA Monitors  Additional Monitors:   Airway Plan:     Comment: Pt intubated, on ventilator  Emergent procedure secondary to 3rd degree heart block  Plan Factors-    Induction- inhalational     Postoperative Plan-     Informed Consent-   I personally reviewed this patient with the CRNA  Discussed and agreed on the Anesthesia Plan with the CRNA  Panda Diaz

## 2018-10-16 NOTE — ANESTHESIA PREPROCEDURE EVALUATION
Review of Systems/Medical History  Patient summary reviewed  Chart reviewed      Cardiovascular  EKG reviewed, Exercise tolerance (METS): <4,  Hyperlipidemia, Hypertension controlled, CHF , NYHA Classification: II compensated CHF,    Pulmonary       GI/Hepatic    GERD well controlled,             Endo/Other     GYN       Hematology   Musculoskeletal       Neurology   Psychology   Anxiety,              Physical Exam    Airway    Mallampati score: II  TM Distance: >3 FB  Neck ROM: full     Dental       Cardiovascular  Rhythm: regular, Rate: normal,     Pulmonary  Pulmonary exam normal Breath sounds clear to auscultation,     Other Findings        Anesthesia Plan  ASA Score- 4     Anesthesia Type- general with ASA Monitors  Additional Monitors: arterial line and central venous line  Airway Plan: ETT  Plan Factors-    Induction- intravenous  Postoperative Plan- Plan for postoperative opioid use  Planned trial extubation    Informed Consent- Anesthetic plan and risks discussed with patient and sibling

## 2018-10-16 NOTE — PROGRESS NOTES
Patient had bradycardia which evolved into asystole requiring CPR  Vasopressors initiated secondary to hypotension and bradycardia  Patient had return of spontaneous circulation within 30 seconds after receiving compressions as well as atropine  Heart rate was 30s  CT surgery at bedside  Critical care medicine placed emergent transvenous pacer through right IJ Cordis  Capture obtained at 15 milliamps  EP cardiology contacted plan for pacemaker placement today  Maintain pacing at 20 milliamps at 80 beats per minute until EP available to place permanent pacemaker      Critical care time 32 minutes

## 2018-10-16 NOTE — ANESTHESIA PROCEDURE NOTES
Procedure Performed: MICHAEL Anesthesia  Start Time: 10/16/2018 10:00 AM  End Time:        Preanesthesia Checklist    Patient identified, IV assessed, risks and benefits discussed, monitors and equipment assessed, procedure being performed at surgeon's request and anesthesia consent obtained  Procedure    Type of MICHAEL: interventional MICHAEL with real time guidance of intracardiac procedure  Images Saved: ultrasound permanent image saved  Physician Requesting Echo: Dennis Chavez  Location performed: OR  Intubated  Heart visualized  Insertion of MICHAEL Probe:  Easy  Probe Type:  Epiaortic and multiplane  Modalities:  Color flow mapping, 3D, pulse wave Doppler and continuous wave Doppler  Echocardiographic and Doppler Measurements    PREPROCEDURE    LEFT VENTRICLE:  Systolic Function: normal  Ejection Fraction: 50-55%  Cavity size: normal      RIGHT VENTRICLE:  Systolic Function: normal               AORTIC VALVE:  Leaflets: trileaflet  Leaflet motions restricted  Stenosis: severe  Mean Gradient: 27 mmHg  Peak Gradient: 52 mmHg  Area: 0 65 cm²  Regurgitation: moderate  Pressure Half-time: 440 ms  MITRAL VALVE:  Leaflets: calcified  Leaflet Motions: restricted  Regurgitation: mild  Stenosis: none  TRICUSPID VALVE:  Leaflets: normal  Leaflet Motions: normal  Stenosis: none  Regurgitation: mild  PULMONIC VALVE:  Leaflets: normal  Regurgitation: trace  Stenosis: none  ASCENDING AORTA:  Size:  normal  Dissection not present  AORTIC ARCH:  Size:  normal  dissection not present  Grade 3: atheroma protruding < 0 5 cm into lumen  DESCENDING AORTA:  Size: normal  Dissection not present  Grade 3: atheroma protruding < 0 5 cm into lumen          RIGHT ATRIUM:  Size:  normal  No spontaneous echo contrast     LEFT ATRIUM:  Size: normal  No spontaneous echo contrast     LEFT ATRIAL APPENDAGE:  Size: normal  No spontaneous echo contrast         ATRIAL SEPTUM:  Intra-atrial septal morphology: normal  VENTRICULAR SEPTUM:  Intra-ventricular septum morphology: normal              OTHER FINDINGS:  Pericardium:  normal  Pleural Effusion:  none  POSTPROCEDURE    LEFT VENTRICLE: Unchanged   RIGHT VENTRICLE: Unchanged   AORTIC VALVE:   Leaflets: bioprosthetic  Stenosis: none  Mean Gradient: 6 mmHg  Regurgitation: none  Valve Size: 23 mm  MITRAL VALVE: Unchanged   TRICUSPID VALVE: Unchanged   PULMONIC VALVE: Unchanged           ATRIA: Unchanged   AORTA: Unchanged   REMOVAL:  Probe Removal: atraumatic

## 2018-10-16 NOTE — CONSULTS
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 and a calcific main pulmonary artery, deeming her not a candidate for open surgical AVR  Her AS was found to be worsening after a recent hospitalization in August 2018 for acute on chronic CHF exacerbation; BNP 1300 and CXR with pulmonary vascular congestion  She was treated with aggressive diuresis at the time  She developed a pericardial effusion in 2011 and had been following with a cardiologist and getting serial echos and at that time her AS had remained mild to moderate, and was found to have progressed to severe during her last admission  She had a lapse in cardiac care after moving from [facility]  After her recent hospitalization she was referred to CT surgery who began pre-op TAVR work-up  She presents to the ICU today s/p TF TAVR with 23mm valve  Post-op MICHAEL demonstrated EF 50-55%  She received no blood products intra-operatively and arrived on 2mg Cardene  PMH: Hodgkin's lymphoma at age 7, CHF, hyperlipidemia, hypertension, anxiety, pericardial effusion status post pericardial window 2011    History obtained from chart review due to patient being intubated and sedated       PMH:   Past Medical History:   Diagnosis Date    Anxiety     [facility] [address] [facility]     CHF [facility]congestive [facility] failure) [facility] [address] [facility]     Hodgkin's disease [facility] [address] [facility]     Hyperlipidemia     Hypertension        PSH:   Past Surgical History:   Procedure Laterality Date    CARDIAC SURGERY      COLON SURGERY      non cancerous tumor    HYSTERECTOMY      NECK SURGERY      TUMOR REMOVAL         Family History:   Family History Problem Relation Age of Onset    Hyperlipidemia Mother     Hypertension Mother     Diabetes Mother        Social History:   History   Smoking Status    Never Smoker   Smokeless Tobacco    Never Used      History   Alcohol Use No      Marital Status: /Civil Union    ROS: ROS unable to be obtained secondary to intubation and sedation  Allergies: No Known Allergies    Home Medications:   Prior to Admission medications    Medication Sig Start Date End Date Taking? Authorizing Provider   amLODIPine (NORVASC) 10 mg tablet Take 10 mg by mouth daily   6/12/18  Yes Historical Provider, MD   aspirin (ECOTRIN LOW STRENGTH) 81 mg EC tablet Take 81 mg by mouth daily   Yes Historical Provider, MD   atorvastatin (LIPITOR) 80 mg tablet Take 80 mg by mouth daily   6/12/18  Yes Historical Provider, MD   citalopram (CeleXA) 40 mg tablet Take 40 mg by mouth daily   6/12/18  Yes Historical Provider, MD   furosemide (LASIX) 40 mg tablet Take 40 mg by mouth daily   Yes Historical Provider, MD   multivitamin (THERAGRAN) TABS Take 1 tablet by mouth daily   Yes Historical Provider, MD   mupirocin (BACTROBAN) 2 % ointment Apply topically 2 (two) times a day Apply inside both nostrils two times per day  Start 5 days prior to surgery   [facility]20/18  [facility]   pantoprazole (PROTONIX) 40 mg tablet Take 40 mg by mouth daily   6/12/18  Yes Historical Provider, MD   furosemide (LASIX) 40 mg tablet Take 1 tablet (40 mg total) by mouth daily for 30 days 8/3/18 10/16/18 Yes Trinity Nesbitt DO       Inpatient Medications:  Scheduled Meds:    Current Facility-Administered Medications:  acetaminophen 650 mg Rectal Q4H PRN Shaji Gaitan PA-C    acetaminophen 650 mg Oral Q4H PRN Ruben Friend PA-C    amLODIPine 10 mg Oral Daily Ruben Friend PA-C    aspirin 81 mg Oral Daily Ruben Friend PA-C    atorvastatin 80 mg Oral Daily With BHAVIK Neumann    bisacodyl 10 mg Rectal Daily PRN Marietta Mckeon BHAVIK Friend    calcium chloride 1 g Intravenous Once Opal Heck PA-C    cefazolin 2,000 mg Intravenous Q8H Ruben Friend PA-C    chlorhexidine 15 mL Swish & Spit BID Ruben Friend PA-C    citalopram 40 mg Oral Daily Ruben Friend PA-C    clopidogrel 75 mg Oral Daily Ruben Friend PA-C    dexmedetomidine 0 1-0 7 mcg/kg/hr Intravenous Titrated Meg Bradley V, CRNP    fentanyl citrate (PF) 50 mcg Intravenous Once Opal Heck PA-C    fentanyl citrate (PF) 50 mcg Intravenous Q1H PRN Opal Heck PA-C    [START ON 10/17/2018] heparin (porcine) 5,000 Units Subcutaneous Q8H [facility] Ruben Friend PA-C    multi-electrolyte 50 mL/hr Intravenous Continuous Opal Heck PA-C Last Rate: 50 mL/hr (10/16/18 1115)   mupirocin 1 application Nasal A79F [facility] Ruben Friend PA-C    niCARdipine 2 5-15 mg/hr Intravenous Titrated Opal Heck PA-C Last Rate: 6 mg/hr (10/16/18 1345)   ondansetron 4 mg Intravenous Q6H PRN Ruben Friend PA-C    pantoprazole 40 mg Oral Early Morning Ruben Friend PA-C    polyethylene glycol 17 g Oral Daily Ruben Friend PA-C    potassium chloride 20 mEq Intravenous Once PRN Opal Heck PA-C Last Rate: 20 mEq (10/16/18 1239)   potassium chloride 20 mEq Intravenous Q1H PRN Ruben Friend PA-C    potassium chloride 20 mEq Intravenous Q30 Min PRN Ruben Friend PA-C      Continuous Infusions:    dexmedetomidine 0 1-0 7 mcg/kg/hr    multi-electrolyte 50 mL/hr Last Rate: 50 mL/hr (10/16/18 1115)   niCARdipine 2 5-15 mg/hr Last Rate: 6 mg/hr (10/16/18 1345)     PRN Meds:    acetaminophen 650 mg Q4H PRN   acetaminophen 650 mg Q4H PRN   bisacodyl 10 mg Daily PRN   fentanyl citrate (PF) 50 mcg Q1H PRN   ondansetron 4 mg Q6H PRN   potassium chloride 20 mEq Once PRN   potassium chloride 20 mEq Q1H PRN   potassium chloride 20 mEq Q30 Min PRN       VTE Pharmacologic Prophylaxis: Heparin  VTE Mechanical Prophylaxis: sequential compression device    Invasive lines and devices: Invasive Devices     Central Venous Catheter Line            CVC Central Lines 10/16/18 Triple less than 1 day          Arterial Line            Arterial Line 10/16/18 Right Radial less than 1 day          Drain            Urethral Catheter Temperature probe;Non-latex 16 Fr  less than 1 day          Airway            ETT  Hi-Lo 7 mm less than 1 day                Vitals:   Vitals:    10/16/18 1215 10/16/18 1245 10/16/18 1300 10/16/18 1315   BP:       Pulse: 60 62 62 64   Resp: 20 17 17 17   Temp: (!) 96 8 °F (36 °C) (!) 96 8 °F (36 °C) (!) 97 °F (36 1 °C) (!) 97 2 °F (36 2 °C)   TempSrc: Probe Probe Probe Probe   SpO2: 97% 93% 92% 94%   Weight:       Height:         Arterial Line BP: 122/56  Arterial Line MAP (mmHg): 80 mmHg    Temperature: Temp (24hrs), Av 9 °F (36 1 °C), Min:96 6 °F (35 9 °C), Max:97 8 °F (36 6 °C)  Current: Temperature: (!) 97 2 °F (36 2 °C)    Weights: IBW: 57 kg  Body mass index is 30 12 kg/m²  Hemodynamic Monitoring:  N/A    Ventilator Settings:   Vent Mode: CPAP/PS Spont  Resp Rate (BPM): 14 BPM  Vt (mL): 400 mL  FIO2 (%): 70 %  PEEP (cmH2O): 5 cmH2O  SpO2: 94 %    Physical Exam:    Constitutional: well developed  HENT: normocephalic, atraumatic  Intubated  Neck: supple  +CVC  Cardiovascular: regular rate and rhythm  no murmur heard  Exam reveals no rub  Pulmonary/Chest: Breath sounds diminished bilaterally  Abdominal: bowel sounds hypoactive  no distension  Musculoskeletal: Trace edema  Neurological: Unable to assess due to sedation  Skin: Skin is warm, dry, intact  Psychiatric: Unable to assess due to sedation      Labs/Imaging:     Results from last 7 days  Lab Units 10/16/18  1117 10/11/18  0959 10/11/18  0927   WBC Thousand/uL  --   --  10 51*   HEMOGLOBIN g/dL 10 5*  --  13 6   I STAT HEMOGLOBIN g/dl  --  12 2  --    HEMATOCRIT % 33 4* 36 42 6   PLATELETS Thousands/uL 349  --  447*       Results from last 7 days  Lab Units 10/16/18  1117 10/11/18  0959 10/11/18  0927   SODIUM mmol/L 137  --  138   POTASSIUM mmol/L 3 1*  --  3 3*   CHLORIDE mmol/L 103  --  98*   CO2 mmol/L 27  --  38*   BUN mg/dL 12  --  14   CREATININE mg/dL 0 75  --  0 87   CALCIUM mg/dL 7 3*  --  8 4   ALK PHOS U/L  --   --  124*   ALT U/L  --   --  26   AST U/L  --   --  20   GLUCOSE, ISTAT mg/dl  --  111  --            Results from last 7 days  Lab Units 10/11/18  0927   HEMOGLOBIN A1C % 6 8*       Post-op AB 5%  Post-op CXR: ETT at level of leonard will retract 2cm  RIJ cordis in position  RIJ TLC most likely terminates in RA  Moderate pulmonary vascular congestion  I have personally reviewed pertinent films in PACS  Post-op EKG: NSR  Prolonged QTC at 508  ST depressions II, III, aVF  V3-V6  Present on pre-op EKG in August but deeper inversion noted now, especially within leads V3-V6  This was personally reviewed by myself  Impression:  Principal Problem: Aortic stenosis, severe  Active Problems:    S/p TAVR (transcatheter aortic valve replacement), bioprosthetic    Acute postoperative pulmonary insufficiency (HCC)    HLD (hyperlipidemia)    Hypertension    CHF (congestive heart failure) (HCC)    Coronary artery disease involving native coronary artery of native heart    Anxiety    History of Hodgkin's lymphoma    S/P pericardial window creation      Plan:    Neuro: D/C continuous sedation  PRN dilaudid and percocet for pain  Trend neuro exam  Regulate sleep/wake cycle  Delirium precautions  CV: MAP goal >65  SBP goal <130  CI>2 2  Post-op medications: Cardene, 2 mg/hour  Wean Cardene as able  Volume resuscitation as needed  Monitor rhythm on telemetry  Epicardial pacing wires in place  Will pace as needed for bradycardia or heart block  Intra-op MICHAEL LVEF 50-55%  Lung: Check STAT post-op ABG and CXR  Wean vent with spontaneous breathing trial with goal to extubate today  GI: GI prophylaxis with PPI  Bowel regimen  Zofran PRN for nausea  FEN: NPO  Replete K >4 0, Mag >2 0 and calcium >7 0  : Check STAT post-op BMP  Rogers in place  Monitor UOP with goal >0 5cc/kg/hour  Lasix prn  Volume resuscitate as needed  ID: Prophylactic post-op abx  Maintain normothermia  Tylenol PRN for fevers  Heme: Check STAT post-op H/H and platelets  Monitor incision site, invasive lines, and chest tube outputs for bleeding  Send coag panel if needed  Endo: Insulin gtt for blood sugar <180  Disposition: ICU Care    Counseling / Coordination of Care  Total Critical Care time spent 0 minutes excluding procedures, teaching and family updates        SIGNATURE: PAULETTE Cooper  DATE: October 16, 2018  TIME: 2:08 PM

## 2018-10-16 NOTE — ANESTHESIA PROCEDURE NOTES
Central Line Insertion  Performed by: John Garrett by: Kingston Alford   Date/Time: 10/16/2018 9:46 AM  Catheter Type:  single lumen  Consent: Verbal consent obtained  Written consent obtained  Risks and benefits: risks, benefits and alternatives were discussed  Consent given by: patient  Patient understanding: patient states understanding of the procedure being performed  Patient consent: the patient's understanding of the procedure matches consent given  Procedure consent: procedure consent matches procedure scheduled  Relevant documents: relevant documents present and verified  Test results: test results available and properly labeled  Site marked: the operative site was marked  Radiology Images: Radiology Images displayed and confirmed  If images not available, report reviewed  Required items: required blood products, implants, devices, and special equipment available  Patient identity confirmed: verbally with patient and arm band  Time out: Immediately prior to procedure a "time out" was called to verify the correct patient, procedure, equipment, support staff and site/side marked as required    Indications: vascular access  Location details: right internal jugular  Catheter size: 9 Fr  Patient position: Trendelenburg  Preparation: skin prepped with ChloraPrep  Skin prep agent dried: skin prep agent completely dried prior to procedure  Sterile barriers: all five maximum sterile barriers used - cap, mask, sterile gown, sterile gloves, and large sterile sheet  Hand hygiene: hand hygiene performed prior to central venous catheter insertion  Ultrasound guidance: yes  sterile gel and probe cover used in ultrasound-guided central venous catheter insertionultrasound permanent image saved  Pre-procedure: landmarks identified  Number of attempts: 1  Successful placement: yes  Post-procedure: chlorhexidine patch applied and dressing applied  Patient tolerance: Patient tolerated the procedure well with no immediate complications

## 2018-10-16 NOTE — H&P (VIEW-ONLY)
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, making her not a suitable candidate for surgical AVR  Chloe Zamudio now presents to review the results of these tests and obtain a second surgeon to confirm the suitability of proceeding with transcatheter aortic valve replacment  The patient reports feeling well since her last visit  She states she used to have a stabbing chest pain, but that has been alleviated since she had her cardiac cath performed  She denies shortness of breath, LE edema, syncope, palpitations, PND or orthopnea  She is scheduled to have a visit with her dentist on October 9th  She has a past medical history of Hodgkin's Lymphoma with chemotherapy and radiation to the chest wall at age 7, history of pericardial effusion s/p pericardial window in 2011 at [facility] with post-operative complications necessitating an increased ICU length of stay, chronic diastolic heart failure, HTN, HLD, and anxiety  She was referred to our practice to discuss her treatment options for her known aortic stenosis that was recently found to be worsening after a hospitalization in August 2018 for acute on chronic CHF exacerbation  She had presented with complaints of SOB and LE edema  BNP >1300, CXR showed vascular congestion, EKG (-) for ischemic changes, and troponin (-)  Symptoms resolved after aggressive diuresis   TTE showed moderate-severe aortic stenosis with EF of 50-55%  Patient has followed with cardiologists since discovery and treatment of her pericardial effusion in 2011, seeing them every 6 months with echo  Aortic stenosis had remained mild-moderate during that time  Valvular function has markedly decreased in one years time, since her move from [facility] and subsequent lapse in cardiac care  Patient has no significant family history for heart disease  She ambulates without assistive device and performs ADLs independently  She does not see a dentist regularly  Past Medical History:  Past Medical History:   Diagnosis Date    Anxiety     [facility]     [facility]est[facility]     Hodgkin's disease ([facility] )     Hyperlipidemia     Hypertension          Past Surgical History:   Past Surgical History:   Procedure Laterality Date    CARDIAC SURGERY      COLON SURGERY      non cancerous tumor    HYSTERECTOMY      NECK SURGERY      TUMOR REMOVAL           Family History:  Family History   Problem Relation Age of Onset    Hyperlipidemia Mother     Hypertension Mother     Diabetes Mother          Social History:    History   Alcohol Use No     History   Drug Use No     History   Smoking Status    Never Smoker   Smokeless Tobacco    Never Used       Home Medications:   Prior to Admission medications    Medication Sig Start Date End Date Taking?  Authorizing Provider   amLODIPine (NORVASC) 10 mg tablet Take 10 mg by mouth daily   6/12/18  Yes Historical Provider, MD   aspirin (ECOTRIN LOW STRENGTH) 81 mg EC tablet Take 81 mg by mouth daily   Yes Historical Provider, MD   atorvastatin (LIPITOR) 80 mg tablet Take 80 mg by mouth daily   6/12/18  Yes Historical Provider, MD   citalopram (CeleXA) 40 mg tablet Take 40 mg by mouth daily   6/12/18  Yes Historical Provider, MD   multivitamin (THERAGRAN) TABS Take 1 tablet by mouth daily   Yes Historical Provider, MD   pantoprazole (PROTONIX) 40 mg tablet Take 40 mg by mouth daily   6/12/18  Yes Historical Provider, MD   furosemide (LASIX) 40 mg tablet Take 1 tablet (40 mg total) by mouth daily for 30 days 8/3/18 9/4/18  DO kristofer Cage [facility]) 2 % ointment Apply topically 2 (two) times a day Apply inside both nostrils two times per day  Start 5 days prior to surgery  9/20/18   Clem Benavides PA-C       Allergies:  No Known Allergies    Review of Systems:  Review of Systems   Constitutional: Negative for activity change, appetite change, chills, diaphoresis, fatigue, fever and unexpected weight change  HENT: Negative for congestion, dental problem, facial swelling, hearing loss, nosebleeds, rhinorrhea, sinus pain, sinus pressure, sore throat and trouble swallowing  Eyes: Negative  Respiratory: Negative for cough, choking, chest tightness, shortness of breath and wheezing  Cardiovascular: Negative for chest pain, palpitations and leg swelling  Gastrointestinal: Negative for abdominal distention, abdominal pain, blood in stool, constipation, diarrhea and nausea  Endocrine: Negative  Genitourinary: Negative for difficulty urinating, dysuria, frequency and urgency  Musculoskeletal: Negative  Allergic/Immunologic: Negative  Neurological: Negative for dizziness, seizures, syncope, weakness, light-headedness, numbness and headaches  Hematological: Negative for adenopathy  Does not bruise/bleed easily  Psychiatric/Behavioral: Negative  All other systems reviewed and are negative  Vital Signs:     Vitals:    09/20/18 1100 09/20/18 1119   BP: 136/78 140/62   BP Location: Left arm Right arm   Cuff Size: Adult    Pulse: 80    Resp: 14    Temp: 98 2 °F (36 8 °C)    TempSrc: Oral    SpO2: 93%    Weight: 80 3 kg (177 lb)    Height: 5' 4" (1 626 m)        Physical Exam:  Physical Exam   Constitutional: She is oriented to person, place, and time  She appears well-developed and well-nourished  No distress     HENT:   Head: Normocephalic and atraumatic  Right Ear: External ear normal    Left Ear: External ear normal    Mouth/Throat: Oropharynx is clear and moist  No oropharyngeal exudate  Eyes: Conjunctivae and EOM are normal  Pupils are equal, round, and reactive to light  Left eye exhibits no discharge  Neck: Normal range of motion  Neck supple  No JVD present  No tracheal deviation present  Cardiovascular: Normal rate, regular rhythm and intact distal pulses  Murmur heard  Systolic murmur is present with a grade of 4/6   Pulmonary/Chest: Effort normal and breath sounds normal  No respiratory distress  She has no wheezes  She has no rales  Abdominal: Soft  Bowel sounds are normal  She exhibits no distension  There is no tenderness  There is no rebound and no guarding  Musculoskeletal: Normal range of motion  She exhibits no edema, tenderness or deformity  Neurological: She is alert and oriented to person, place, and time  She displays normal reflexes  No cranial nerve deficit  She exhibits normal muscle tone  Coordination normal    Skin: Skin is warm and dry  No rash noted  She is not diaphoretic  No erythema  No pallor  Psychiatric: She has a normal mood and affect  Her behavior is normal  Judgment and thought content normal    Nursing note and vitals reviewed  Lab Results:                 No results found for: HGBA1C  Lab Results   Component Value Date    TROPONINI 0 02 08/02/2018       Imaging Studies:     Echocardiogram: LEFT VENTRICLE:  Systolic function was at the lower limits of normal  Ejection fraction was estimated in the range of 50 % to 55 %  There were no regional wall motion abnormalities  Left ventricular diastolic function parameters were normal      RIGHT VENTRICLE:  The size was normal   Systolic function was normal      MITRAL VALVE:  There was moderate annular calcification  There was mild stenosis  There was trace regurgitation      AORTIC VALVE:  The valve was trileaflet   Leaflets exhibited increased thickness and calcification with reduced cuspal separation  Transaortic velocity was increased due to valvular stenosis  There was moderate to severe stenosis  Peak and mean AV gradients were 50 and 31 mm Hg respectively  GIL by the continuity equation method was 0 6 sq cm  There was mild regurgitation      TRICUSPID VALVE:  There was trace regurgitation  Pulmonary artery systolic pressure was at the upper limits of normal      PULMONIC VALVE:  There was mild to moderate regurgitation  Gated CTA of the chest/abdomen/pelvis: 1  TAVR measurements:    Annulus: diameter 25 7 x 16 8 mm      area: 329 2 sq mm    Annulus to LCA: 10 4 mm    Annulus to RCA: 16 2 mm    Minimal diameter right iliofemoral segment: 6 5 mm    Minimal diameter left iliofemoral segment: 6 7 mm  2  No significant aortoiliac occlusive disease  3  Mild prominence of the common duct and central intrahepatic ducts without obstructive mass identified  May be normal cholecystectomy appearance  4   Air-fluid level seen throughout the esophagus which may be indicative of motility abnormality  5   There are 3 mm noncalcified nodules in the right upper lobe and left upper lobe Based on current Fleischner Society 2017 Guidelines on incidental pulmonary nodule, no routine follow-up is needed if the patient is considered low risk for lung cancer  If the patient is considered high risk for lung cancer, 12 month follow-up non-contrast chest CT is recommended  Left and right cardiac catheterization: CORONARY CIRCULATION:  Left main: The vessel was normal sized  Angiography showed mild atherosclerosis  LAD: The vessel was normal sized  Angiography showed mild atherosclerosis  1st diagonal: The vessel was normal sized  Angiography showed mild atherosclerosis  Circumflex: The vessel was normal sized  Angiography showed mild atherosclerosis  Ramus intermedius: The vessel was normal sized   Angiography showed mild atherosclerosis  RCA: The vessel was large sized (dominant)  Angiography showed mild atherosclerosis  Right PDA: The vessel was normal sized  Angiography showed mild atherosclerosis  Right posterolateral segment: The vessel was normal sized  Angiography showed minor luminal irregularities      CARDIAC STRUCTURES:  There was severe aortic stenosis  Carotid artery ultrasound: RIGHT:  There is <50% stenosis noted in the internal carotid artery  Plaque is  heterogenous and irregular  Vertebral artery flow is antegrade  There is no significant subclavian artery  disease  LEFT:  There is <50% stenosis noted in the internal carotid artery  Plaque is  heterogenous and irregular  Vertebral artery flow is antegrade  There is no significant subclavian artery  disease  I have personally reviewed pertinent films in PACS    TAVR evaluation Assessment:     5 Meter Walk Test:      Attempt 1: 4   Attempt 2: 4   Attempt 3: 4    STS Risk Score:     Risk of mortality 2 1 %    [place]: II    KCCQ-12 was completed    Assessment:  Patient Active Problem List    Diagnosis Date Noted    Moderate to severe aortic stenosis 08/03/2018    [facility] 08/01/2018    Hypertension 08/01/2018     Severe aortic stenosis; Ongoing TAVR workup    Plan:    Leighann Montero has severe symptomatic aortic stenosis  Based on their STS risk assessment, they have undergone testing for transcatheter aortic valve replacement  The results of these studies have been interpreted by two independent cardiac surgeons who have determined the patient to be Prohibitive risk for open aortic valve replacement due to a porcelain aorta and pulmonary veins  The risks, benefits, and alternatives to TAVR were discussed in detail with the patient today  They understand and wish to proceed with transfemoral transcatheter aortic valve replacement  She will complete preoperative PFTs, ABG and laboratory studies    Informed consent was obtained and a date for the intervention has been set for October 16th       Manuela [place] was comfortable with our recommendations, and their questions were answered to their satisfaction  The following preoperative instructions were provided at the conclusion of their consultation:     1  You will receive a phone call from the hospital between 2:00 PM and 8:00 PM the day prior to surgery to confirm arrival time and location  For surgery on Mondays, you will receive a call on Friday  2  Do not drink or eat anything after midnight the night before surgery  That includes no water, candy, gum, lozenges, Lifesavers, etc  We recommend you not eat any "junk" food, consume alcohol or smoke the night before surgery  3  Continue taking aspirin but only 81 mg daily  4  If you take Coumadin and/or Plavix, discontinue it 5 days before surgery  5  If you are diabetic, do not take any of your diabetic pills the morning of surgery  If you take Lantus insulin, you may take it at your regularly scheduled time the day before surgery  Do not take any other insulins the morning of surgery  6  The 2 nights before surgery, take a shower each night using the special antiseptic soap or soap sponges you received from the office or hospital  Lei Budge your hair with regular shampoo and rinse completely before using the antiseptic sponges  Use the sponge to wash from your neck down, with special attention to the armpits and groin area  Do not use any other soap or cleanser on your skin  Do not use lotions, powder, deodorant or perfume of any kind on your skin after you shower  Use clean bed linens and wear clean pajamas after your shower  7  You will be prescribed Mupirocin nasal ointment  Apply to both nostrils twice a day for 5 days prior to surgery    8  Do not take a shower the morning of her surgery; you'll be given a special" bath" at the hospital   9  Notify the CT surgery office if you develop a cold, sore throat, cough, fever or other health issues before your surgery    10  Other medication changes included the following: Stop Multivitamin now     SIGNATURE: Luis Cartagena PA-C  DATE: September 20, 2018  TIME: 12:00 PM

## 2018-10-16 NOTE — ANESTHESIA PROCEDURE NOTES
Central Line Insertion  Performed by: Sabine Franklin by: Reagan Cordero   Date/Time: 10/16/2018 9:46 AM  Catheter Type:  triple lumen  Consent: Verbal consent obtained  Written consent obtained  Risks and benefits: risks, benefits and alternatives were discussed  Consent given by: patient  Patient understanding: patient states understanding of the procedure being performed  Patient consent: the patient's understanding of the procedure matches consent given  Procedure consent: procedure consent matches procedure scheduled  Relevant documents: relevant documents present and verified  Test results: test results available and properly labeled  Site marked: the operative site was marked  Radiology Images: Radiology Images displayed and confirmed  If images not available, report reviewed  Required items: required blood products, implants, devices, and special equipment available  Patient identity confirmed: hospital-assigned identification number and arm band  Time out: Immediately prior to procedure a "time out" was called to verify the correct patient, procedure, equipment, support staff and site/side marked as required    Indications: vascular access and central pressure monitoring  Location details: right internal jugular  Catheter size: 7 Fr  Patient position: Trendelenburg  Assessment: blood return through all ports  Preparation: skin prepped with ChloraPrep  Skin prep agent dried: skin prep agent completely dried prior to procedure  Sterile barriers: all five maximum sterile barriers used - cap, mask, sterile gown, sterile gloves, and large sterile sheet  Ultrasound guidance: yes  sterile gel and probe cover used in ultrasound-guided central venous catheter insertionultrasound permanent image saved  Pre-procedure: landmarks identified  Number of attempts: 1  Successful placement: yes  Post-procedure: dressing applied  Patient tolerance: Patient tolerated the procedure well with no immediate complications

## 2018-10-16 NOTE — ANESTHESIA POSTPROCEDURE EVALUATION
Post-Op Assessment Note      CV Status:  Stable    Mental Status:  Alert and awake    Hydration Status:  Euvolemic    PONV Controlled:  Controlled    Airway Patency:  Patent    Post Op Vitals Reviewed: Yes          Staff: CRNA           BP   140/60   Temp  98   Pulse 68   Resp   16   SpO2   100

## 2018-10-16 NOTE — OP NOTE
OPERATIVE REPORT  PATIENT NAME: Manuela Roegrs    :  1965  MRN: [id number]  Pt Location: [facility] OR ROOM 02    SURGERY DATE: 10/16/2018    SURGEON: Argenis Moore MD    CO-SURGEON: Pamila Lesches, MD    ASSISTANT: Alexandra Pabon DO    ADDITIONAL ASSISTANT: Eugenie Stanton PA-C    PREOPERATIVE DIAGNOSIS: Symptomatic severe aortic stenosis  POSTOPERATIVE DIAGNOSIS: Symptomatic severe aortic stenosis  NYHA Class: 3  CCS Class: 1    PROCEDURE:   Transcatheter aortic valve replacement with a 23 mm Luo LEODAN 3 bioprosthetic valve via left transfemoral approach  CARDIOPULMONARY BYPASS TIME: 0 minutes  ANESTHESIA Dionne Dan, general endotracheal anesthesia with transesophageal echocardiogram guidance  INDICATIONS:  The patient is a 48y o  year-old female with clinical and echocardiographic findings consistent with severe aortic stenosis  The patient was evaluated in our heart valve center and was deemed high risk for a conventional aortic valve replacement therefore we recommended the procedure described previously  FINDINGS:  1  Intraoperative transesophageal echocardiogram revealed severe aortic stenosis  2  Final transesophageal echocardiogram demonstrated prosthetic valve with normal function trace perivalvular leak  OPERATIVE TECHNIQUE:    The patient was taken to the operating room and placed supine on the operating table  Following the satisfactory induction of general anesthesia and placement of monitoring lines, the patient was prepped and draped in the usual sterile fashion  A time-out procedure was performed  The right common femoral artery and right common femoral vein were accessed percutaneously using Seldinger technique and fluoroscopy  A pigtail catheter was inserted and advanced to the right coronary cusp, an aortogram was performed to determine proper angle and orientation for valve deployment    Through the right common femoral vein sheath, a balloon-tip temporary pacing catheter was inserted and advanced into the right ventricle and its capture was confirmed  The left common femoral artery was accessed percutaneously using Seldinger technique and fluoroscopy  Two (2) Perclose sutures were deployed in the standard fashion  The patient was systemically heparinized  The left common femoral artery was then accessed, a [employer] extra-stiff wire was positioned in the ascending aorta over an exchange catheter  The sheath for the delivery system was inserted through the left common femoral artery and advanced into the aorta  A 6 [place] Nicole right 4 coronary catheter was advanced through the delivery sheath to the aortic valve  The aortic valve was crossed with a 0 035 straight-tip wire, the right coronary catheter was advanced into the left ventricular cavity, this was then exchanged for a curved tip extra stiff wire  The balloon valvuloplasty catheter was inserted through the delivery sheath and positioned in the aortic annulus  During an episode of rapid pacing, balloon valvuloplasty was performed  A 23 mm LEODAN 3 valve delivery system was advanced through the delivery sheath into the aorta, the delivery system was configured for deployment  The valve on the delivery system was then advanced and the aortic valve was crossed  At this point, the catheter was desheathed in the standard fashion  The valve was positioned appropriately using a combination of fluoroscopy and transesophageal echocardiogram guidance  During an episode of rapid pacing, balloon deployment of the valve was performed  Following deployment, the position of the valve was confirmed by fluoroscopy and echocardiography and its position appeared appropriate with trace perivalvular leak  The valve delivery system was subsequently removed followed by removal of the sheath while the Perclose sutures were secured and direct pressure was held   Protamine was administered with normalization of the ACT  Pressure was released, without evidence of active bleeding  The right common femoral artery sheath was removed followed by deployment of a closure device  The right common femoral vein sheath was removed and pressure was held  COMPLICATIONS: none    PACKS/TUBES/DRAINS: none  EBL: 30 cc  TRANSFUSION: none  SPECIMENS: None      As the attending surgeon, I was present and scrubbed for all critical portions of this procedure  There was no qualified surgical resident available  Sponge, needle and instrument counts were reported as correct by the nursing staff       SIGNATURE: Ivanna Tejeda MD  DATE: October 16, 2018  TIME: 11:36 AM

## 2018-10-16 NOTE — PROGRESS NOTES
Patient became acutely bradycardic initially in the 40s and 50s and then progressed to asystole  CPR initiated  Patient woke during CPR and compressions stopped at 1449  In CHB when ROSC achieved with HR 30s  0 5mg Atropine given x 2 with no improvement  Abelino initiated for profound hypotension  Stat Isuprel gtt ordered  Dr Jennifer George at bedside during event  Dr Luis Alberto Gandara called to bedside and inserted transvenous pacing wire via previously placed RIJ cordis  Ventricular capture obtain at mA 15, rate set to 80   EP contacted, Dr Thi Plata to urgently place PPM     Critical Care Time: 30 minutes

## 2018-10-16 NOTE — CONSULTS
Consultation - Electrophysiology-Cardiology (EP)   Lian Young 48 y o  female MRN: [id number]  Unit/Bed#: [facility] 418-01 Encounter: [id number]      Consults    Assessment/Plan   1  Severe AS    * 2/2 radiation from Hodgkin's lymphoma    * now POD#0 s/p TAVR    * remains intubated     2  CHB    * currently in CHB w/ wide escape w/ RBBB pattern    * s/p TVP with HR in the 40's    * plan for urgent DC PPM now as her EF is 50-55% on intra op MICHAEL   * attending discussed risks vs benefits w/ family due to patients intubation    * pre op ancef    * left sided device    * no allergies to contrast dye         History of Present Illness   Physician Requesting Consult: Natalia Hall MD  Reason for Consult / Principal Problem: CHB    HPI: Lian Young is a 48y o  year old female with a history of Hodkin's lymphoma s/p radiation, severe AS 2/2 radiation, 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 and POD#0 s/p TAVR  EP is being consulted for CHB  Hx obtained From H&P due to patient's intubation:   "  Lian Young now presents to review the results of these tests and obtain a second surgeon to confirm the suitability of proceeding with transcatheter aortic valve replacment  The patient reports feeling well since her last visit  She states she used to have a stabbing chest pain, but that has been alleviated since she had her cardiac cath performed  She denies shortness of breath, LE edema, syncope, palpitations, PND or orthopnea      She is scheduled to have a visit with her dentist on October 9th      She has a past medical history of Hodgkin's Lymphoma with chemotherapy and radiation to the chest wall at age 7, history of pericardial effusion s/p pericardial window in 2011 at [facility] with post-operative complications necessitating an increased ICU length of stay, chronic diastolic heart failure, HTN, HLD, and anxiety  She was referred to our practice to discuss her treatment options for her known aortic stenosis that was recently found to be worsening after a hospitalization in August 2018 for acute on chronic CHF exacerbation  She had presented with complaints of SOB and LE edema  BNP >1300, CXR showed vascular congestion, EKG (-) for ischemic changes, and troponin (-)  Symptoms resolved after aggressive diuresis  TTE showed moderate-severe aortic stenosis with EF of 50-55%  Patient has followed with cardiologists since discovery and treatment of her pericardial effusion in 2011, seeing them every 6 months with echo   Aortic stenosis had remained mild-moderate during that time  Valvular function has markedly decreased in one years time, since her move from [facility] and subsequent lapse in cardiac care   Patient has no significant family history for heart disease   She ambulates without assistive device and performs ADLs independently  Lyla Espinosa does not see a dentist regularly "     Since her original H&P she presented today for elective TAVR  Post op once returned to the CCU she went asystolic w/ eventual conversion to CHB w/ RBBB  Due to this EP was consulted for PPM evaluation  Review of Systems  ROS as noted above, otherwise 12 point review of systems was performed and is negative         Historical Information   Past Medical History:   Diagnosis Date    Anxiety     CAD (coronary artery disease)     CHF (congestive heart failure) (HCC)     Colon tumor     s/p resection    Hodgkin's disease ([facility] [address] )     age 7, s/p radiation & chemotherapy    Hyperlipidemia     Hypertension     LBBB (left bundle branch block)     Severe aortic stenosis      Past Surgical History:   Procedure Laterality Date    CARDIAC CATHETERIZATION      COLON SURGERY      non cancerous tumor    NECK SURGERY      PERICARDIAL WINDOW      REPLACEMENT AORTIC VALVE TRANSCATHETER (TAVR)      TOTAL ABDOMINAL HYSTERECTOMY      TUMOR REMOVAL       History   Alcohol Use No     History   Drug Use No     History   Smoking Status    Never Smoker   Smokeless Tobacco    Never Used     Family History: non-contributory    Meds/Allergies   Hospital Medications: Current Facility-Administered Medications   Medication Dose Route Frequency    acetaminophen (TYLENOL) rectal suppository 650 mg  650 mg Rectal Q4H PRN    acetaminophen (TYLENOL) tablet 650 mg  650 mg Oral Q4H PRN    amLODIPine (NORVASC) tablet 10 mg  10 mg Oral Daily    aspirin chewable tablet 81 mg  81 mg Oral Daily    atorvastatin (LIPITOR) tablet 80 mg  80 mg Oral Daily With Dinner    bisacodyl (DULCOLAX) rectal suppository 10 mg  10 mg Rectal Daily PRN    calcium chloride 10 % injection 1 g  1 g Intravenous Once    [START ON 10/17/2018] ceFAZolin (ANCEF) IVPB (premix) 2,000 mg  2,000 mg Intravenous Q8H    ceFAZolin (ANCEF) IVPB (premix) 2,000 mg  2,000 mg Intravenous Once    chlorhexidine (PERIDEX) 0 12 % oral rinse 15 mL  15 mL Swish & Spit BID    citalopram (CeleXA) tablet 40 mg  40 mg Oral Daily    clopidogrel (PLAVIX) tablet 75 mg  75 mg Oral Daily    dexmedetomidine (PRECEDEX) 200 mcg in sodium chloride 0 9 % 50 mL infusion  0 1-0 7 mcg/kg/hr Intravenous Titrated    docusate sodium (COLACE) capsule 100 mg  100 mg Oral BID    fentanyl citrate (PF) 100 MCG/2ML 50 mcg  50 mcg Intravenous Once    fentanyl citrate (PF) 100 MCG/2ML 50 mcg  50 mcg Intravenous Q1H PRN    furosemide (LASIX) injection 40 mg  40 mg Intravenous Once    [START ON 10/17/2018] heparin (porcine) subcutaneous injection 5,000 Units  5,000 Units Subcutaneous Q8H [address]    isoproterenol (ISUPREL) 1000 mcg (STANDARD CONCENTRATION) IV in dextrose 5% 250 mL  1 mcg/min Intravenous Continuous    multi-electrolyte (ISOLYTE-S PH 7 4 equivalent) IV solution  50 mL/hr Intravenous Continuous    mupirocin (BACTROBAN) 2 % nasal ointment 1 application  1 application Nasal S45U [address]    niCARdipine (CARDENE) 25 mg (STANDARD CONCENTRATION) in sodium chloride 0 9% 250 mL  2 5-15 mg/hr Intravenous Titrated    ondansetron (ZOFRAN) injection 4 mg  4 mg Intravenous Q6H PRN    pantoprazole (PROTONIX) EC tablet 40 mg  40 mg Oral Early Morning    polyethylene glycol (MIRALAX) packet 17 g  17 g Oral Daily    potassium chloride 20 mEq IVPB (premix)  20 mEq Intravenous Q1H PRN    potassium chloride 20 mEq IVPB (premix)  20 mEq Intravenous Q30 Min PRN     Home Medications:   Prescriptions Prior to Admission   Medication    amLODIPine (NORVASC) 10 mg tablet    aspirin (ECOTRIN LOW STRENGTH) 81 mg EC tablet    atorvastatin (LIPITOR) 80 mg tablet    citalopram (CeleXA) 40 mg tablet    furosemide (LASIX) 40 mg tablet    multivitamin (THERAGRAN) TABS    mupirocin (BACTROBAN) 2 % ointment    pantoprazole (PROTONIX) 40 mg tablet       No Known Allergies    Objective   Vitals: Blood pressure 146/76, pulse 64, temperature (!) 97 2 °F (36 2 °C), temperature source Probe, resp  rate 17, height 5' 5" (1 651 m), weight 82 1 kg (181 lb), SpO2 94 %  Orthostatic Blood Pressures      Most Recent Value   Blood Pressure  146/76 filed at 10/16/2018 0847            Intake/Output Summary (Last 24 hours) at 10/16/18 1523  Last data filed at 10/16/18 1400   Gross per 24 hour   Intake           298 67 ml   Output              195 ml   Net           103 67 ml       Invasive Devices     Central Venous Catheter Line            CVC Central Lines 10/16/18 Triple less than 1 day          Arterial Line            Arterial Line 10/16/18 Right Radial less than 1 day          Drain            Urethral Catheter Temperature probe;Non-latex 16 Fr  less than 1 day          Airway            ETT  Hi-Lo 7 mm less than 1 day                Physical Exam   Constitutional: She appears well-developed and well-nourished  She is intubated  HENT:   Head: Normocephalic and atraumatic  Eyes:   Intubated    Neck: Neck supple  Intubated    Cardiovascular: Regular rhythm  Bradycardia present  Pulmonary/Chest: Breath sounds normal  She is intubated  Abdominal: Soft  Bowel sounds are normal    Musculoskeletal:   Intubated    Neurological:   intubated   Skin: Skin is warm and dry  Psychiatric:   Intubated        Lab Results: I have personally reviewed pertinent lab results  Results from last 7 days  Lab Units 10/16/18  1117 10/16/18  1050 10/16/18  1030  10/11/18  0927   WBC Thousand/uL  --   --   --   --  10 51*   HEMOGLOBIN g/dL 10 5*  --   --   --  13 6   I STAT HEMOGLOBIN g/dl  --  9 9* 9 9*  < >  --    HEMATOCRIT % 33 4* 29* 29*  < > 42 6   PLATELETS Thousands/uL 349  --   --   --  447*   < > = values in this interval not displayed  Results from last 7 days  Lab Units 10/16/18  1117 10/16/18  1050  10/11/18  0927   SODIUM mmol/L 137  --   --  138   POTASSIUM mmol/L 3 1*  --   --  3 3*   CHLORIDE mmol/L 103  --   --  98*   CO2 mmol/L 27  --   --  38*   BUN mg/dL 12  --   --  14   CREATININE mg/dL 0 75  --   --  0 87   GLUCOSE, ISTAT mg/dl  --  122  < >  --    CALCIUM mg/dL 7 3*  --   --  8 4   < > = values in this interval not displayed  Results from last 7 days  Lab Units 10/11/18  0927   INR  1 04           Imaging: I have personally reviewed pertinent reports  ECHO:   Results for orders placed during the hospital encounter of 18   Echo complete with contrast if indicated    Narrative [address] [place], [place] [address]  [phone]    Transthoracic Echocardiogram  2D, M-mode, Doppler, and Color Doppler    Study date:  02-Aug-2018    Patient: Jb Soriano  MR number: [id number]  Account number: [de-identified]  : 1965  Age: 50 years  Gender: Female  Status: Inpatient  Location: Emergency room  Height: 64 in  Weight: 176 lb  BP: 114/ 59 mmHg    Indications: Dyspnea, wheezing, tachypnea, Shortness of breath      Diagnoses: R06 00 - Dyspnea, unspecified, R06 02 - Shortness of breath    Sonographer:   [facility] [address] [place]  Interpreting Physician:  Leonarda Pitts MD  Referring Physician:  Luis Avelar PAULETTE Garcia Ala  Group:  [facility]    SUMMARY    PROCEDURE INFORMATION:  This was a technically difficult study  Echocardiographic views were limited due to poor acoustic window availability, decreased penetration, and lung interference  LEFT VENTRICLE:  Systolic function was at the lower limits of normal  Ejection fraction was estimated in the range of 50 % to 55 %  There were no regional wall motion abnormalities  Left ventricular diastolic function parameters were normal     RIGHT VENTRICLE:  The size was normal   Systolic function was normal     MITRAL VALVE:  There was moderate annular calcification  There was mild stenosis  There was trace regurgitation  AORTIC VALVE:  The valve was trileaflet  Leaflets exhibited increased thickness and calcification with reduced cuspal separation  Transaortic velocity was increased due to valvular stenosis  There was moderate to severe stenosis  Peak and mean AV gradients were 50 and 31 mm Hg respectively  GIL by the continuity equation method was 0 6 sq cm  There was mild regurgitation  TRICUSPID VALVE:  There was trace regurgitation  Pulmonary artery systolic pressure was at the upper limits of normal     PULMONIC VALVE:  There was mild to moderate regurgitation  HISTORY: PRIOR HISTORY: Abnormal EKG, Cancer, Congestive heart failure  Risk factors: hypertension  PROCEDURE: The procedure was performed in the emergency room  This was a routine study  The transthoracic approach was used  The study included complete 2D imaging, M-mode, complete spectral Doppler, and color Doppler  The heart rate was  82 bpm, at the start of the study  Images were obtained from the parasternal, apical, subcostal, and suprasternal notch acoustic windows  Echocardiographic views were limited due to poor acoustic window availability, decreased penetration,  and lung interference  This was a technically difficult study      LEFT VENTRICLE: Size was normal  Systolic function was at the lower limits of normal  Ejection fraction was estimated in the range of 50 % to 55 %  There were no regional wall motion abnormalities  Wall thickness was normal  No evidence of  apical thrombus  DOPPLER: Left ventricular diastolic function parameters were normal     RIGHT VENTRICLE: The size was normal  Systolic function was normal  Wall thickness was normal     LEFT ATRIUM: Size was normal     RIGHT ATRIUM: Size was normal     MITRAL VALVE: There was moderate annular calcification  There was normal leaflet separation  DOPPLER: There was mild stenosis  There was trace regurgitation  AORTIC VALVE: The valve was trileaflet  Leaflets exhibited increased thickness and calcification with reduced cuspal separation  DOPPLER: Transaortic velocity was increased due to valvular stenosis  There was moderate to severe stenosis  Peak and mean AV gradients were 50 and 31 mm Hg respectively  GIL by the continuity equation method was 0 6 sq cm  There was mild regurgitation  TRICUSPID VALVE: The valve structure was normal  There was normal leaflet separation  DOPPLER: The transtricuspid velocity was within the normal range  There was no evidence for stenosis  There was trace regurgitation  Pulmonary artery  systolic pressure was at the upper limits of normal     PULMONIC VALVE: Leaflets exhibited normal thickness, no calcification, and normal cuspal separation  DOPPLER: The transpulmonic velocity was within the normal range  There was mild to moderate regurgitation  PERICARDIUM: There was no pericardial effusion  The pericardium was normal in appearance  AORTA: The root exhibited normal size  SYSTEMIC VEINS: IVC: The inferior vena cava was normal in size   Respirophasic changes were normal     SYSTEM MEASUREMENT TABLES    2D  %FS: 26 5 %  Ao Diam: 2 7 cm  EDV(Teich): 116 2 ml  EF(Teich): 51 7 %  ESV(Teich): 56 1 ml  IVSd: 0 7 cm  LA Area: 18 3 cm2  LA Diam: 3 2 cm  LVEDV MOD A4C: 101 8 ml  LVEF MOD A4C: 44 1 %  LVESV MOD A4C: 56 9 ml  LVIDd: 5 cm  LVIDs: 3 6 cm  LVLd A4C: 7 9 cm  LVLs A4C: 6 6 cm  LVOT Diam: 1 9 cm  LVPWd: 0 8 cm  RA Area: 19 1 cm2  RVIDd: 3 3 cm  SV MOD A4C: 44 9 ml  SV(Teich): 60 ml    CW  AR Dec [place]: 3 8 m/s2  AR Dec Time: 1100 5 ms  AR PHT: 319 1 ms  AR Vmax: 4 2 m/s  AR maxP 9 mmHg  AV Env  Ti: 304 5 ms  AV VTI: 75 1 cm  AV Vmax: 3 3 m/s  AV Vmax: 3 5 m/s  AV Vmean: 2 5 m/s  AV maxP 3 mmHg  AV maxP 6 mmHg  AV meanP 2 mmHg  HR: 84 3 BPM  MV VTI: 36 6 cm  MV Vmax: 1 4 m/s  MV Vmean: 0 9 m/s  MV maxP 1 mmHg  MV meanPG: 3 6 mmHg  TR Vmax: 3 m/s  TR maxP 3 mmHg    MM  TAPSE: 1 8 cm    PW  GIL (VTI): 0 8 cm2  GIL Vmax: 0 7 cm2  GIL Vmax: 0 7 cm2  E': 0 1 m/s  E/E': 21  LVOT Env  Ti: 323 5 ms  LVOT VTI: 21 7 cm  LVOT Vmax: 0 9 m/s  LVOT Vmean: 0 7 m/s  LVOT maxPG: 3 3 mmHg  LVOT meanP mmHg  LVSV Dopp: 59 1 ml  MV A Papi: 1 m/s  MV Dec [place]: 5 4 m/s2  MV DecT: 210 1 ms  MV E Papi: 1 1 m/s  MV E/A Ratio: 1 1  MV PHT: 60 9 ms  MVA (VTI): 1 6 cm2  MVA By PHT: 3 6 cm2    [employer] Accredited Echocardiography Laboratory    Prepared and electronically signed by    Yashira Beaulieu MD  Signed 02-Aug-2018 19:09:33         CATH/STRESS TEST:  CORONARY VESSELS:   --  Left main: The vessel was normal sized  Angiography showed mild atherosclerosis  --  LAD: The vessel was normal sized  Angiography showed mild atherosclerosis  --  1st diagonal: The vessel was normal sized  Angiography showed mild atherosclerosis  --  Circumflex: The vessel was normal sized  Angiography showed mild atherosclerosis  --  Ramus intermedius: The vessel was normal sized  Angiography showed mild atherosclerosis  --  RCA: The vessel was large sized (dominant)  Angiography showed mild atherosclerosis  --  Right PDA: The vessel was normal sized  Angiography showed mild atherosclerosis  --  Right posterolateral segment: The vessel was normal sized   Angiography showed minor luminal irregularities        EKG:

## 2018-10-16 NOTE — ANESTHESIA PROCEDURE NOTES
Arterial Line Insertion  Date/Time: 10/16/2018 9:38 AM  Performed by: Kirit Ren by: Barbie Guevara   Consent: Verbal consent obtained  Written consent obtained  Risks and benefits: risks, benefits and alternatives were discussed  Consent given by: patient  Patient understanding: patient states understanding of the procedure being performed  Patient consent: the patient's understanding of the procedure matches consent given  Procedure consent: procedure consent matches procedure scheduled  Relevant documents: relevant documents present and verified  Test results: test results available and properly labeled  Site marked: the operative site was marked  Radiology Images: Radiology Images displayed and confirmed  If images not available, report reviewed  Required items: required blood products, implants, devices, and special equipment available  Patient identity confirmed: arm band and hospital-assigned identification number  Time out: Immediately prior to procedure a "time out" was called to verify the correct patient, procedure, equipment, support staff and site/side marked as required  Preparation: Patient was prepped and draped in the usual sterile fashion  Indications: hemodynamic monitoring  Orientation:  Right  Location: radial artery  Anesthesia: local infiltration  Local Anesthetic: lidocaine 1% without epinephrine  Anesthetic total: 1 mL    Sedation:  Patient sedated: yes  Sedation type: anxiolysis  Sedatives: midazolam  Analgesia: fentanyl  Vitals: Vital signs were monitored during sedation      Procedure Details:  Deangelo's test normal: yes  Needle gauge: 20  Seldinger technique: Seldinger technique used  Number of attempts: 2    Post-procedure:  Post-procedure: dressing applied  Waveform: good waveform  Patient tolerance: Patient tolerated the procedure well with no immediate complications

## 2018-10-17 ENCOUNTER — APPOINTMENT (INPATIENT)
Dept: RADIOLOGY | Facility: HOSPITAL | Age: 53
DRG: 175 | End: 2018-10-17
Payer: COMMERCIAL

## 2018-10-17 ENCOUNTER — APPOINTMENT (INPATIENT)
Dept: NON INVASIVE DIAGNOSTICS | Facility: HOSPITAL | Age: 53
DRG: 175 | End: 2018-10-17
Payer: COMMERCIAL

## 2018-10-17 PROBLEM — Z95.0 PRESENCE OF PERMANENT CARDIAC PACEMAKER: Status: ACTIVE | Noted: 2018-10-17

## 2018-10-17 PROBLEM — I44.2 CHB (COMPLETE HEART BLOCK) (HCC): Status: ACTIVE | Noted: 2018-10-17

## 2018-10-17 LAB
ANION GAP SERPL CALCULATED.3IONS-SCNC: 4 MMOL/L (ref 4–13)
ATRIAL RATE: 81 BPM
BUN SERPL-MCNC: 10 MG/DL (ref 5–25)
CALCIUM SERPL-MCNC: 7.1 MG/DL (ref 8.3–10.1)
CHLORIDE SERPL-SCNC: 103 MMOL/L (ref 100–108)
CO2 SERPL-SCNC: 29 MMOL/L (ref 21–32)
CREAT SERPL-MCNC: 0.7 MG/DL (ref 0.6–1.3)
ERYTHROCYTE [DISTWIDTH] IN BLOOD BY AUTOMATED COUNT: 15.3 % (ref 11.6–15.1)
GFR SERPL CREATININE-BSD FRML MDRD: 101 ML/MIN/1.73SQ M
GLUCOSE SERPL-MCNC: 112 MG/DL (ref 65–140)
GLUCOSE SERPL-MCNC: 137 MG/DL (ref 65–140)
GLUCOSE SERPL-MCNC: 147 MG/DL (ref 65–140)
HCT VFR BLD AUTO: 33.6 % (ref 34.8–46.1)
HGB BLD-MCNC: 10.4 G/DL (ref 11.5–15.4)
MAGNESIUM SERPL-MCNC: 1.6 MG/DL (ref 1.6–2.6)
MCH RBC QN AUTO: 27.4 PG (ref 26.8–34.3)
MCHC RBC AUTO-ENTMCNC: 31 G/DL (ref 31.4–37.4)
MCV RBC AUTO: 88 FL (ref 82–98)
P AXIS: 55 DEGREES
PLATELET # BLD AUTO: 323 THOUSANDS/UL (ref 149–390)
PMV BLD AUTO: 10.3 FL (ref 8.9–12.7)
POTASSIUM SERPL-SCNC: 3.7 MMOL/L (ref 3.5–5.3)
POTASSIUM SERPL-SCNC: 3.8 MMOL/L (ref 3.5–5.3)
PR INTERVAL: 150 MS
QRS AXIS: 40 DEGREES
QRSD INTERVAL: 125 MS
QT INTERVAL: 479 MS
QTC INTERVAL: 557 MS
RBC # BLD AUTO: 3.8 MILLION/UL (ref 3.81–5.12)
SODIUM SERPL-SCNC: 136 MMOL/L (ref 136–145)
T WAVE AXIS: 235 DEGREES
VENTRICULAR RATE: 81 BPM
WBC # BLD AUTO: 14.72 THOUSAND/UL (ref 4.31–10.16)

## 2018-10-17 PROCEDURE — 93010 ELECTROCARDIOGRAM REPORT: CPT | Performed by: INTERNAL MEDICINE

## 2018-10-17 PROCEDURE — 33210 INSERT ELECTRD/PM CATH SNGL: CPT | Performed by: EMERGENCY MEDICINE

## 2018-10-17 PROCEDURE — 84132 ASSAY OF SERUM POTASSIUM: CPT | Performed by: PHYSICIAN ASSISTANT

## 2018-10-17 PROCEDURE — G8978 MOBILITY CURRENT STATUS: HCPCS

## 2018-10-17 PROCEDURE — 99024 POSTOP FOLLOW-UP VISIT: CPT | Performed by: INTERNAL MEDICINE

## 2018-10-17 PROCEDURE — 71045 X-RAY EXAM CHEST 1 VIEW: CPT

## 2018-10-17 PROCEDURE — G8979 MOBILITY GOAL STATUS: HCPCS

## 2018-10-17 PROCEDURE — 93005 ELECTROCARDIOGRAM TRACING: CPT

## 2018-10-17 PROCEDURE — 83735 ASSAY OF MAGNESIUM: CPT | Performed by: PHYSICIAN ASSISTANT

## 2018-10-17 PROCEDURE — 97110 THERAPEUTIC EXERCISES: CPT

## 2018-10-17 PROCEDURE — 80048 BASIC METABOLIC PNL TOTAL CA: CPT | Performed by: PHYSICIAN ASSISTANT

## 2018-10-17 PROCEDURE — 85027 COMPLETE CBC AUTOMATED: CPT | Performed by: PHYSICIAN ASSISTANT

## 2018-10-17 PROCEDURE — 93306 TTE W/DOPPLER COMPLETE: CPT

## 2018-10-17 PROCEDURE — 97163 PT EVAL HIGH COMPLEX 45 MIN: CPT

## 2018-10-17 PROCEDURE — 82948 REAGENT STRIP/BLOOD GLUCOSE: CPT

## 2018-10-17 PROCEDURE — 99233 SBSQ HOSP IP/OBS HIGH 50: CPT | Performed by: EMERGENCY MEDICINE

## 2018-10-17 RX ORDER — ACETAMINOPHEN 325 MG/1
650 TABLET ORAL EVERY 4 HOURS PRN
Status: DISCONTINUED | OUTPATIENT
Start: 2018-10-17 | End: 2018-10-19 | Stop reason: HOSPADM

## 2018-10-17 RX ORDER — OXYCODONE HYDROCHLORIDE 5 MG/1
5 TABLET ORAL EVERY 4 HOURS PRN
Status: DISCONTINUED | OUTPATIENT
Start: 2018-10-17 | End: 2018-10-19 | Stop reason: HOSPADM

## 2018-10-17 RX ORDER — FUROSEMIDE 40 MG/1
40 TABLET ORAL DAILY
Status: DISCONTINUED | OUTPATIENT
Start: 2018-10-17 | End: 2018-10-19 | Stop reason: HOSPADM

## 2018-10-17 RX ORDER — DOCUSATE SODIUM 100 MG/1
100 CAPSULE, LIQUID FILLED ORAL 2 TIMES DAILY
Status: DISCONTINUED | OUTPATIENT
Start: 2018-10-17 | End: 2018-10-17

## 2018-10-17 RX ORDER — MAGNESIUM SULFATE HEPTAHYDRATE 40 MG/ML
4 INJECTION, SOLUTION INTRAVENOUS ONCE
Status: COMPLETED | OUTPATIENT
Start: 2018-10-17 | End: 2018-10-17

## 2018-10-17 RX ADMIN — HEPARIN SODIUM 5000 UNITS: 5000 INJECTION, SOLUTION INTRAVENOUS; SUBCUTANEOUS at 21:10

## 2018-10-17 RX ADMIN — CLOPIDOGREL BISULFATE 75 MG: 75 TABLET ORAL at 08:31

## 2018-10-17 RX ADMIN — MUPIROCIN 1 APPLICATION: 20 OINTMENT TOPICAL at 20:11

## 2018-10-17 RX ADMIN — MAGNESIUM SULFATE HEPTAHYDRATE 4 G: 40 INJECTION, SOLUTION INTRAVENOUS at 07:03

## 2018-10-17 RX ADMIN — CEFAZOLIN SODIUM 2000 MG: 2 SOLUTION INTRAVENOUS at 08:31

## 2018-10-17 RX ADMIN — ACETAMINOPHEN 650 MG: 325 TABLET, FILM COATED ORAL at 00:18

## 2018-10-17 RX ADMIN — POTASSIUM CHLORIDE 20 MEQ: 200 INJECTION, SOLUTION INTRAVENOUS at 06:16

## 2018-10-17 RX ADMIN — DOCUSATE SODIUM 100 MG: 100 CAPSULE, LIQUID FILLED ORAL at 08:31

## 2018-10-17 RX ADMIN — CEFAZOLIN SODIUM 2000 MG: 2 SOLUTION INTRAVENOUS at 01:30

## 2018-10-17 RX ADMIN — OXYCODONE HYDROCHLORIDE 5 MG: 5 TABLET ORAL at 21:10

## 2018-10-17 RX ADMIN — MUPIROCIN 1 APPLICATION: 20 OINTMENT TOPICAL at 08:32

## 2018-10-17 RX ADMIN — DOCUSATE SODIUM 100 MG: 100 CAPSULE, LIQUID FILLED ORAL at 17:08

## 2018-10-17 RX ADMIN — POLYETHYLENE GLYCOL 3350 17 G: 17 POWDER, FOR SOLUTION ORAL at 08:31

## 2018-10-17 RX ADMIN — ACETAMINOPHEN 650 MG: 325 TABLET, FILM COATED ORAL at 20:10

## 2018-10-17 RX ADMIN — OXYCODONE HYDROCHLORIDE 5 MG: 5 TABLET ORAL at 17:07

## 2018-10-17 RX ADMIN — OXYCODONE HYDROCHLORIDE 5 MG: 5 TABLET ORAL at 09:05

## 2018-10-17 RX ADMIN — CITALOPRAM HYDROBROMIDE 40 MG: 20 TABLET ORAL at 08:31

## 2018-10-17 RX ADMIN — CHLORHEXIDINE GLUCONATE 15 ML: 1.2 RINSE ORAL at 08:32

## 2018-10-17 RX ADMIN — FUROSEMIDE 40 MG: 40 TABLET ORAL at 11:00

## 2018-10-17 RX ADMIN — ATORVASTATIN CALCIUM 80 MG: 80 TABLET, FILM COATED ORAL at 15:51

## 2018-10-17 RX ADMIN — HEPARIN SODIUM 5000 UNITS: 5000 INJECTION, SOLUTION INTRAVENOUS; SUBCUTANEOUS at 05:23

## 2018-10-17 RX ADMIN — ACETAMINOPHEN 650 MG: 325 TABLET, FILM COATED ORAL at 12:30

## 2018-10-17 RX ADMIN — HEPARIN SODIUM 5000 UNITS: 5000 INJECTION, SOLUTION INTRAVENOUS; SUBCUTANEOUS at 14:30

## 2018-10-17 RX ADMIN — AMLODIPINE BESYLATE 10 MG: 10 TABLET ORAL at 08:32

## 2018-10-17 RX ADMIN — PANTOPRAZOLE SODIUM 40 MG: 40 TABLET, DELAYED RELEASE ORAL at 05:23

## 2018-10-17 RX ADMIN — CEFAZOLIN SODIUM 2000 MG: 2 SOLUTION INTRAVENOUS at 15:51

## 2018-10-17 RX ADMIN — Medication 81 MG: at 08:31

## 2018-10-17 RX ADMIN — POTASSIUM CHLORIDE 20 MEQ: 200 INJECTION, SOLUTION INTRAVENOUS at 02:31

## 2018-10-17 NOTE — PLAN OF CARE
Problem: PHYSICAL THERAPY ADULT  Goal: Performs mobility at highest level of function for planned discharge setting  See evaluation for individualized goals  Treatment/Interventions: Functional transfer training, LE strengthening/ROM, Elevations, Therapeutic exercise, Endurance training, Equipment eval/education, Bed mobility, Gait training, Spoke to nursing, Spoke to case management  Equipment Recommended: Karine Das (at this time; will cont to monitor progress)       See flowsheet documentation for full assessment, interventions and recommendations  Prognosis: Good  Problem List: Decreased strength, Decreased endurance, Impaired balance, Decreased mobility  Assessment: Pt is 48 y o  female admitted with Dx of Aortic stenosis, severe and underwent TAVR on 10/16/2018; post op course included CHB w/ RBBB --> pt underwent PPM placement on 10/16/2018  Pt 's comorbidities affecting POC include: anxiety, CA, CHF, and HTN personal factors of: ANDRE, steps in the house and ? level of support available at home  Pt's clinical presentation is currently unstable/unpredictable which is evident in ongoing telem monitoring while in a critical care unit, abn lab values being monitored, suppl O2 needs, postop pain and guarding w/ (L) UE precautions post PPM, and need for min assist w/ all phases of mobility when usually mobilizing independently  Pt presents w/ generalized weakness, incl decreased LE strength, decreased functional endurance and activity tolerance, min impaired balance w/ gait deviations (rw is being utilized at this time) and fall risk  Will cont to follow pt in PT for progressive mobilization to address above functional deficits and to max level of (I), endurance, and safety  Currently, pt declines rehab upon D/C and feels she will be able to return home with available family support; home PT follow up is therefore recommended; will follow    Barriers to Discharge: Inaccessible home environment, Decreased caregiver support     Recommendation: Home PT, Home with family support (pt declines rehab;1st floor set-up at least initially at D/C)          See flowsheet documentation for full assessment

## 2018-10-17 NOTE — UTILIZATION REVIEW
Thank you,  [address]  Utilization Review Department  Phone: [phone]; Fax [phone]  ATTENTION: Please call with any questions or concerns to [phone]  and carefully follow the prompts so that you are directed to the right person  Send all requests for admission clinical reviews, approved or denied determinations and any other requests to fax [phone]  All voicemails are confidential      ------------------------------------------------------------------------------------------------------------------------    Initial Clinical Review    Age/Sex: 48 y o  female    Surgery Date: 10/16/2018    Procedure: Transcatheter aortic valve replacement with a 23 mm Luo LEODAN 3 bioprosthetic valve via a percutaneous left transfemoral approach  Anesthesia: Dr Edy Wesley and Dr Lenny Berg, general endotracheal anesthesia with transesophageal echocardiogram guidance  Admission Orders: Date/Time/Statement: 10/16/18 @ 1102  Orders Placed This Encounter   Procedures    Inpatient Admission     Standing Status:   Standing     Number of Occurrences:   1     Order Specific Question:   Admitting Physician     Answer:   Pastor Whitmore     Order Specific Question:   Level of Care     Answer:   Critical Care [15]     Order Specific Question:   Estimated length of stay     Answer:   More than 2 Midnights     Order Specific Question:   Certification     Answer:   I certify that inpatient services are medically necessary for this patient for a duration of greater than two midnights  See H&P and MD Progress Notes for additional information about the patient's course of treatment         Vital Signs: BP (!) 107/45   Pulse 80   Temp 98 9 °F (37 2 °C)   Resp 20   Ht 5' 5" (1 651 m)   Wt 76 9 kg (169 lb 8 5 oz)   SpO2 (!) 88%   BMI 28 21 kg/m²     Diet:        Diet Orders            Start     Ordered    10/16/18 1400  Diet Cardiac; Cardiac TLC 2 3 GM NA; Fluid Restriction 1800 ML  Diet effective 1400     Question Answer Comment   Diet Type Cardiac    Cardiac Cardiac TLC 2 3 GM NA    Other Restriction(s): Fluid Restriction 1800 ML    RD to adjust diet per protocol? Yes        10/16/18 1102      A  Line right radial / Triple CVC Line / Introducer  Neurovascualr checks Q1h  ECHO: pending  Consult PT/OT  TELM  Keep HOB elevated 30 degree at all times    Mobility: Up with assistance / Up as tolerated / Pod #1 OOB to chair and for all meals    DVT Prophylaxis: Ryan SCDs    Pain Control:   Pain Medications             aspirin (ECOTRIN LOW STRENGTH) 81 mg EC tablet Take 81 mg by mouth daily    citalopram (CeleXA) 40 mg tablet Take 40 mg by mouth daily            Scheduled Meds:  Current Facility-Administered Medications:  acetaminophen 650 mg Oral Q4H PRN PAULETTE Leija    amLODIPine 10 mg Oral Daily Ruben Friend PA-C    aspirin 81 mg Oral Daily Ruben Friend PA-C    atorvastatin 80 mg Oral Daily With BHAVIK Neumann    bisacodyl 10 mg Rectal Daily PRN Ruben Friend PA-C    cefazolin 2,000 mg Intravenous Q8H Ruben Friend PA-C Last Rate: 2,000 mg (10/17/18 0831)   citalopram 40 mg Oral Daily Ruben Friend PA-C    clopidogrel 75 mg Oral Daily Ruben Friend PA-C    docusate sodium 100 mg Oral BID Rios Epperson PA-C    fentanyl citrate (PF) 50 mcg Intravenous Once M D C  BHAVIK Conde    furosemide 40 mg Oral Daily PAULETTE Leija    heparin (porcine) 5,000 Units Subcutaneous Q8H [address] Ruben Friend PA-C    mupirocin 1 application Nasal V84O [address] Ruben Friend PA-C    ondansetron 4 mg Intravenous Q6H PRN Ruben Friend PA-C    oxyCODONE 5 mg Oral Q4H PRN Meg Spironello PAULETTE VENEGAS    pantoprazole 40 mg Oral Early Morning Ruben Friend PA-C    polyethylene glycol 17 g Oral Daily M D C  BHAVIK Conde

## 2018-10-17 NOTE — PHYSICAL THERAPY NOTE
PHYSICAL THERAPY NOTE          Patient Name: Chloe Zamudio  CBWHU'P Date: 10/17/2018  Time In: 14:40  Time Out: 14:50  Total Time: 10 min      S:  Pt is agreeable to perform LE therex;     O:  (B) LE therex performed in sitting as following: (B) ankle pumps 2 x 10 reps, AROM (HEP); (B) LAQ 2 x 10 reps, AROM (HEP); (B) hip abd/add 2 x 10 reps, AAROM; pt was reclined in the chair w/ LE elevated at the end of session; call bell and phone placed w/in reach;    A:  Additional follow up consecutive session performed to initiate LE therex in order to max strength and to facilitate progression w/ functional mobility skills and overall level of (I) and to initiate HEP; pt was able to complete the program w/ no excessive fatigue expressed; O2 sat at 100-98 % while on suppl O2; pt appeared to be comfortable at the end of session; (L) UE precautions reviewed --> pt expressed understanding; pt cont to indicate she will return home upon D/C; spouse is present and aware; home PT follow up is recommended; will follow        P:  Cont to follow 4-6x/week for LE therex, functional mobility training, endurance training and pt education per Zev Booth, PT

## 2018-10-17 NOTE — RESPIRATORY THERAPY NOTE
RT Protocol Note  Sindy Leger 48 y o  female MRN: [id number]  Unit/Bed#: [facility] 418-01 Encounter: [id number]    Assessment    Principal Problem: Aortic stenosis, severe  Active Problems:    CHF (congestive heart failure) (HCC)    Hypertension    Coronary artery disease involving native coronary artery of native heart    HLD (hyperlipidemia)    History of Hodgkin's lymphoma    Anxiety    S/P pericardial window creation    Acute postoperative pulmonary insufficiency (HCC)    S/p TAVR (transcatheter aortic valve replacement), bioprosthetic    CAD (coronary artery disease)    Hyperlipidemia    Severe aortic stenosis      Home Pulmonary Medications:  none       Past Medical History:   Diagnosis Date    Anxiety     CAD (coronary artery disease)     CHF (congestive heart failure) (HCC)     Colon tumor     s/p resection    Hodgkin's disease ([address] )     age 7, s/p radiation & chemotherapy    Hyperlipidemia     Hypertension     LBBB (left bundle branch block)     Severe aortic stenosis      Social History     Social History    Marital status: /Civil Union     Spouse name: N/A    Number of children: N/A    Years of education: N/A     Social History Main Topics    Smoking status: Never Smoker    Smokeless tobacco: Never Used    Alcohol use No    Drug use: No    Sexual activity: No     Other Topics Concern    None     Social History Narrative    None       Subjective         Objective    Physical Exam:   Assessment Type: Assess only  General Appearance: Alert, Awake  Respiratory Pattern: Symmetrical, Assisted  Chest Assessment: Chest expansion symmetrical  Bilateral Breath Sounds: Clear, Diminished  O2 Device: vent    Vitals:  Blood pressure 146/76, pulse 74, temperature 99 3 °F (37 4 °C), resp  rate 15, height 5' 5" (1 651 m), weight 82 1 kg (181 lb), SpO2 99 %        Results from last 7 days  Lab Units 10/16/18  2235   PH ART  7 432   PCO2 ART mm Hg 42 5   PO2 ART mm Hg 92 7   HCO3 ART mmol/L 27 7 BASE EXC ART mmol/L 3 1   O2 CONTENT ART mL/dL 16 5   O2 HGB, ARTERIAL % 96 4   ABG SOURCE  Line, Arterial       Imaging and other studies: I have personally reviewed pertinent reports  O2 Device: vent     Plan    Respiratory Plan: No distress/Pulmonary history, Discontinue Protocol  Airway Clearance Plan: Incentive Spirometer     Resp Comments: (P) 48 yr old female s/p TAVR currently resting comfortably on 6L, keagan bs diminished clear, npc, x-ray na, pt has no known pulm hx and no udns are indicated at this time, pt will be started on IS Q1 WA for ACP

## 2018-10-17 NOTE — PROGRESS NOTES
Progress Note - Electrophysiology-Cardiology (EP)   Cheryl Serrano 48 y o  female MRN: [id number]  Unit/Bed#: [facility] 418-01 Encounter: [id number]      Assessment/Plan:   1  CHB     * s/p MEDTRONIC DC PPM by Dr Sharon Avelar on 10-16-18   * today his site is soft without any ecchymosis or hematoma    * device was interrogated and found to be in appropriate function    * CXRs reviewed without any PTX with proper lead placement    * we discussed proper post site care to which the patient understood, he was also given written instructions    * pt will f/u in our device clinic in 2 weeks time for device check, this appointment was [facility] in [facility]    2  Severe AS s/p TAVR    * TAVR performed on 10-16-18   * patient awake and extubated    * EF 55%    At this time the patients EP issue is stable  We will sign off  Please call with questions       Subjective/Objective   Subjective:   Leena Eli is a 48year old female Hodkin's lymphoma s/p radiation, severe AS 2/2 radiation, 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 and POD#1 s/p TAVR  EP was originally consulted for CHB  10-17-18:   Since last seen by EP patient underwent DC MDT PPM implant by Dr Sharon Avelar  Today she is having mild left shoulder discomfort but is not having any SOB, LH, chest discomfort or dizziness         Vitals: BP (!) 107/45   Pulse 80   Temp 99 7 °F (37 6 °C) (Probe)   Resp 20   Ht 5' 5" (1 651 m)   Wt 76 9 kg (169 lb 8 5 oz)   SpO2 (!) 88%   BMI 28 21 kg/m²   Vitals:    10/16/18 0847 10/17/18 0500   Weight: 82 1 kg (181 lb) 76 9 kg (169 lb 8 5 oz)     Orthostatic Blood Pressures      Most Recent Value   Blood Pressure   107/45 filed at 10/17/2018 1300            Intake/Output Summary (Last 24 hours) at 10/17/18 1413  Last data filed at 10/17/18 0900   Gross per 24 hour   Intake          2159 48 ml   Output             2605 ml   Net          -445 52 ml       Invasive Devices     Central Venous Catheter Line            CVC Central Lines 10/16/18 Triple 1 day Peripheral Intravenous Line            Peripheral IV 10/16/18 Left Wrist 1 day          Drain            Urethral Catheter Temperature probe;Non-latex 16 Fr  1 day          Airway            ETT  Hi-Lo 7 mm 1 day                            Scheduled Meds:  Current Facility-Administered Medications:  acetaminophen 650 mg Oral Q4H PRN Meg Spironello V, CRNP    amLODIPine 10 mg Oral Daily Ruben Friend PA-C    aspirin 81 mg Oral Daily Ruben Friend PA-C    atorvastatin 80 mg Oral Daily With BHAVIK Neumann    bisacodyl 10 mg Rectal Daily PRN Ruben Friend PA-C    cefazolin 2,000 mg Intravenous Q8H Ruben Friend PA-C Last Rate: 2,000 mg (10/17/18 0831)   citalopram 40 mg Oral Daily Ruben Friend PA-C    clopidogrel 75 mg Oral Daily Ruben Friend PA-C    docusate sodium 100 mg Oral BID Vicki Haines PA-C    fentanyl citrate (PF) 50 mcg Intravenous Once M D C  BHAVIK Conde    furosemide 40 mg Oral Daily TANIA LeijaNP    heparin (porcine) 5,000 Units Subcutaneous Q8H [address] Ruben Friend PA-C    mupirocin 1 application Nasal H90J [address] Ruben Friend PA-C    ondansetron 4 mg Intravenous Q6H PRN Ruben Friend PA-C    oxyCODONE 5 mg Oral Q4H PRN Meg Spironello V, TANIANP    pantoprazole 40 mg Oral Early Morning Ruben Friend PA-C    polyethylene glycol 17 g Oral Daily Ruben Friend PA-C      Continuous Infusions:   PRN Meds:   acetaminophen    bisacodyl    ondansetron    oxyCODONE    Physical Exam:   GEN: NAD, alert and oriented, well appearing  SKIN: dry without significant lesions or rashes  HEENT: NCAT, PERRL, EOMs intact  NECK: No JVD or carotid bruits appreciated  CARDIOVASCULAR: RRR, normal S1, S2 without murmurs, rubs, or gallops appreciated  LUNGS: Clear to auscultation bilaterally without wheezes, rhonchi, or rales  ABDOMEN: Soft, nontender, nondistended  EXTREMITIES/VASCULAR: perfused without clubbing, cyanosis, or edema b/l  PSYCH: Normal mood and affect  NEURO: CN ll-Xll grossly intact                Lab Results: I have personally reviewed pertinent lab results  Results from last 7 days  Lab Units 10/17/18  0434 10/16/18  1826 10/16/18  1117  10/11/18  0927   WBC Thousand/uL 14 72*  --   --   --  10 51*   HEMOGLOBIN g/dL 10 4* 11 4* 10 5*  --  13 6   I STAT HEMOGLOBIN   --   --   --   < >  --    HEMATOCRIT % 33 6* 36 5 33 4*  < > 42 6   PLATELETS Thousands/uL 323  --  349  --  447*   < > = values in this interval not displayed  Results from last 7 days  Lab Units 10/17/18  0434 10/17/18  0130 10/16/18  1825 10/16/18  1117 10/16/18  1050  10/11/18  0927   SODIUM mmol/L 136  --   --  137  --   --  138   POTASSIUM mmol/L 3 8 3 7 3 4* 3 1*  --   --  3 3*   CHLORIDE mmol/L 103  --   --  103  --   --  98*   CO2 mmol/L 29  --   --  27  --   --  38*   BUN mg/dL 10  --   --  12  --   --  14   CREATININE mg/dL 0 70  --   --  0 75  --   --  0 87   GLUCOSE, ISTAT mg/dl  --   --   --   --  122  < >  --    CALCIUM mg/dL 7 1*  --   --  7 3*  --   --  8 4   < > = values in this interval not displayed  Results from last 7 days  Lab Units 10/11/18  0927   INR  1 04       Results from last 7 days  Lab Units 10/17/18  0434   MAGNESIUM mg/dL 1 6         Imaging: I have personally reviewed pertinent reports  Results for orders placed during the hospital encounter of 18   Echo complete with contrast if indicated    Narrative [address] [place] [address] [phone]    Transthoracic Echocardiogram  2D, M-mode, Doppler, and Color Doppler    Study date:  02-Aug-2018    Patient: Trey Barrett  MR number: [id number]  Account number: [de-identified]  : 1965  Age: 50 years  Gender: Female  Status: Inpatient  Location: Emergency room  Height: 64 in  Weight: 176 lb  BP: 114/ 59 mmHg    Indications: Dyspnea, wheezing, tachypnea, Shortness of breath      Diagnoses: R06 00 - Dyspnea, unspecified, R06 02 - Shortness of breath    Sonographer:  Nina Valentine  Interpreting Physician:  Timothy Hollis MD  Referring Physician:  PAULETTE Elizabeth  Group:  [facility]    SUMMARY    PROCEDURE INFORMATION:  This was a technically difficult study  Echocardiographic views were limited due to poor acoustic window availability, decreased penetration, and lung interference  LEFT VENTRICLE:  Systolic function was at the lower limits of normal  Ejection fraction was estimated in the range of 50 % to 55 %  There were no regional wall motion abnormalities  Left ventricular diastolic function parameters were normal     RIGHT VENTRICLE:  The size was normal   Systolic function was normal     MITRAL VALVE:  There was moderate annular calcification  There was mild stenosis  There was trace regurgitation  AORTIC VALVE:  The valve was trileaflet  Leaflets exhibited increased thickness and calcification with reduced cuspal separation  Transaortic velocity was increased due to valvular stenosis  There was moderate to severe stenosis  Peak and mean AV gradients were 50 and 31 mm Hg respectively  GIL by the continuity equation method was 0 6 sq cm  There was mild regurgitation  TRICUSPID VALVE:  There was trace regurgitation  Pulmonary artery systolic pressure was at the upper limits of normal     PULMONIC VALVE:  There was mild to moderate regurgitation  HISTORY: PRIOR HISTORY: Abnormal EKG, Cancer, Congestive heart failure  Risk factors: hypertension  PROCEDURE: The procedure was performed in the emergency room  This was a routine study  The transthoracic approach was used  The study included complete 2D imaging, M-mode, complete spectral Doppler, and color Doppler  The heart rate was  82 bpm, at the start of the study  Images were obtained from the parasternal, apical, subcostal, and suprasternal notch acoustic windows   Echocardiographic views were limited due to poor acoustic window availability, decreased penetration,  and lung interference  This was a technically difficult study  LEFT VENTRICLE: Size was normal  Systolic function was at the lower limits of normal  Ejection fraction was estimated in the range of 50 % to 55 %  There were no regional wall motion abnormalities  Wall thickness was normal  No evidence of  apical thrombus  DOPPLER: Left ventricular diastolic function parameters were normal     RIGHT VENTRICLE: The size was normal  Systolic function was normal  Wall thickness was normal     LEFT ATRIUM: Size was normal     RIGHT ATRIUM: Size was normal     MITRAL VALVE: There was moderate annular calcification  There was normal leaflet separation  DOPPLER: There was mild stenosis  There was trace regurgitation  AORTIC VALVE: The valve was trileaflet  Leaflets exhibited increased thickness and calcification with reduced cuspal separation  DOPPLER: Transaortic velocity was increased due to valvular stenosis  There was moderate to severe stenosis  Peak and mean AV gradients were 50 and 31 mm Hg respectively  GIL by the continuity equation method was 0 6 sq cm  There was mild regurgitation  TRICUSPID VALVE: The valve structure was normal  There was normal leaflet separation  DOPPLER: The transtricuspid velocity was within the normal range  There was no evidence for stenosis  There was trace regurgitation  Pulmonary artery  systolic pressure was at the upper limits of normal     PULMONIC VALVE: Leaflets exhibited normal thickness, no calcification, and normal cuspal separation  DOPPLER: The transpulmonic velocity was within the normal range  There was mild to moderate regurgitation  PERICARDIUM: There was no pericardial effusion  The pericardium was normal in appearance  AORTA: The root exhibited normal size  SYSTEMIC VEINS: IVC: The inferior vena cava was normal in size   Respirophasic changes were normal     SYSTEM MEASUREMENT TABLES    2D  %FS: 26 5 %  Ao Diam: 2 7 cm  EDV(Teich): 116 2 ml  EF(Teich): 51 7 %  ESV(Teich): 56 1 ml  IVSd: 0 7 cm  LA Area: 18 3 cm2  LA Diam: 3 2 cm  LVEDV MOD A4C: 101 8 ml  LVEF MOD A4C: 44 1 %  LVESV MOD A4C: 56 9 ml  LVIDd: 5 cm  LVIDs: 3 6 cm  LVLd A4C: 7 9 cm  LVLs A4C: 6 6 cm  LVOT Diam: 1 9 cm  LVPWd: 0 8 cm  RA Area: 19 1 cm2  RVIDd: 3 3 cm  SV MOD A4C: 44 9 ml  SV(Teich): 60 ml    CW  AR Dec [place]: 3 8 m/s2  AR Dec Time: 1100 5 ms  AR PHT: 319 1 ms  AR Vmax: 4 2 m/s  AR maxP 9 mmHg  AV Env  Ti: 304 5 ms  AV VTI: 75 1 cm  AV Vmax: 3 3 m/s  AV Vmax: 3 5 m/s  AV Vmean: 2 5 m/s  AV maxP 3 mmHg  AV maxP 6 mmHg  AV meanP 2 mmHg  HR: 84 3 BPM  MV VTI: 36 6 cm  MV Vmax: 1 4 m/s  MV Vmean: 0 9 m/s  MV maxP 1 mmHg  MV meanPG: 3 6 mmHg  TR Vmax: 3 m/s  TR maxP 3 mmHg    MM  TAPSE: 1 8 cm    PW  GIL (VTI): 0 8 cm2  GIL Vmax: 0 7 cm2  GIL Vmax: 0 7 cm2  E': 0 1 m/s  E/E': 21  LVOT Env  Ti: 323 5 ms  LVOT VTI: 21 7 cm  LVOT Vmax: 0 9 m/s  LVOT Vmean: 0 7 m/s  LVOT maxPG: 3 3 mmHg  LVOT meanP mmHg  LVSV Dopp: 59 1 ml  MV A Papi: 1 m/s  MV Dec [place]: 5 4 m/s2  MV DecT: 210 1 ms  MV E Papi: 1 1 m/s  MV E/A Ratio: 1 1  MV PHT: 60 9 ms  MVA (VTI): 1 6 cm2  MVA By PHT: 3 6 cm2    [employer] Accredited Echocardiography Laboratory    Prepared and electronically signed by    Desirae Warren MD  Signed 02-Aug-2018 19:09:33         EKG:

## 2018-10-17 NOTE — PROGRESS NOTES
Ordered to d/c central line  Unable to obtain peripheral acces besides L wrist IV already in place  Cardiac surgery aware; instructed to leave the line in  Will continue to monitor

## 2018-10-17 NOTE — SOCIAL WORK
54yo female is POD#1 TAVR and PPM  She is alert and oriented, independent ADLS, does not work, drives  She resides in [place] with her  and 2 children, ages 13 and 16  Her  is Olivia Duff who works in [place] and is not home during the week  His phone is [phone]  Her primary contact is her older daughter Aba Roman of [address] at [phone]  She uses no DME, has no hx of HHC or rehab  She has no legal POA  She denies hx mental illness or D&A  She uses [address] on [address] in [place] ([facility])  PT recommending home PT and discussed available HHCs  She chose [facility] [address] [place], [place] [address] Blvd  Referral sent for nsg and home PT  PT will see again tomorrow to determine if RW/cane needed  She states family will transport her home at discharge  CM reviewed d/c planning process including the following: identifying help at home, patient preference for d/c planning needs, Discharge Lounge, [facility] Meds to Bed program, availability of treatment team to discuss questions or concerns patient and/or family may have regarding understanding medications and recognizing signs and symptoms once discharged  CM also encouraged patient to follow up with all recommended appointments after discharge  Patient advised of importance for patient and family to participate in managing patients medical well being  Patient/caregiver received discharge checklist  Content reviewed  Patient/caregiver encouraged to participate in discharge plan of care prior to discharge home

## 2018-10-17 NOTE — PROGRESS NOTES
Progress Note - Critical Care  Laurel Rai 48 y o  female MRN: [id number]  Unit/Bed#: [facility] 418-01 Encounter: [id number]    Attending Physician: Nikita Tenorio MD    24 Hour Events: POD # 1 s/p 1 TF TAVR with a 23 mm Luo LEODAN 3 bioprosthetic valve  Developed CHB post-op which deteriorated into a brief asystolic episode  CPR was initiated for approximately 30 seconds when patient woke spontaneously and had ROSC  She was in CHB with rate in 30s and profound hypotension  0 5mg Atropine x 2 given without improvement  Abelino gtt started  Emergent transvenous pacer inserted via RIJ cordis with successful capture at mA 15 with rate set to 80  She was subsequently taken to the cath lab for placement of PPM with EP  She received 40mg IV Lasix x 1 overnight with good response       Medications:   Scheduled Meds:    Current Facility-Administered Medications:  acetaminophen 650 mg Rectal Q4H PRN Ruben Friend PA-C    acetaminophen 650 mg Oral Q4H PRN Ruben Friend PA-C    amLODIPine 10 mg Oral Daily Ruben Friend PA-C    aspirin 81 mg Oral Daily Ruben Friend PA-C    atorvastatin 80 mg Oral Daily With KeySpanBHAVIK    bisacodyl 10 mg Rectal Daily PRN Ruben Friend PA-C    calcium chloride 1 g Intravenous Once M D DANG Conde PA-C    cefazolin 2,000 mg Intravenous Q8H Ruben Friend PA-C Last Rate: Stopped (10/17/18 0200)   chlorhexidine 15 mL Swish & Spit BID Ruben Friend PA-C    citalopram 40 mg Oral Daily Ruben Friend PA-C    clopidogrel 75 mg Oral Daily Ruben Friend PA-C    docusate sodium 100 mg Oral BID Ofelia Montalvo PA-C    fentanyl citrate (PF) 50 mcg Intravenous Once Ruben Friend PA-C    fentanyl citrate (PF) 50 mcg Intravenous Q1H PRN Ruben Friend PA-C    heparin (porcine) 5,000 Units Subcutaneous Q8H [address] Ruben Friend PA-C    magnesium sulfate 4 g Intravenous Once Meg Spironello V, CRNP    multi-electrolyte 50 mL/hr Intravenous Continuous Mattie Londono PA-C Last Rate: 50 mL/hr (10/16/18 1115)   mupirocin 1 application Nasal R07V [address] Ruben Friend PA-C    ondansetron 4 mg Intravenous Q6H PRN Ruben Friend PA-C    pantoprazole 40 mg Oral Early Morning Ruben Friend PA-C    polyethylene glycol 17 g Oral Daily Ruben Friend PA-C    potassium chloride 20 mEq Intravenous Q1H PRN Reginolfara Londono PA-C        VTE Pharmacologic Prophylaxis: Heparin  VTE Mechanical Prophylaxis: sequential compression device    Continuous Infusions:    multi-electrolyte 50 mL/hr Last Rate: 50 mL/hr (10/16/18 111)     PRN Meds:    acetaminophen 650 mg Q4H PRN   acetaminophen 650 mg Q4H PRN   bisacodyl 10 mg Daily PRN   fentanyl citrate (PF) 50 mcg Q1H PRN   ondansetron 4 mg Q6H PRN   potassium chloride 20 mEq Q1H PRN       Vitals:   Vitals:    10/17/18 0300 10/17/18 0400 10/17/18 0500 10/17/18 0600   BP:       Pulse: 82 82 80 84   Resp: 21 19 (!) 9 22   Temp: 100 °F (37 8 °C) 100 °F (37 8 °C)  100 °F (37 8 °C)   TempSrc:  Probe     SpO2: 98% 98% 97% 97%   Weight:   76 9 kg (169 lb 8 5 oz)    Height:         Arterial Line BP: 116/44  Arterial Line MAP (mmHg): 72 mmHg    Tele Rhythm: V-Paced This was personally reviewed by myself  Respiratory:  SpO2: SpO2: 97 %, SpO2 Activity:  , SpO2 Device: O2 Device: None (Room air) (o2 started at 2lpm nc on adm)       Temperature: Temp (24hrs), Av 2 °F (36 8 °C), Min:96 6 °F (35 9 °C), Max:100 °F (37 8 °C)  Current: Temperature: 100 °F (37 8 °C)    Weights:   Weight (last 2 days)     Date/Time   Weight    10/17/18 0500  76 9 (169 53)    10/16/18 0847  82 1 (181)            IBW: 57 kg  Body mass index is 28 21 kg/m²      Hemodynamic Monitoring:  N/A       Intake and Outputs:    Intake/Output Summary (Last 24 hours) at 10/17/18 0714  Last data filed at 10/17/18 0704   Gross per 24 hour   Intake          2358 15 ml   Output             2675 ml   Net          -316 85 ml     I/O last 24 hours: In: 2358 2 [I V :1940 7; IV Piggyback:417 5]  Out: 1203 [Urine:2675]    UOP: 50-100mL/hour     Labs:    Results from last 7 days  Lab Units 10/17/18  0434 10/16/18  1826 10/16/18  1117  10/11/18  0927   WBC Thousand/uL 14 72*  --   --   --  10 51*   HEMOGLOBIN g/dL 10 4* 11 4* 10 5*  --  13 6   I STAT HEMOGLOBIN   --   --   --   < >  --    HEMATOCRIT % 33 6* 36 5 33 4*  < > 42 6   PLATELETS Thousands/uL 323  --  349  --  447*   < > = values in this interval not displayed  Results from last 7 days  Lab Units 10/17/18  0434 10/17/18  0130 10/16/18  1825 10/16/18  1117 10/16/18  1050 10/16/18  1030 10/16/18  0954  10/11/18  0927   SODIUM mmol/L 136  --   --  137  --   --   --   --  138   POTASSIUM mmol/L 3 8 3 7 3 4* 3 1*  --   --   --   --  3 3*   CHLORIDE mmol/L 103  --   --  103  --   --   --   --  98*   CO2 mmol/L 29  --   --  27  --   --   --   --  38*   BUN mg/dL 10  --   --  12  --   --   --   --  14   CREATININE mg/dL 0 70  --   --  0 75  --   --   --   --  0 87   CALCIUM mg/dL 7 1*  --   --  7 3*  --   --   --   --  8 4   ALK PHOS U/L  --   --   --   --   --   --   --   --  124*   ALT U/L  --   --   --   --   --   --   --   --  26   AST U/L  --   --   --   --   --   --   --   --  20   GLUCOSE, ISTAT mg/dl  --   --   --   --  122 122 111  < >  --    < > = values in this interval not displayed  Baseline creat 0 7      Results from last 7 days  Lab Units 10/17/18  0434   MAGNESIUM mg/dL 1 6       Results from last 7 days  Lab Units 10/11/18  0927   INR  1 04     AM CXR: s/p dual chamber PPM  RIJ with tip in RA  Mild pulmonary vascular congestion  This was personally reviewed by myself  AM EKG: A sensing V paced at 81  ST depressions II, III, aVF and V4-V6 which are slightly improved from post-op EKG  This was personally reviewed by myself  Physical Exam:    General Appearance:  Well developed  HENT: normocephalic, atraumatic  Eyes: no icterus      Cardiac: paced rate and rhythm, no murmur  Pulmonary: lungs diminished to auscultation bilaterally  Gastrointestinal: bowel sounds present, no distention,  Non-tender  : Rogers present: yes   Musculoskeletal: Trace edema bilaterally  +2 bilateral DP pulses  Neuro:  Screening neurologic exam reveals no obvious deficits  follows commands  Psych: Mood and affect normal     Skin: warm, dry, intact  Incisions: Inguinal incision dressed with Mepilex AG  No erythema or drainage  Invasive lines and devices: Invasive Devices     Central Venous Catheter Line            CVC Central Lines 10/16/18 Triple less than 1 day          Peripheral Intravenous Line            Peripheral IV 10/16/18 Left Wrist less than 1 day          Arterial Line            Arterial Line 10/16/18 Right Radial less than 1 day          Drain            Urethral Catheter Temperature probe;Non-latex 16 Fr  less than 1 day          Airway            ETT  Hi-Lo 7 mm less than 1 day                Assessment:  Principal Problem:     Aortic stenosis, severe  Active Problems:    S/p TAVR (transcatheter aortic valve replacement), bioprosthetic    Acute postoperative pulmonary insufficiency (HCC)    CHB (complete heart block) (HCC)    HLD (hyperlipidemia)    Presence of permanent cardiac pacemaker    Hypertension    CHF (congestive heart failure) (HCC)    Coronary artery disease involving native coronary artery of native heart    CAD (coronary artery disease)    Hyperlipidemia    Anxiety    History of Hodgkin's lymphoma    S/P pericardial window creation      Plan:    Cardiac:    Cardiac infusions: None    D/C arterial line    MAP goal >65 but SBP goal <130  A sensing; V Pacing; HR/BP well-controlled  Start lopressor 12 5 mg PO BID  Continue ASA and statin therapy  ASA/Plavix  Consult cardiology for postoperative medical management  Follow up transthoracic echocardiogram today  Continue DVT prophylaxis    Pulmonary:    Adequate room air saturation, SpO2 goal >92%   Continue IS and deep breathing exercises  Renal:   Trend BUN and creat  Trend UOP  D/C Rogers  Lasix 40mg PO daily (home dose)     Neuro:  Neurologically intact  Continue prn Tylenol for pain  Continue home Celexa     GI:  Start Cardiac TLC 2 3 gm sodium diet with 1800 mL fluid restriction  Continue bowel regimen  Continue GI prophylaxis with PPI    Endo:    Blood glucose well controlled, no hx of diabetes     Hematology:   Trend hgb and platelets PRN    Disposition:  Transfer from ICU to telemetry today  Discharge home tomorrow when postoperative echocardiogram clear    Collaborative bedside rounds performed with cardiac surgery attending and bedside RN      SIGNATURE: PAULETTE Gan  DATE: October 17, 2018  TIME: 7:14 AM

## 2018-10-17 NOTE — PLAN OF CARE
Problem: PHYSICAL THERAPY ADULT  Goal: Performs mobility at highest level of function for planned discharge setting  See evaluation for individualized goals  Treatment/Interventions: Functional transfer training, LE strengthening/ROM, Elevations, Therapeutic exercise, Endurance training, Equipment eval/education, Bed mobility, Gait training, Spoke to nursing, Spoke to case management  Equipment Recommended: Diana Morris (at this time; will cont to monitor progress)       See flowsheet documentation for full assessment, interventions and recommendations  Outcome: Progressing  Prognosis: Good  Problem List: Decreased strength, Decreased endurance, Impaired balance, Decreased mobility    Assessment: Additional follow up consecutive session performed to initiate LE therex in order to max strength and to facilitate progression w/ functional mobility skills and overall level of (I) and to initiate HEP; pt was able to complete the program w/ no excessive fatigue expressed; O2 sat at 100-98 % while on suppl O2; pt appeared to be comfortable at the end of session; (L) UE precautions reviewed --> pt expressed understanding; pt cont to indicate she will return home upon D/C; spouse is present and aware; home PT follow up is recommended; will follow  Barriers to Discharge: Inaccessible home environment, Decreased caregiver support     Recommendation: Home PT, Home with family support (pt declines rehab;1st floor set-up at least initially at D/C)          See flowsheet documentation for full assessment

## 2018-10-17 NOTE — PHYSICAL THERAPY NOTE
Physical Therapy Evaluation     Patient's Name: Sindy Leger    Admitting Diagnosis  Aortic stenosis, severe [I35 0]    Problem List  Patient Active Problem List   Diagnosis    [facility]F [facility]congestive heart failure) ([facility] )    Hypertension    Moderate to severe aortic stenosis    Aortic stenosis, severe    Coronary artery disease involving native coronary artery of native heart    HLD (hyperlipidemia)    History of Hodgkin's lymphoma    Anxiety    S/P pericardial window creation    Acute postoperative pulmonary insufficiency ([facility] )    S/p TAVR (transcatheter aortic valve replacement), bioprosthetic    CAD (coronary artery disease)    Hyperlipidemia    CHB (complete heart block) (HCC)    Presence of permanent cardiac pacemaker       Past Medical History  Past Medical History:   Diagnosis Date    Anxiety     CAD (coronary artery disease)     CHF (congestive heart failure) (HCC)     Colon tumor     s/p resection    Hodgkin's disease ([facility] )     age 7, s/p radiation & chemotherapy    Hyperlipidemia     Hypertension     LBBB (left bundle branch block)     Severe aortic stenosis        Past Surgical History  Past Surgical History:   Procedure Laterality Date    CARDIAC CATHETERIZATION      COLON SURGERY      non cancerous tumor    NECK SURGERY      PERICARDIAL WINDOW      TN ECHO TRANSESOPHAG R-T 2D W/PRB IMG ACQUISJ I&R N/A 10/16/2018    Procedure: TRANSESOPHAGEAL ECHOCARDIOGRAM (MICHAEL); Surgeon: Jessica Almanza MD;  [facility]: [facility];  Service: Cardiac Surgery    TN REPLACE AORTIC VALVE OPENFEMORAL ARTERY APPROACH N/A 10/16/2018    Procedure: REPLACEMENT AORTIC VALVE TRANSCATHETER (TAVR) TRANSFEMORAL W/ 23 MM DE LA ROSA LEODAN S3 VALVE (ACCESS ON LEFT);   Surgeon: Jessica Almanza MD;  [facility];  Service: Cardiac Surgery    REPLACEMENT AORTIC VALVE TRANSCATHETER (TAVR)      TOTAL ABDOMINAL HYSTERECTOMY      TUMOR REMOVAL          10/17/18 1440   Note Type   Note type Eval/Treat   Pain Assessment   Pain Assessment 0-10   Pain Score 8   Pain Type Acute pain;Surgical pain   Pain Location Chest;Incision   Pain Orientation Left   Pain Descriptors Aching;Discomfort   Pain Frequency Constant/continuous   Pain Onset Ongoing   Clinical Progression Not changed   Effect of Pain on Daily Activities guarding   Patient's Stated Pain Goal No pain   Hospital Pain Intervention(s) Repositioned;Distraction;Elevated; Emotional support   Response to Interventions comfortable post session   Pain Rating: FLACC (Rest) - Face 1   Pain Rating: FLACC (Rest) - Legs 0   Pain Rating: FLACC (Rest) - Activity 0   Pain Rating: FLACC (Rest) - Cry 1   Pain Rating: FLACC (Rest) - Consolability 1   Score: FLACC (Rest) 3   Home Living   Type of Home House   Home Layout Two level; Able to live on main level with bedroom/bathroom  (flight of steps to the 2nd floor w/ hand rail; 4 ANDRE w/ HR)   Prior Function   Level of [place] Independent with ADLs and functional mobility  (amb w/o AD)   Lives With Spouse;Son  (spouse works in [address] [place] [address] [employer]; 2 sons;one of the sons is home schooled)   Falls in the last 6 months 0   Restrictions/Precautions   Braces or Orthoses (denies at home; sling noted to be off)   Other Precautions Telemetry;O2;Fall Risk;Cardiac/sternal  ((L) UE post PPM placement)   General   Additional Pertinent History cleared for assessment (spoke to nsg)   Family/Caregiver Present Yes   Cognition   Overall Cognitive Status WFL   Arousal/Participation Alert   Orientation Level Oriented to person;Oriented to place;Oriented to situation   Memory Within functional limits   Following Commands Follows one step commands without difficulty   Comments Pt is in the chair; agreeable to mobilize; denies dizziness;   RUE Assessment   RUE Assessment WFL  (AROM)   LUE Assessment   LUE Assessment X  (shld not tested; elbow and fingers AROM = WFL)   RLE Assessment   RLE Assessment WFL  (AROM)   Strength RLE   RLE Overall Strength (fair +/ good - (grossly))   LLE Assessment   LLE Assessment WFL  (AROM)   Strength LLE   LLE Overall Strength (fair +/ good - (grossly))   Transfers   Sit to Stand 4  Minimal assistance   Additional items Assist x 1;Verbal cues   Stand to Sit 4  Minimal assistance   Additional items Assist x 1;Verbal cues   Additional Comments Pillow placed for (L) UE support   Ambulation/Elevation   Gait pattern Excessively slow; Short stride; Inconsistent bertin   Gait Assistance 4  Minimal assist   Additional items Assist x 1;Verbal cues; Tactile cues  (stand by (A) of 2nd for lines)   Assistive Device Rolling walker   Distance 310 ft   Balance   Static Sitting Fair   Static Standing Fair -   Ambulatory Poor +   Activity Tolerance   Activity Tolerance Patient limited by fatigue   Nurse Made Aware spoke to Yin Jorgensen RN   Assessment   Prognosis Good   Problem List Decreased strength;Decreased endurance; Impaired balance;Decreased mobility   Assessment Pt is 48 y o  female admitted with Dx of Aortic stenosis, severe and underwent TAVR on 10/16/2018; post op course included CHB w/ RBBB --> pt underwent PPM placement on 10/16/2018  Pt 's comorbidities affecting POC include: anxiety, CA, CHF, and HTN personal factors of: ANDRE, steps in the house and ? level of support available at home  Pt's clinical presentation is currently unstable/unpredictable which is evident in ongoing telem monitoring while in a critical care unit, abn lab values being monitored, suppl O2 needs, postop pain and guarding w/ (L) UE precautions post PPM, and need for min assist w/ all phases of mobility when usually mobilizing independently  Pt presents w/ generalized weakness, incl decreased LE strength, decreased functional endurance and activity tolerance, min impaired balance w/ gait deviations (rw is being utilized at this time) and fall risk   Will cont to follow pt in PT for progressive mobilization to address above functional deficits and to max level of (I), endurance, and safety  Currently, pt declines rehab upon D/C and feels she will be able to return home with available family support; home PT follow up is therefore recommended; will follow  Barriers to Discharge Inaccessible home environment;Decreased caregiver support   Goals   Patient Goals to return home   STG Expiration Date 10/27/18   Short Term Goal #1 7-10 days  Pt will amb 400 ft w/ least restrictive assistive device PRN, mod (I) in order to facilitate safe return to premorbid environment and community amb status  Pt will negotiate 4 --> 14 steps w/ hand rail, mod (I) in order to navigate in and out and between levels of home environment safely  Pt will achieve (I) level w/ bed mob in order to facilitate safety with OOB and back to bed transitions in own living environment  Pt will perform transfers w/ mod (I) to assure (I) and safety w/ functional mobility/transitions w/ all aspects of mobility/locomotion  Pt will participate in LE therex and balance activities to max progression w/ mobility skills  Treatment Day 0   Plan   Treatment/Interventions Functional transfer training;LE strengthening/ROM; Elevations; Therapeutic exercise; Endurance training;Equipment eval/education; Bed mobility;Gait training;Spoke to nursing;Spoke to case management   PT Frequency Other (Comment)  (4-6x/wk)   Recommendation   Recommendation Home PT; Home with family support  (pt declines rehab;1st floor set-up at least initially at D/C)   Equipment Recommended Walker  (at this time; will cont to monitor progress)   Modified [place] Scale   Modified [place] Scale 4   Barthel Index   Feeding 10   Bathing 0   Grooming Score 0   Dressing Score 5   Bladder Score 10   Bowels Score 10   Toilet Use Score 5   Transfers (Bed/Chair) Score 10   Mobility (Level Surface) Score 10   Stairs Score 0   Barthel Index Score 60     Lauri Zambrano, PT

## 2018-10-17 NOTE — CONSULTS
Consultation - Cardiology Team One  Sun Brown 48 y o  female MRN: [id number]  Unit/Bed#: [facility] 418-01 Encounter: [id number]    Inpatient consult to Cardiology  Consult performed by: Nathaniel Jaquez ordered by: Rossana Fragoso      Physician Requesting Consult: Tiarra Smith MD  Reason for Consult / Principal Problem: s/p TAVR      Assessment    1  Severe symptomatic aortic stenosis   2  CHB  3  Hypertension 106/47  4  Hyperlipidemia  5  Pericardial effusion s/p window 2011    Plan    POD #1 s/p TAVR with a 23 mm Luo LEODAN 3 bioprosthetic valve  Patient developed CHB post operatively which led to asystole  CPR was started  Patient had ROSC in approximately 30 seconds  Emergent TV PM placed and later a DC Medtronic PPM 10/16/18  Device check was normal  Chest xray showed no pneumothorax  Reviewed telemetry and ECG showing V paced/NSR  Continue DAPT: Aspirin and plavix  Diuretics per primary team  On furosemide 40 mg IV daily   I/Os -416 ml   Daily weights: 169 lbs   Continue amlodipine  /47  Echocardiogram pending   Will continue to follow     History of Present Illness   HPI: Sun Brown is a 48y o  year old female who has a history of severe symptomatic aortic stenosis, hypertension, hyperlipidemia, pericardial effusion s/p pericardial window, chronic diastolic heart failure  and anxiety  She follows with cardiologist Dr Marlee Pineda  She [facility] for elective TAVR  She is POD #1 s/p TAVR with a 23 mm Luo LEODAN 3 bioprosthetic valve  Post operatively patient developed CHB and went asystole  She had approximately 30 seconds of CPR and had ROSC  A emergent temp wire was placed  She later had dual chamber Medtronic PPM placed  No complications  Cardiac cath 9/4/18 showed severe aortic stenosis and non obstructive CAD  MICHAEL 10/16/18 showed EF 50-55%, severe aortic stenosis, mild MR and mild TR       EKG reviewed personally:  Normal sinus rhythm Physical Therapy Daily Treatment     Visit Count: 5  Plan of Care Dates: Initial: 6/21/2017 Through: 8/2/2017  Insurance Information: MEDICARE A AND B ~  PT/ST - $ 78.33          OT - $ 0     MA Standard - No MA Paperwork needed with  [employer].  Next Referring Provider Visit: 6/26/17     Referred by: Oleg Adamson MD  Medical Diagnosis (from order):  Acute low back pain without sciatica, unspecified back pain laterality [M54.5]  - Primary   Treatment Diagnosis: Back Symptoms with Pain, Impaired Posture, Impaired Joint Mobility, Impaired Range of Motion, Impaired Gait/Locomotion Deficits and Impaired Mobility  Insurance: 1. MEDICARE  2. MEDICAID [id number]     Date of Onset: chronic pain with progressive worsening over past few months   Diagnosis Precautions: history of multiple lumbar surgeries including fusion  Chart reviewed: Relevant co-morbidities, allergies, tests and medications: x ray     SUBJECTIVE   Actually feeling pretty good today.  Even did some vacuuming today and went okay, holds onto the couch to help her stay upright.      Current Pain: 4/10, \"can feel the pull\"    Functional Change: None noted    OBJECTIVE       Treatment   Therapeutic Exercise: x42 minutes  Exercise Repetitions Sets Position/Cues   Nustep  6 mins   WL 4   Supine hip flexor stretch  30 secs  3    Transverse abdominas bracing +straight leg raise  10 1 Bilateral, 2#    Bridging +Transverse abdominas bracing 10 1    Sidelying hip abduction  10 1    Sidelying hip clamshells  - 1 Vs yellow theraband   Prone hip extension  - 1 Tight on right    Prone hip flexor stretch  - 3 Cues to keep hips on mat table   Sitting long arc quad +Transverse abdominas bracing  10 1    Standing hip extension  10 1    Standing: hip abduction 10 1    Standing: squats 10 1    Comments: completed bilaterally unless noted    Current Home Program (not performed this date except as noted above):   Exercise: Date issued Date DC Comments   Supine hip flexor stretch       Transverse abdominas bracing      Clam shell                         ASSESSMENT   Improved standing tolerance today, able to progress strengthening without an increase in pain.  Posture slowly improving, continues with increased lumbar lordosis.  Pain after treatment: 3/10, left side low back  Result of above outlined education: Verbalizes understanding and Needs reinforcement    Goals:       To be obtained by end of this plan of care:  1. Patient independent with modified and progressed home exercise program.  2. Patient will report decreased lumbar pain/symptoms to 2/10 to aid in ambulating 30 minutes for independent living.   3. Patient will increase lumbar active range of motion to within functional limits including neutral extension to aid in sitting/standing for 30 minutes to cook/eat a meal.  4. Patient will increase involved strength to 5/5 to aid in normalization of gait for independent living.  5. Patient will be able to bend and lift for activities of daily living and instrumental activities of daily living completion at home and work with minimal pain/difficulty.  6. Oswestry: Patient will complete form to reflect an improved score from initial score of 18 to less than or equal to 0% (0-20% = minimal disability; 20-40% = moderate disability; 40-60% = severe disability; 60-80% = crippled; % = bed bound) to indicate pt reported improvement in function/disability/impairment (minimal detectable change: 12%).     PLAN   Continue focus on iliopsoas stretching bilaterally as well as hip abductor/extensor strengthening    THERAPY DAILY BILLING   Primary Insurance: MEDICARE  Secondary Insurance: MEDICAID [id number]    Evaluation Procedures:  No evaluation codes were used on this date of service    Timed Procedures:  Therapeutic Exercise, 42 minutes    Untimed Procedures:  No untimed codes were used on this date of service    Total Treatment Time: 43 minutes    G-Code:  G-Code Score ABN form  reporting not  with ventricular pacing   Ventricular rate 81  IN interval 150 ms  QRSD Interval 125 ms  QT interval 479 ms  QTc interval 557    Telemetry reviewed personally:  Normal sinus rhythm/ V paced     Review of Systems   Constitution: Negative for chills and fever  Cardiovascular: Negative for chest pain, dyspnea on exertion and leg swelling  Respiratory: Negative for shortness of breath  Gastrointestinal: Negative for nausea and vomiting  Neurological: Negative for dizziness and light-headedness  Historical Information   Past Medical History:   Diagnosis Date    Anxiety     CAD (coronary artery disease)     CHF (congestive heart failure) (HCC)     Colon tumor     s/p resection    Hodgkin's disease ([facility] [address] )     age 7, s/p radiation & chemotherapy    Hyperlipidemia     Hypertension     LBBB (left bundle branch block)     Severe aortic stenosis      Past Surgical History:   Procedure Laterality Date    CARDIAC CATHETERIZATION      COLON SURGERY      non cancerous tumor    NECK SURGERY      PERICARDIAL WINDOW      IN ECHO TRANSESOPHAG R-T 2D W/PRB IMG ACQUISJ I&R N/A 10/16/2018    Procedure: TRANSESOPHAGEAL ECHOCARDIOGRAM (MICHAEL); Surgeon: Franklin Rice MD;  [facility];  Service: Cardiac Surgery    IN REPLACE AORTIC VALVE OPENFEMORAL ARTERY APPROACH N/A 10/16/2018    Procedure: REPLACEMENT AORTIC VALVE TRANSCATHETER (TAVR) TRANSFEMORAL W/ 23 MM DE LA ROSA LEODAN S3 VALVE (ACCESS ON LEFT);   Surgeon: Franklin Rice MD;  [facility];  Service: Cardiac Surgery    REPLACEMENT AORTIC VALVE TRANSCATHETER (TAVR)      TOTAL ABDOMINAL HYSTERECTOMY      TUMOR REMOVAL       History   Alcohol Use No     History   Drug Use No     History   Smoking Status    Never Smoker   Smokeless Tobacco    Never Used     Family History:   Family History   Problem Relation Age of Onset    Hyperlipidemia Mother     Hypertension Mother     Diabetes Mother      Meds/Allergies   all current active meds have been reviewed and current meds:   Current Facility-Administered Medications   Medication Dose Route Frequency    acetaminophen (TYLENOL) tablet 650 mg  650 mg Oral Q4H PRN    amLODIPine (NORVASC) tablet 10 mg  10 mg Oral Daily    aspirin chewable tablet 81 mg  81 mg Oral Daily    atorvastatin (LIPITOR) tablet 80 mg  80 mg Oral Daily With Dinner    bisacodyl (DULCOLAX) rectal suppository 10 mg  10 mg Rectal Daily PRN    ceFAZolin (ANCEF) IVPB (premix) 2,000 mg  2,000 mg Intravenous Q8H    citalopram (CeleXA) tablet 40 mg  40 mg Oral Daily    clopidogrel (PLAVIX) tablet 75 mg  75 mg Oral Daily    docusate sodium (COLACE) capsule 100 mg  100 mg Oral BID    fentanyl citrate (PF) 100 MCG/2ML 50 mcg  50 mcg Intravenous Once    furosemide (LASIX) tablet 40 mg  40 mg Oral Daily    heparin (porcine) subcutaneous injection 5,000 Units  5,000 Units Subcutaneous Q8H [address]    mupirocin (BACTROBAN) 2 % nasal ointment 1 application  1 application Nasal H32O [address]    ondansetron (ZOFRAN) injection 4 mg  4 mg Intravenous Q6H PRN    oxyCODONE (ROXICODONE) IR tablet 5 mg  5 mg Oral Q4H PRN    pantoprazole (PROTONIX) EC tablet 40 mg  40 mg Oral Early Morning    polyethylene glycol (MIRALAX) packet 17 g  17 g Oral Daily     No Known Allergies    Objective   Vitals: Blood pressure (!) 96/44, pulse 76, temperature 99 7 °F (37 6 °C), temperature source Probe, resp  rate 17, height 5' 5" (1 651 m), weight 76 9 kg (169 lb 8 5 oz), SpO2 99 %  ,     Body mass index is 28 21 kg/m²  ,     Systolic (77NHJ), WZD:936 , Min:96 , OIZ:677     Diastolic (09NLC), FDS:80, Min:44, Max:50      Intake/Output Summary (Last 24 hours) at 10/17/18 1127  Last data filed at 10/17/18 0900   Gross per 24 hour   Intake          2458  15 ml   Output             2675 ml   Net          -216 85 ml     Weight (last 2 days)     Date/Time   Weight    10/17/18 0500  76 9 (169 53)    10/16/18 0847  82 1 (181)            Invasive Devices     Central Venous Catheter required this treatment session  Modifier based on outcome measure(s)/functional testing/clinical judgement as listed above    The referring provider's electronic or written signature on the evaluation authorizes the therapy plan of care and certifies the need for these services, furnished under this plan of care while under their care.  Physician Signature on file.      Line            CVC Central Lines 10/16/18 Triple 1 day          Peripheral Intravenous Line            Peripheral IV 10/16/18 Left Wrist 1 day          Arterial Line            Arterial Line 10/16/18 Right Radial 1 day          Drain            Urethral Catheter Temperature probe;Non-latex 16 Fr  1 day          Airway            ETT  Hi-Lo 7 mm 1 day              Physical Exam   Constitutional: She is oriented to person, place, and time  No distress  Neck: Neck supple  Cardiovascular: Normal rate, regular rhythm and intact distal pulses  Exam reveals friction rub  LACW PPM dsg CDI    Pulmonary/Chest: Effort normal  No respiratory distress  She has no wheezes  Decreased in bases   NC    Abdominal: Soft  Bowel sounds are normal    Musculoskeletal: She exhibits no edema  Neurological: She is alert and oriented to person, place, and time  Skin: Skin is warm and dry  She is not diaphoretic  Psychiatric: She has a normal mood and affect   Her behavior is normal      LABORATORY RESULTS:      CBC with diff:   Results from last 7 days  Lab Units 10/17/18  0434 10/16/18  1826 10/16/18  1117 10/16/18  1050 10/16/18  1030 10/16/18  0954 10/11/18  0959 10/11/18  0927   WBC Thousand/uL 14 72*  --   --   --   --   --   --  10 51*   HEMOGLOBIN g/dL 10 4* 11 4* 10 5*  --   --   --   --  13 6   I STAT HEMOGLOBIN g/dl  --   --   --  9 9* 9 9* 11 2* 12 2  --    HEMATOCRIT % 33 6* 36 5 33 4* 29* 29* 33* 36 42 6   MCV fL 88  --   --   --   --   --   --  88   PLATELETS Thousands/uL 323  --  349  --   --   --   --  447*   MCH pg 27 4  --   --   --   --   --   --  28 2   MCHC g/dL 31 0*  --   --   --   --   --   --  31 9   RDW % 15 3*  --   --   --   --   --   --  15 0   MPV fL 10 3  --  10 3  --   --   --   --  10 3       CMP:  Results from last 7 days  Lab Units 10/17/18  0434 10/17/18  0130 10/16/18  1825 10/16/18  1117 10/16/18  1050 10/16/18  1030 10/16/18  0954 10/11/18  0959 10/11/18  0927   SODIUM mmol/L 136  --   -- 137  --   --   --   --  138   POTASSIUM mmol/L 3 8 3 7 3 4* 3 1*  --   --   --   --  3 3*   CHLORIDE mmol/L 103  --   --  103  --   --   --   --  98*   CO2 mmol/L 29  --   --  27  --   --   --   --  38*   BUN mg/dL 10  --   --  12  --   --   --   --  14   CREATININE mg/dL 0 70  --   --  0 75  --   --   --   --  0 87   GLUCOSE, ISTAT mg/dl  --   --   --   --  122 122 111 111  --    CALCIUM mg/dL 7 1*  --   --  7 3*  --   --   --   --  8 4   AST U/L  --   --   --   --   --   --   --   --  20   ALT U/L  --   --   --   --   --   --   --   --  26   ALK PHOS U/L  --   --   --   --   --   --   --   --  124*   EGFR ml/min/1 73sq m 101  --   --  93  --   --   --   --  77       BMP:  Results from last 7 days  Lab Units 10/17/18  0434 10/17/18  0130 10/16/18  1825 10/16/18  1117 10/16/18  1050  10/11/18  0927   SODIUM mmol/L 136  --   --  137  --   --  138   POTASSIUM mmol/L 3 8 3 7 3 4* 3 1*  --   --  3 3*   CHLORIDE mmol/L 103  --   --  103  --   --  98*   CO2 mmol/L 29  --   --  27  --   --  38*   BUN mg/dL 10  --   --  12  --   --  14   CREATININE mg/dL 0 70  --   --  0 75  --   --  0 87   GLUCOSE, ISTAT mg/dl  --   --   --   --  122  < >  --    CALCIUM mg/dL 7 1*  --   --  7 3*  --   --  8 4   < > = values in this interval not displayed         Lab Results   Component Value Date    NTBNP 1,303 (H) 08/01/2018            Results from last 7 days  Lab Units 10/17/18  0434   MAGNESIUM mg/dL 1 6          Results from last 7 days  Lab Units 10/11/18  0927   HEMOGLOBIN A1C % 6 8*                Results from last 7 days  Lab Units 10/11/18  0927   INR  1 04     Lipid Profile:   No results found for: CHOL  Lab Results   Component Value Date    HDL 39 (L) 08/02/2018     Lab Results   Component Value Date    LDLCALC 88 08/02/2018     Lab Results   Component Value Date    TRIG 142 08/02/2018         Cardiac testing:   Results for orders placed during the hospital encounter of 08/01/18   Echo complete with contrast if indicated Narrative [address] [place] [id number]  [phone]    Transthoracic Echocardiogram  2D, M-mode, Doppler, and Color Doppler    Study date:  02-Aug-2018    Patient: Osvaldo Cool  MR number: [id number]  Account number: [de-identified]  : 1965  Age: 50 years  Gender: Female  Status: Inpatient  Location: Emergency room  Height: 64 in  Weight: 176 lb  BP: 114/ 59 mmHg    Indications: Dyspnea, wheezing, tachypnea, Shortness of breath  Diagnoses: R06 00 - Dyspnea, unspecified, R06 02 - Shortness of breath    Sonographer:   [facility] Nina Mckeon  Interpreting Physician:  Timothy Hollis MD  Referring Physician:  PAULETTE Elizabeth  Group:  [facility]    SUMMARY    PROCEDURE INFORMATION:  This was a technically difficult study  Echocardiographic views were limited due to poor acoustic window availability, decreased penetration, and lung interference  LEFT VENTRICLE:  Systolic function was at the lower limits of normal  Ejection fraction was estimated in the range of 50 % to 55 %  There were no regional wall motion abnormalities  Left ventricular diastolic function parameters were normal     RIGHT VENTRICLE:  The size was normal   Systolic function was normal     MITRAL VALVE:  There was moderate annular calcification  There was mild stenosis  There was trace regurgitation  AORTIC VALVE:  The valve was trileaflet  Leaflets exhibited increased thickness and calcification with reduced cuspal separation  Transaortic velocity was increased due to valvular stenosis  There was moderate to severe stenosis  Peak and mean AV gradients were 50 and 31 mm Hg respectively  GIL by the continuity equation method was 0 6 sq cm  There was mild regurgitation  TRICUSPID VALVE:  There was trace regurgitation    Pulmonary artery systolic pressure was at the upper limits of normal     PULMONIC VALVE:  There was mild to moderate regurgitation  HISTORY: PRIOR HISTORY: Abnormal EKG, Cancer, Congestive heart failure  Risk factors: hypertension  PROCEDURE: The procedure was performed in the emergency room  This was a routine study  The transthoracic approach was used  The study included complete 2D imaging, M-mode, complete spectral Doppler, and color Doppler  The heart rate was  82 bpm, at the start of the study  Images were obtained from the parasternal, apical, subcostal, and suprasternal notch acoustic windows  Echocardiographic views were limited due to poor acoustic window availability, decreased penetration,  and lung interference  This was a technically difficult study  LEFT VENTRICLE: Size was normal  Systolic function was at the lower limits of normal  Ejection fraction was estimated in the range of 50 % to 55 %  There were no regional wall motion abnormalities  Wall thickness was normal  No evidence of  apical thrombus  DOPPLER: Left ventricular diastolic function parameters were normal     RIGHT VENTRICLE: The size was normal  Systolic function was normal  Wall thickness was normal     LEFT ATRIUM: Size was normal     RIGHT ATRIUM: Size was normal     MITRAL VALVE: There was moderate annular calcification  There was normal leaflet separation  DOPPLER: There was mild stenosis  There was trace regurgitation  AORTIC VALVE: The valve was trileaflet  Leaflets exhibited increased thickness and calcification with reduced cuspal separation  DOPPLER: Transaortic velocity was increased due to valvular stenosis  There was moderate to severe stenosis  Peak and mean AV gradients were 50 and 31 mm Hg respectively  GIL by the continuity equation method was 0 6 sq cm  There was mild regurgitation  TRICUSPID VALVE: The valve structure was normal  There was normal leaflet separation  DOPPLER: The transtricuspid velocity was within the normal range  There was no evidence for stenosis  There was trace regurgitation   Pulmonary artery  systolic pressure was at the upper limits of normal     PULMONIC VALVE: Leaflets exhibited normal thickness, no calcification, and normal cuspal separation  DOPPLER: The transpulmonic velocity was within the normal range  There was mild to moderate regurgitation  PERICARDIUM: There was no pericardial effusion  The pericardium was normal in appearance  AORTA: The root exhibited normal size  SYSTEMIC VEINS: IVC: The inferior vena cava was normal in size  Respirophasic changes were normal     SYSTEM MEASUREMENT TABLES    2D  %FS: 26 5 %  Ao Diam: 2 7 cm  EDV(Teich): 116 2 ml  EF(Teich): 51 7 %  ESV(Teich): 56 1 ml  IVSd: 0 7 cm  LA Area: 18 3 cm2  LA Diam: 3 2 cm  LVEDV MOD A4C: 101 8 ml  LVEF MOD A4C: 44 1 %  LVESV MOD A4C: 56 9 ml  LVIDd: 5 cm  LVIDs: 3 6 cm  LVLd A4C: 7 9 cm  LVLs A4C: 6 6 cm  LVOT Diam: 1 9 cm  LVPWd: 0 8 cm  RA Area: 19 1 cm2  RVIDd: 3 3 cm  SV MOD A4C: 44 9 ml  SV(Teich): 60 ml    CW  AR Dec [place]: 3 8 m/s2  AR Dec Time: 1100 5 ms  AR PHT: 319 1 ms  AR Vmax: 4 2 m/s  AR maxP 9 mmHg  AV Env  Ti: 304 5 ms  AV VTI: 75 1 cm  AV Vmax: 3 3 m/s  AV Vmax: 3 5 m/s  AV Vmean: 2 5 m/s  AV maxP 3 mmHg  AV maxP 6 mmHg  AV meanP 2 mmHg  HR: 84 3 BPM  MV VTI: 36 6 cm  MV Vmax: 1 4 m/s  MV Vmean: 0 9 m/s  MV maxP 1 mmHg  MV meanPG: 3 6 mmHg  TR Vmax: 3 m/s  TR maxP 3 mmHg    MM  TAPSE: 1 8 cm    PW  GIL (VTI): 0 8 cm2  GIL Vmax: 0 7 cm2  GIL Vmax: 0 7 cm2  E': 0 1 m/s  E/E': 21  LVOT Env  Ti: 323 5 ms  LVOT VTI: 21 7 cm  LVOT Vmax: 0 9 m/s  LVOT Vmean: 0 7 m/s  LVOT maxPG: 3 3 mmHg  LVOT meanP mmHg  LVSV Dopp: 59 1 ml  MV A Papi: 1 m/s  MV Dec [place]: 5 4 m/s2  MV DecT: 210 1 ms  MV E Papi: 1 1 m/s  MV E/A Ratio: 1 1  MV PHT: 60 9 ms  MVA (VTI): 1 6 cm2  MVA By PHT: 3 6 cm2    [facility] Accredited Echocardiography Laboratory    Prepared and electronically signed by    Eliot Ayala MD  Signed 02-Aug-2018 19:09:33       Imaging: I have personally reviewed pertinent reports  Xr Chest Portable    Result Date: 10/17/2018  Narrative: CHEST INDICATION:   Follow-up post valve replacement  COMPARISON:  10/16/2018  EXAM PERFORMED/VIEWS:  XR CHEST PORTABLE FINDINGS:  Left chest wall pacemaker is present  Cardiac valve prosthesis is noted  Right jugular central venous catheter tip projects over the cavoatrial junction  Cardiomediastinal silhouette appears unremarkable  Subsegmental atelectasis is present lung bases  Osseous structures appear within normal limits for patient age  Impression: Bibasilar subsegmental atelectasis  Workstation performed: [id number]     Xr Chest Portable    Result Date: 10/17/2018  Narrative: CHEST INDICATION:   post pacemaker  COMPARISON:  10/16/2018  EXAM PERFORMED/VIEWS:  XR CHEST PORTABLE FINDINGS:  Left chest wall pacemaker is present  Lines and tubes are unchanged  Cardiac mediastinal silhouette is stable  Cardiac valve prosthesis is present  Mild pulmonary vascular congestion is present  Osseous structures appear within normal limits for patient age  Impression: Mild pulmonary vascular congestion  Workstation performed: [id number]     Xr Chest Portable Icu    Result Date: 10/16/2018  Narrative: CHEST INDICATION:   S/P TAVR  COMPARISON:  August 1, 2018 EXAM PERFORMED/VIEWS:  XR CHEST PORTABLE ICU Single image FINDINGS:  Lungs are suboptimally aerated  Scattered infiltrative or atelectatic changes bilaterally  No lobar consolidation or large effusion  Cardiac size mildly enlarged  Mild vascular congestion with peribronchial thickening  TAVR device projecting over the central cardiac silhouette  Endotracheal tube tip at the leonard entering right mainstem bronchus, suggest retracting the endotracheal tube 3 cm (if not already performed)  Right jugular cordis projecting over the mid SVC    Right jugular central line tip in the right atrium (suggest retracting the smaller caliber central line approximately 5 cm if not already performed)  Numerous EKG leads in place  The NG tube is in the stomach  Stable Surgical clips in the left upper abdomen  No pneumothorax  Bony structures otherwise stable     Impression: Diminished lung volumes with infiltrates or atelectasis bilaterally  No lobar consolidation or large effusion  Cardiomegaly with pulmonary edema  Endotracheal tube, central line, and jugular cordis as described  No pneumothorax  I personally discussed this study with the covering surgical physician on 10/16/2018 at 2:56 PM  Workstation performed: [id number]     Thank you for allowing us to participate in this patient's care  This pt will follow up with Dr         once discharged  Counseling / Coordination of Care  Total floor / unit time spent today 45 minutes  Greater than 50% of total time was spent with the patient and / or family counseling and / or coordination of care  A description of the counseling / coordination of care: Review of history, current assessment, development of a plan  Code Status: Level 1 - Full Code    ** Please Note: Dragon 360 Dictation voice to text software may have been used in the creation of this document   **

## 2018-10-17 NOTE — OP NOTE
OPERATIVE REPORT  PATIENT NAME: Chloe Zamudio    :  1965  MRN: [id number]  Pt Location: [facility]BR[facility] ROOM 02    SURGERY DATE: 10/16/2018    SURGEON: Tyra Mendosa MD     ASSISTANT: Ghazal Osborne DO     CO-SURGEON: Nathan Hernandez MD    PREOPERATIVE DIAGNOSIS Symptomatic severe aortic stenosis  POSTOPERATIVE DIAGNOSIS Symptomatic severe aortic stenosis  NYHA CLASS: 3    CCS CLASS: 1    PROCEDURE Transcatheter aortic valve replacement with a 23 mm Luo LEODAN 3 bioprosthetic valve via a percutaneous left transfemoral approach  ANESTHESIA Dr Glenis Sotelo and Dr Carlos Bernal, general endotracheal anesthesia with transesophageal echocardiogram guidance  CARDIOPULMONARY BYPASS TIME 0  PACKS/TUBES/DRAINS None  ESTIMATED BLOOD LOSS: 30 mL    OPERATIVE TECHNIQUE The patient was taken to the operating room and placed supine on the operating table  Following the satisfactory induction of general anesthesia and placement of monitoring lines, the patient was prepped and draped in the usual sterile fashion  A time-out procedure was performed  The right common femoral artery was accessed percutaneously using Seldinger technique and fluoroscopy  The right common femoral vein was accessed in a similar fashion and was cannulated with a 6 Western Wendy sheath  The femoral artery was cannulated with a 7 Irish sheath  A pigtail catheter was inserted and advanced through the right common femoral artery sheath into the right coronary cusp  Using a series of injections, the angle of deployment was determined  Through the right common femoral vein sheath, a balloon tip temporary pacing catheter was inserted into the right ventricle and its capture was confirmed  The left common femoral artery was accessed percutaneously using Seldinger technique and fluoroscopy  Two (2) Perclose sutures were deployed in the standard fashion The patient was systemically heparinized   The left common femoral artery was then accessed with an extra-stiff wire and the sheath was inserted through the left common femoral artery and advanced into the aorta  The aortic valve was crossed with a wire  The balloon was inserted through the sheath and positioned in the aortic annulus  During an episode of rapid pacing, balloon valvuloplasty was performed  The balloon was subsequently removed  A 23 mm LEODAN 3 valve was then advanced through the sheath into the aorta and positioned onto the deployment balloon in the aorta and then advanced around the aortic arch and through the annulus of the aortic valve to the appropriate level  At this point, the catheter was desheathed in the standard fashion  The valve was positioned appropriately using a combination of fluoroscopy and transesophageal echocardiogram guidance  During an episode of rapid pacing, balloon deployment of the 23 mm LEODAN 3 valve was performed  Following deployment, the position of the valve was confirmed by fluoroscopy and echocardiography and its position appeared appropriate with trace perivalvular leak  The valve delivery system was subsequently removed and the sheath was removed from the left common femoral artery while the Perclose sutures were secured and direct pressure was held  Protamine was administered with normalization of the ACT  Pressure was released from the left groin and there was no active bleeding and no evidence of hematoma development  The common femoral artery sheath was removed from the right following deployment of a closure device  The common femoral vein sheath was removed from the right and pressure was held  Sponge, needle, and instrument counts were reported as correct by the nursing staff  There was no qualified surgical resident available to assist  As the attending surgeon, I was present and scrubbed for all critical portions of this procedure   Final transesophageal echocardiogram demonstrated a well-positioned bioprosthetic transcatheter aortic valve with normal function and trace perivalvular leak           SIGNATURE: Delores Roman MD  DATE: October 17, 2018  TIME: 2:05 PM

## 2018-10-18 ENCOUNTER — APPOINTMENT (INPATIENT)
Dept: RADIOLOGY | Facility: HOSPITAL | Age: 53
DRG: 175 | End: 2018-10-18
Payer: COMMERCIAL

## 2018-10-18 DIAGNOSIS — I35.0 SEVERE AORTIC STENOSIS: Primary | ICD-10-CM

## 2018-10-18 LAB
ABO GROUP BLD BPU: NORMAL
ANION GAP SERPL CALCULATED.3IONS-SCNC: 6 MMOL/L (ref 4–13)
BPU ID: NORMAL
BUN SERPL-MCNC: 12 MG/DL (ref 5–25)
CALCIUM SERPL-MCNC: 7.5 MG/DL (ref 8.3–10.1)
CHLORIDE SERPL-SCNC: 100 MMOL/L (ref 100–108)
CO2 SERPL-SCNC: 30 MMOL/L (ref 21–32)
CREAT SERPL-MCNC: 0.69 MG/DL (ref 0.6–1.3)
CROSSMATCH: NORMAL
ERYTHROCYTE [DISTWIDTH] IN BLOOD BY AUTOMATED COUNT: 15.6 % (ref 11.6–15.1)
GFR SERPL CREATININE-BSD FRML MDRD: 101 ML/MIN/1.73SQ M
GLUCOSE SERPL-MCNC: 103 MG/DL (ref 65–140)
GLUCOSE SERPL-MCNC: 105 MG/DL (ref 65–140)
GLUCOSE SERPL-MCNC: 118 MG/DL (ref 65–140)
GLUCOSE SERPL-MCNC: 77 MG/DL (ref 65–140)
GLUCOSE SERPL-MCNC: 82 MG/DL (ref 65–140)
HCT VFR BLD AUTO: 33.1 % (ref 34.8–46.1)
HGB BLD-MCNC: 10 G/DL (ref 11.5–15.4)
MAGNESIUM SERPL-MCNC: 2.5 MG/DL (ref 1.6–2.6)
MCH RBC QN AUTO: 27.3 PG (ref 26.8–34.3)
MCHC RBC AUTO-ENTMCNC: 30.2 G/DL (ref 31.4–37.4)
MCV RBC AUTO: 90 FL (ref 82–98)
PLATELET # BLD AUTO: 288 THOUSANDS/UL (ref 149–390)
PMV BLD AUTO: 10.4 FL (ref 8.9–12.7)
POTASSIUM SERPL-SCNC: 3.6 MMOL/L (ref 3.5–5.3)
RBC # BLD AUTO: 3.66 MILLION/UL (ref 3.81–5.12)
SODIUM SERPL-SCNC: 136 MMOL/L (ref 136–145)
UNIT DISPENSE STATUS: NORMAL
UNIT PRODUCT CODE: NORMAL
UNIT RH: NORMAL
WBC # BLD AUTO: 13.44 THOUSAND/UL (ref 4.31–10.16)

## 2018-10-18 PROCEDURE — G8988 SELF CARE GOAL STATUS: HCPCS

## 2018-10-18 PROCEDURE — 97116 GAIT TRAINING THERAPY: CPT

## 2018-10-18 PROCEDURE — 97166 OT EVAL MOD COMPLEX 45 MIN: CPT

## 2018-10-18 PROCEDURE — 99232 SBSQ HOSP IP/OBS MODERATE 35: CPT | Performed by: PHYSICIAN ASSISTANT

## 2018-10-18 PROCEDURE — G8987 SELF CARE CURRENT STATUS: HCPCS

## 2018-10-18 PROCEDURE — 83735 ASSAY OF MAGNESIUM: CPT | Performed by: NURSE PRACTITIONER

## 2018-10-18 PROCEDURE — 80048 BASIC METABOLIC PNL TOTAL CA: CPT | Performed by: NURSE PRACTITIONER

## 2018-10-18 PROCEDURE — 94760 N-INVAS EAR/PLS OXIMETRY 1: CPT

## 2018-10-18 PROCEDURE — 97110 THERAPEUTIC EXERCISES: CPT

## 2018-10-18 PROCEDURE — 97535 SELF CARE MNGMENT TRAINING: CPT

## 2018-10-18 PROCEDURE — 71046 X-RAY EXAM CHEST 2 VIEWS: CPT

## 2018-10-18 PROCEDURE — 97530 THERAPEUTIC ACTIVITIES: CPT

## 2018-10-18 PROCEDURE — 85027 COMPLETE CBC AUTOMATED: CPT | Performed by: NURSE PRACTITIONER

## 2018-10-18 PROCEDURE — 82948 REAGENT STRIP/BLOOD GLUCOSE: CPT

## 2018-10-18 PROCEDURE — 93306 TTE W/DOPPLER COMPLETE: CPT | Performed by: INTERNAL MEDICINE

## 2018-10-18 RX ADMIN — HEPARIN SODIUM 5000 UNITS: 5000 INJECTION, SOLUTION INTRAVENOUS; SUBCUTANEOUS at 05:27

## 2018-10-18 RX ADMIN — CLOPIDOGREL BISULFATE 75 MG: 75 TABLET ORAL at 09:11

## 2018-10-18 RX ADMIN — Medication 81 MG: at 09:11

## 2018-10-18 RX ADMIN — OXYCODONE HYDROCHLORIDE 5 MG: 5 TABLET ORAL at 11:37

## 2018-10-18 RX ADMIN — MUPIROCIN 1 APPLICATION: 20 OINTMENT TOPICAL at 21:11

## 2018-10-18 RX ADMIN — PANTOPRAZOLE SODIUM 40 MG: 40 TABLET, DELAYED RELEASE ORAL at 05:27

## 2018-10-18 RX ADMIN — OXYCODONE HYDROCHLORIDE 5 MG: 5 TABLET ORAL at 01:12

## 2018-10-18 RX ADMIN — POLYETHYLENE GLYCOL 3350 17 G: 17 POWDER, FOR SOLUTION ORAL at 09:11

## 2018-10-18 RX ADMIN — AMLODIPINE BESYLATE 10 MG: 10 TABLET ORAL at 09:11

## 2018-10-18 RX ADMIN — CITALOPRAM HYDROBROMIDE 40 MG: 20 TABLET ORAL at 09:11

## 2018-10-18 RX ADMIN — OXYCODONE HYDROCHLORIDE 5 MG: 5 TABLET ORAL at 21:10

## 2018-10-18 RX ADMIN — METOPROLOL TARTRATE 12.5 MG: 25 TABLET ORAL at 09:11

## 2018-10-18 RX ADMIN — OXYCODONE HYDROCHLORIDE 5 MG: 5 TABLET ORAL at 05:27

## 2018-10-18 RX ADMIN — ACETAMINOPHEN 650 MG: 325 TABLET, FILM COATED ORAL at 08:09

## 2018-10-18 RX ADMIN — DOCUSATE SODIUM 100 MG: 100 CAPSULE, LIQUID FILLED ORAL at 17:00

## 2018-10-18 RX ADMIN — MUPIROCIN 1 APPLICATION: 20 OINTMENT TOPICAL at 09:11

## 2018-10-18 RX ADMIN — HEPARIN SODIUM 5000 UNITS: 5000 INJECTION, SOLUTION INTRAVENOUS; SUBCUTANEOUS at 21:11

## 2018-10-18 RX ADMIN — METOPROLOL TARTRATE 12.5 MG: 25 TABLET ORAL at 21:10

## 2018-10-18 RX ADMIN — FUROSEMIDE 40 MG: 40 TABLET ORAL at 09:11

## 2018-10-18 RX ADMIN — HEPARIN SODIUM 5000 UNITS: 5000 INJECTION, SOLUTION INTRAVENOUS; SUBCUTANEOUS at 14:35

## 2018-10-18 RX ADMIN — OXYCODONE HYDROCHLORIDE 5 MG: 5 TABLET ORAL at 17:00

## 2018-10-18 RX ADMIN — DOCUSATE SODIUM 100 MG: 100 CAPSULE, LIQUID FILLED ORAL at 09:11

## 2018-10-18 RX ADMIN — ATORVASTATIN CALCIUM 80 MG: 80 TABLET, FILM COATED ORAL at 17:00

## 2018-10-18 NOTE — OCCUPATIONAL THERAPY NOTE
OccupationalTherapy Evaluation & Treatment     Patient Name: Carly Palomares  CGGFQ'K Date: 10/18/2018  Problem List  Patient Active Problem List   Diagnosis    [facility]estive [facility]) [facility] [address] [facility]    Hypertension    Moderate to severe aortic stenosis    Aortic stenosis, severe    Coronary artery disease involving native coronary artery of native heart    HLD (hyperlipidemia)    History of Hodgkin's lymphoma    Anxiety    S/P pericardial window creation    Acute postoperative pulmonary insufficiency (HCC)    S/p TAVR (transcatheter aortic valve replacement), bioprosthetic    CAD (coronary artery disease)    Hyperlipidemia    CHB (complete heart block) (HCC)    Presence of permanent cardiac pacemaker     Past Medical History  Past Medical History:   Diagnosis Date    Anxiety     CAD (coronary artery disease)     CHF (congestive heart failure) (HCC)     Colon tumor     s/p resection    Hodgkin's disease ([facility] [address] )     age 7, s/p radiation & chemotherapy    Hyperlipidemia     Hypertension     LBBB (left bundle branch block)     Severe aortic stenosis      Past Surgical History  Past Surgical History:   Procedure Laterality Date    CARDIAC CATHETERIZATION      COLON SURGERY      non cancerous tumor    NECK SURGERY      PERICARDIAL WINDOW      PA ECHO TRANSESOPHAG R-T 2D W/PRB IMG ACQUISJ I&R N/A 10/16/2018    Procedure: TRANSESOPHAGEAL ECHOCARDIOGRAM (MICHAEL); Surgeon: Marylen Glad, MD;  Location: [facility];  Service: Cardiac Surgery    PA REPLACE AORTIC VALVE OPENFEMORAL ARTERY APPROACH N/A 10/16/2018    Procedure: REPLACEMENT AORTIC VALVE TRANSCATHETER (TAVR) TRANSFEMORAL W/ 23 MM DE LA ROSA LEODAN S3 VALVE (ACCESS ON LEFT);   Surgeon: Marylen Glad, MD;  Location: [facility];  Service: Cardiac Surgery    REPLACEMENT AORTIC VALVE TRANSCATHETER (TAVR)      TOTAL ABDOMINAL HYSTERECTOMY      TUMOR REMOVAL           10/18/18 0835   Note Type   Note type Eval/Treat   Restrictions/Precautions Weight Bearing Precautions Per Order No   Other Precautions Telemetry;O2;Pain  (PPM precautions)   Pain Assessment   Pain Assessment 0-10   Pain Score 8   Pain Type Acute pain;Surgical pain   Pain Location Shoulder; Chest   Pain Orientation Left   Hospital Pain Intervention(s) Ambulation/increased activity   Response to Interventions tolerated   Home Living   Type of [facility] Two level;Bed/bath upstairs   Bathroom Shower/Tub Walk-in shower   Bathroom Toilet Standard   Bathroom Equipment (none)   Home Equipment (none)   Additional Comments Pt lives in [address] [facility] w/ bedroom upstairs  Prior Function   Level of [place] Independent with ADLs and functional mobility   Lives With Spouse; Family  (son& dtr)   Receives Help From Family   ADL Assistance Independent   IADLs Independent   Falls in the last 6 months 1 to 4  (2 falls)   Lifestyle   Autonomy Pt I w/ ADLs, IADLs, and mobility  No DME use  (+)    Reciprocal Relationships Pt lives w/ spouse and 2 children  Spouse works in [place] but pt's children can assist as needed   Service to Others ?    Gwendolyn Conway Enjoys staying busy   Psychosocial   Psychosocial (WDL) WDL   ADL   Where Assessed Chair   Eating Assistance 6  Modified independent   Eating Deficit Setup   Grooming Assistance 4  Minimal Assistance   [address] [place] [address]  4  Minimal Assistance   Functional Assistance 4  Minimal Assistance   Bed Mobility   Additional Comments Pt seated OOB in recliner upon arrival   Transfers   Sit to Stand 5  Supervision   Additional items Armrests   Stand to Sit 5  Supervision   Additional items Armrests   Toilet transfer 4  Minimal assistance   Additional items Standard toilet   Balance   Static Sitting Fair +   Dynamic Sitting Fair +   Static Standing Fair   Dynamic Standing Fair   Activity Tolerance   Activity Tolerance Patient limited by fatigue;Patient limited by pain   Nurse Made Aware Okay to see per RNKelly Assessment   RUE Assessment WFL   LUE Assessment   LUE Assessment X  (PPM precautions)   Hand Function   Gross Motor Coordination Functional   Fine Motor Coordination Functional   Cognition   Overall Cognitive Status WFL   Arousal/Participation Alert; Responsive; Cooperative   Attention Within functional limits   Orientation Level Oriented X4   Memory Within functional limits   Following Commands Follows all commands and directions without difficulty   Comments Pt pleasant and agreeable to session   Assessment   Limitation Decreased ADL status; Decreased endurance;Decreased self-care trans;Decreased high-level ADLs   Prognosis Good   Assessment Pt is a 48 y o  female who was admitted to [facility] on 10/16/2018 with Aortic stenosis, severe  Pt seen in outpt office for cardiac surgery consultation and further cardiac w/u  Pt is now s/p TAVR on 10/16/18  S/p pt required unplanned PPM placement  Pt w/ active cardiac and PPM precautions  Pt's comorbidities include CHF, HTN, CAD, HLD, h/o Hodgkin's lymphoma, anxiety, and h/o CHB  At baseline pt was I w/ ADL, IADLs, and mobility  No DME use at baseline and (+)   Pt lives w/ spouse and 2 children in a 2 SH w/ pt's bedroom being upstairs  Pt reports her spouse works in [place], however her children can assist as needed Currently pt requires min A for overall ADLS and S-min A for functional mobility/transfers  Pt currently presents with impairments in the following categories -steps to enter environment, difficulty performing ADLS, difficulty performing IADLS,  activity tolerance, endurance and standing balance/tolerance   These impairments, as well as pt's fatigue, pain, cardiac/sternal precautions, decreased caregiver support and risk for falls  limit pt's ability to safely engage in all baseline areas of occupation, including grooming, bathing, dressing, toileting, functional mobility/transfers and leisure activities  Pt would benefit from cont treatment session(s) to review dressing modifications for PPM precautions and for carry over of all precautions during functional activity  Pt also would benefit from [facility] for home use  From OT standpoint, recommend home w/ family support and home OT pending progress upon D/C  OT will continue to follow to address the below stated goals  Goals   Patient Goals to go home   LTG Time Frame 7-10   Long Term Goal see below goals    Plan   Treatment Interventions ADL retraining;Functional transfer training; Endurance training;Patient/family training;Equipment evaluation/education; Compensatory technique education; Energy conservation; Activityengagement;Cardiac education   Goal Expiration Date 10/28/18   OT Frequency 3-5x/wk   Additional Treatment Session   Start Time 0820   End Time 0835   Treatment Assessment Pt participated in additional OT session focusing on cardiac education  Provided pt with Recovering After Cardiac Surgery packet and educated pt regarding; cardiac precautions, lifiting restrictions, safe activity engagement, energy conservation, lifestyle modifications, stress management and cardiac rehabilitation programs  Also educated pt on PPM precautions and activity modifications to maintain precautions during ADLs & IADLs  Pt's questions were addressed after discussion of the packet  Provided Pt with contact information for OT department if questions arrise  Pt continues to benefit from inpatient skilled OT services  Will continue to follow and address previously stated goals     Additional Treatment Day 1   Recommendation   OT Discharge Recommendation Home OT   Equipment Recommended Bedside commode   OT - OK to Discharge Yes  (when medically stable)   Barthel Index   Feeding 10   Bathing 0   Grooming Score 0   Dressing Score 5   Bladder Score 10   Bowels Score 10 Toilet Use Score 5   Transfers (Bed/Chair) Score 10   Mobility (Level Surface) Score 10   Stairs Score 0   Barthel Index Score 60   Modified Pierce Scale   Modified Pierce Scale 4     LTG (7-10 DAYS)  Pt will improve activity tolerance to G for min 30 min txment sessions to increase participation in ADLs    Pt will complete ADLs/self care w/ mod I using adaptive device and DME as needed    Pt will complete toileting w/ mod I w/ G hygiene/thoroughness using DME as needed    Pt will improve functional transfers on/off all surfaces using DME as needed w/ G balance/safety including w/ mod I    Pt will improve functional mobility during ADL/IADL/leisure tasks using DME as needed w/ G balance/safety w/ mod I    Pt will demonstrate 100% carryover of energy conservation techniques w/ mod I t/o functional I/ADL/leisure tasks w/o cues s/p skilled education    Pt will engage in cardiac education using the Recovering After Cardiac Rehabilitation packet w/ G participation and G carryover      Pt will demonstrate 100% carryover of precautions s/p review w/o cues w/ mod I w/ G tolerance/participation t/o functional ADL/IADL/leisure tasks        Karis Pete, OTR/L

## 2018-10-18 NOTE — PHYSICAL THERAPY NOTE
Physical Therapy Progress Note     10/18/18 7192   Pain Assessment   Pain Assessment 0-10   Pain Score 7   Pain Type Acute pain   Pain Location [facility]   Hospital Pain Intervention(s) Repositioned; Ambulation/increased activity; Distraction; Rest   Diversional Activities Television   Response to Interventions tolerated, slight decrease with ambulation   Restrictions/Precautions   Other Precautions Telemetry;O2;Fall Risk;Pain;Cardiac/sternal  (PPM precautions)   General   Family/Caregiver Present No   Subjective   Subjective Pt pleasant and agreeable to treatment  Stated her legs felt "tight" at beginning of session  Then complained of slight tightness in chest & soreness in neck with ambulation  Transfers   Sit to Stand 5  Supervision   Additional items Assist x 1; Armrests; Increased time required   Stand to Sit 5  Supervision   Additional items Assist x 1; Armrests; Increased time required   Toilet transfer 5  Supervision   Additional items Assist x 1; Increased time required   Ambulation/Elevation   Gait pattern Excessively slow; Short stride;Decreased foot clearance; Inconsistent bertin   Gait Assistance 5  Supervision   Additional items Assist x 1   Assistive Device [facility]   Distance 100' on RA, 150', 200', 150', 100', 150' all on 1 5 L O2 nc   Stair Management Assistance 5  Supervision   Additional items Assist x 1   Stair Management Technique One rail R;With cane; Foreward; Step to pattern   Number of Stairs 3   Balance   Static Sitting Fair +   Static Standing Fair   Ambulatory Fair -   Endurance Deficit   Endurance Deficit Yes   Endurance Deficit Description pt on 1 5L O2 nc during session, incresed dyspnea with ambulation   Activity Tolerance   Activity Tolerance Patient tolerated treatment well;Patient limited by fatigue   Nurse Екатерина Bell, RN   Assessment   Prognosis Good   Problem List Decreased strength;Decreased endurance; Impaired balance;Decreased mobility;Pain;Orthopedic restrictions;Decreased skin integrity   Assessment Pt demonstrated improved functional mobility this session, ambulating repeated household and community distances without physical assistance, and performing stair trial safely without incident  Trialed pt on RA during 1st ambulation trial & on steps with SpO2 beginning at 92% at rest, dropping to 83% during activity, returning to 92% with short seated rest   On 1 5L O2 nc pt was 97-98% at rest & 92% with activity  Pt demonstrated dyspnea & chest tightness with increased activity  No signs or symptoms of syncope this session  Educated pt on importance of improved mobility and exercise to improve endurance, activity tolerance, breathing & strength  Pt verbalized understanding at this time  Pt able to recall LE exercises from previous session, and performed them without incident  Will continue to benefit from therapy services to address noted deficits & maximize independence for safe return home with family support for mobility tasks once discharged from hospital    Barriers to Discharge None   Goals   Patient Goals to go home & not have trouble breathing   STG Expiration Date 10/27/18   Treatment Day 1   Plan   Treatment/Interventions Functional transfer training;LE strengthening/ROM; Elevations; Therapeutic exercise; Bed mobility;Gait training;Equipment eval/education; Endurance training;Patient/family training   Progress Progressing toward goals   PT Frequency (4-6x/week)   Recommendation   Recommendation Home PT; Home with family support   Equipment Recommended Cane  (given SPC during session, received BSC from )   PT - OK to Discharge Yes     Nabor Castellon, PTA

## 2018-10-18 NOTE — PROGRESS NOTES
Progress Note - Cardiothoracic Surgery   Jarred Solo 48 y o  female MRN: [id number]  Unit/Bed#: [facility] 428-01 Encounter: [id number]  Severe AS  S/P Transcatheter aortic valve replacement with a 23 mm Luo LEODAN 3 bioprosthetic valve via left transfemoral approach; POD # 2    24 Hour Events: No issues overnight  Patient anxious per RN staff  Seen by PT/OT, recommending discharge to home       Medications:   Scheduled Meds:  Current Facility-Administered Medications:  acetaminophen 650 mg Oral Q4H PRN Meg Spironello V, CRNP   amLODIPine 10 mg Oral Daily Ruben Friend PA-C   aspirin 81 mg Oral Daily Ruben Friend PA-C   atorvastatin 80 mg Oral Daily With BHAVIK Neumann   bisacodyl 10 mg Rectal Daily PRN Esa Lowry PA-C   citalopram 40 mg Oral Daily Ruben Friend PA-C   clopidogrel 75 mg Oral Daily Ruben Friend PA-C   docusate sodium 100 mg Oral BID Maxx Tipton PA-C   fentanyl citrate (PF) 50 mcg Intravenous Once Esa Lowry PA-C   furosemide 40 mg Oral Daily PAULETTE Leija   heparin (porcine) 5,000 Units Subcutaneous Q8H [facility] Ruben Friend PA-C   mupirocin 1 application Nasal V68G [facility] Ruben Friend PA-C   ondansetron 4 mg Intravenous Q6H PRN Ruben Friend PA-C   oxyCODONE 5 mg Oral Q4H PRN Meg Alexnellcori VENEGAS, PAULETTE   pantoprazole 40 mg Oral Early Morning Ruben Friend PA-C   polyethylene glycol 17 g Oral Daily Ruben Friend PA-C     Continuous Infusions:   PRN Meds:   acetaminophen    bisacodyl    ondansetron    oxyCODONE    Vitals:   Vitals:    10/18/18 0300 10/18/18 0600 10/18/18 0747 10/18/18 0832   BP: 112/56  122/56    BP Location: Right arm  Right arm    Pulse: 87  100 97   Resp: 18  18    Temp: 98 6 °F (37 °C)  98 7 °F (37 1 °C)    TempSrc: Oral  Oral    SpO2: 90%  (!) 88% 95%   Weight:  83 7 kg (184 lb 8 4 oz)     Height:           Telemetry: NSR; Heart Rate: 90s    Respiratory:   SpO2: SpO2: 95 %; 2 LPM    Intake/Output:     Intake/Output Summary (Last 24 hours) at 10/18/18 0840  Last data filed at 10/18/18 0636   Gross per 24 hour   Intake              450 ml   Output             1050 ml   Net             -600 ml         Weights:   Weight (last 2 days)     Date/Time   Weight    10/18/18 0600  83 7 (184 53)    10/17/18 0500  76 9 (169 53)    10/16/18 0847  82 1 (181)            Admit weight: 82 1    Results:     Results from last 7 days  Lab Units 10/18/18  0524 10/17/18  0434 10/16/18  1826 10/16/18  1117  10/11/18  0927   WBC Thousand/uL 13 44* 14 72*  --   --   --  10 51*   HEMOGLOBIN g/dL 10 0* 10 4* 11 4* 10 5*  --  13 6   I STAT HEMOGLOBIN   --   --   --   --   < >  --    HEMATOCRIT % 33 1* 33 6* 36 5 33 4*  < > 42 6   PLATELETS Thousands/uL 288 323  --  349  --  447*   < > = values in this interval not displayed  Results from last 7 days  Lab Units 10/18/18  0524 10/17/18  0434 10/17/18  0130  10/16/18  1117 10/16/18  1050   SODIUM mmol/L 136 136  --   --  137  --    POTASSIUM mmol/L 3 6 3 8 3 7  < > 3 1*  --    CHLORIDE mmol/L 100 103  --   --  103  --    CO2 mmol/L 30 29  --   --  27  --    BUN mg/dL 12 10  --   --  12  --    CREATININE mg/dL 0 69 0 70  --   --  0 75  --    GLUCOSE, ISTAT mg/dl  --   --   --   --   --  122   CALCIUM mg/dL 7 5* 7 1*  --   --  7 3*  --    < > = values in this interval not displayed  Results from last 7 days  Lab Units 10/11/18  0927   INR  1 04         Studies:  TTE: results pending    Invasive Lines/Tubes:  Invasive Devices     Central Venous Catheter Line            CVC Central Lines 10/16/18 Triple 1 day          Peripheral Intravenous Line            Peripheral IV 10/16/18 Left Wrist 1 day          Airway            ETT  Hi-Lo 7 mm 1 day                Physical Exam:    HEENT/NECK:  PERRLA  No jugular venous distention  Cardiac: Regular rate and rhythm  No rubs/murmurs/gallops    Pulmonary:  Breath sounds slightly diminished at the bases bilaterally  Abdomen:  Non-tender, Non-distended  Positive bowel sounds  Incisions: Pacer site CDI  Groins soft, NT  No hematoma  Dressings removed  Lower extremities: Extremities warm/dry  Radial/PT/DP pulses 2+ bilaterally  Trace edema B/L  Neuro: Alert and oriented X 3  Sensation is grossly intact  No focal deficits  Skin: Warm/Dry, without rashes or lesions  Assessment:  Patient Active Problem List   Diagnosis    [facility]congestive [facility] failure) ([facility] [address] )    Hypertension    Moderate to severe aortic stenosis    Aortic stenosis, severe    Coronary artery disease involving native coronary artery of native heart    HLD (hyperlipidemia)    History of Hodgkin's lymphoma    Anxiety    S/P pericardial window creation    Acute postoperative pulmonary insufficiency (HCC)    S/p TAVR (transcatheter aortic valve replacement), bioprosthetic    CAD (coronary artery disease)    Hyperlipidemia    [facility] ([facility])    Presence of permanent cardiac pacemaker       Severe AS  S/P Transcatheter aortic valve replacement with a 23 mm Luo LEODAN 3 bioprosthetic valve via left transfemoral approach; POD # 2      Plan:    1  Cardiac:   NSR; HR/BP well-controlled  Lopressor 12 5 mg PO BID  Continue ASA, plavix and Statin therapy  Central IV access no longer required; Remove central venous catheter today  Continue DVT prophylaxis    2  Pulmonary:   Acute post-op pulmonary insufficiency; Requiring 2 Liters via nasal cannula, secondary to poor inspiratory effort secondary to pain  Continue incentive spirometry/coughing/deep breathing exercises  Wean supplemental oxygen as tolerated for saturation > 90%      3  Renal:   Intake/Output net: -500 mL/24 hours  Continue diuresis   Lasix 40 mg IV QD  Potassium Chloride 20 mEq PO QD  Post op Creatinine stable; Follow up labs prn    4  Neuro:  Neurologically intact;  No active issues  Incisional pain well-controlled; Continue prn Percocet    5  GI:  Tolerating TLC 2 3 gm sodium diet  Maintain 1800 mL daily fluid restriction   Continue stool softeners and prn suppository  Continue GI prophylaxis    6  Endo:    No history of diabetes; Glucose well-controlled with sliding scale coverage    7  Hematology:   Post-operative acute blood loss anemia; Hemoglobin and hematocrit stable; trend prn    8  Disposition:  Anticipate discharge to home today, home PT/OT        VTE Pharmacologic Prophylaxis: Heparin  VTE Mechanical Prophylaxis: sequential compression device    Collaborative rounds completed with TOMI Martinez , and Renu Cabrera RN    SIGNATURE: [place] PA-C  DATE: October 18, 2018  TIME: 8:40 AM

## 2018-10-18 NOTE — PHYSICAL THERAPY NOTE
Physical Therapy Progress Note     10/18/18 8628   Pain Assessment   Pain Assessment 0-10   Pain Score 7   Pain Type Acute pain   Pain Location [facility]   Hospital Pain Intervention(s) Repositioned; Ambulation/increased activity; Distraction; Rest   Diversional Activities Television   Response to Interventions tolerated, slight decrease with ambulation   Restrictions/Precautions   Other Precautions Telemetry;O2;Fall Risk;Pain;Cardiac/sternal  (PPM precautions)   General   Family/Caregiver Present No   Subjective   Subjective Pt pleasant and agreeable to treatment  Stated her legs felt "tight" at beginning of session  Then complained of slight tightness in chest & soreness in neck with ambulation  Transfers   Sit to Stand 5  Supervision   Additional items Assist x 1; Armrests; Increased time required   Stand to Sit 5  Supervision   Additional items Assist x 1; Armrests; Increased time required   Toilet transfer 5  Supervision   Additional items Assist x 1; Increased time required   Ambulation/Elevation   Gait pattern Excessively slow; Short stride;Decreased foot clearance; Inconsistent bertin   Gait Assistance 5  Supervision   Additional items Assist x 1   Assistive Device [facility]   Distance 100' on RA, 150', 200', 150', 100', 150' all on 1 5 L O2 nc   Stair Management Assistance 5  Supervision   Additional items Assist x 1   Stair Management Technique One rail R;With cane; Foreward; Step to pattern   Number of Stairs 3   Balance   Static Sitting Fair +   Static Standing Fair   Ambulatory Fair -   Endurance Deficit   Endurance Deficit Yes   Endurance Deficit Description pt on 1 5L O2 nc during session, incresed dyspnea with ambulation   Activity Tolerance   Activity Tolerance Patient tolerated treatment well;Patient limited by fatigue   Nurse Екатерина Bell, RN   Exercises   Knee AROM Long Arc Quad Sitting;10 reps;AROM; Bilateral   Ankle Pumps Sitting;10 reps;AROM; Bilateral   Marching Sitting;10 reps;AROM; Bilateral   Assessment Prognosis Good   Problem List Decreased strength;Decreased endurance; Impaired balance;Decreased mobility;Pain;Orthopedic restrictions;Decreased skin integrity   Assessment Pt demonstrated improved functional mobility this session, ambulating repeated household and community distances without physical assistance, and performing stair trial safely without incident  Trialed pt on RA during 1st ambulation trial & on steps with SpO2 beginning at 92% at rest, dropping to 83% during activity, returning to 92% with short seated rest   On 1 5L O2 nc pt was 97-98% at rest & 92% with activity  Pt demonstrated dyspnea & chest tightness with increased activity  No signs or symptoms of syncope this session  Educated pt on importance of improved mobility and exercise to improve endurance, activity tolerance, breathing & strength  Pt verbalized understanding at this time  Pt able to recall LE exercises from previous session, and performed them without incident  Will continue to benefit from therapy services to address noted deficits & maximize independence for safe return home with family support for mobility tasks once discharged from hospital    Barriers to Discharge None   Goals   Patient Goals to go home & not have trouble breathing   STG Expiration Date 10/27/18   Treatment Day 1   Plan   Treatment/Interventions Functional transfer training;LE strengthening/ROM; Elevations; Therapeutic exercise; Bed mobility;Gait training;Equipment eval/education; Endurance training;Patient/family training   Progress Progressing toward goals   PT Frequency (4-6x/week)   Recommendation   Recommendation Home PT; Home with family support   Equipment Recommended Cane  (given SPC during session, received BSC from )   PT - OK to Discharge Yes     Any Johansen PTA

## 2018-10-18 NOTE — PLAN OF CARE
Problem: PHYSICAL THERAPY ADULT  Goal: Performs mobility at highest level of function for planned discharge setting  See evaluation for individualized goals  Treatment/Interventions: Functional transfer training, LE strengthening/ROM, Elevations, Therapeutic exercise, Endurance training, Equipment eval/education, Bed mobility, Gait training, Spoke to nursing, Spoke to case management  Equipment Recommended: Nelly Kline (at this time; will cont to monitor progress)       See flowsheet documentation for full assessment, interventions and recommendations  Outcome: Progressing  Prognosis: Good  Problem List: Decreased strength, Decreased endurance, Impaired balance, Decreased mobility, Pain, Orthopedic restrictions, Decreased skin integrity  Assessment: Pt demonstrated improved functional mobility this session, ambulating repeated household and community distances without physical assistance, and performing stair trial safely without incident  Trialed pt on RA during 1st ambulation trial & on steps with SpO2 beginning at 92% at rest, dropping to 83% during activity, returning to 92% with short seated rest   On 1 5L O2 nc pt was 97-98% at rest & 92% with activity  Pt demonstrated dyspnea & chest tightness with increased activity  No signs or symptoms of syncope this session  Educated pt on importance of improved mobility and exercise to improve endurance, activity tolerance, breathing & strength  Pt verbalized understanding at this time  Pt able to recall LE exercises from previous session, and performed them without incident  Will continue to benefit from therapy services to address noted deficits & maximize independence for safe return home with family support for mobility tasks once discharged from hospital   Barriers to Discharge: None     Recommendation: Home PT, Home with family support     PT - OK to Discharge: Yes    See flowsheet documentation for full assessment

## 2018-10-18 NOTE — PLAN OF CARE
Problem: OCCUPATIONAL THERAPY ADULT  Goal: Performs self-care activities at highest level of function for planned discharge setting  See evaluation for individualized goals  Treatment Interventions: ADL retraining, Functional transfer training, Endurance training, Patient/family training, Equipment evaluation/education, Compensatory technique education, Energy conservation, Activityengagement, Cardiac education  Equipment Recommended: (S) Bedside commode       See flowsheet documentation for full assessment, interventions and recommendations  Limitation: Decreased ADL status, Decreased endurance, Decreased self-care trans, Decreased high-level ADLs  Prognosis: Good  Assessment: Pt is a 48 y o  female who was [facility] to [facility] on 10/16/2018 with Aortic stenosis, severe  Pt seen in outpt office for cardiac surgery consultation and further cardiac w/u  Pt is now s/p TAVR on 10/16/18  S/p pt required unplanned PPM placement  Pt w/ active cardiac and PPM precautions  Pt's comorbidities include CHF, HTN, CAD, HLD, h/o Hodgkin's lymphoma, anxiety, and h/o CHB  At baseline pt was I w/ ADL, IADLs, and mobility  No DME use at baseline and (+)   Pt lives w/ spouse and 2 children in a 2 SH w/ pt's bedroom being upstairs  Pt reports her spouse works in [place], however her children can assist as needed Currently pt requires min A for overall ADLS and S-min A for functional mobility/transfers  Pt currently presents with impairments in the following categories -steps to enter environment, difficulty performing ADLS, difficulty performing IADLS,  activity tolerance, endurance and standing balance/tolerance  These impairments, as well as pt's fatigue, pain, cardiac/sternal precautions, decreased caregiver support and risk for falls  limit pt's ability to safely engage in all baseline areas of occupation, including grooming, bathing, dressing, toileting, functional mobility/transfers and leisure activities   Pt would benefit from cont treatment session(s) to review dressing modifications for PPM precautions and for carry over of all precautions during functional activity  Pt also would benefit from [facility] for home use  From OT standpoint, recommend home w/ family support and home OT pending progress upon D/C  OT will continue to follow to address the below stated goals        OT Discharge Recommendation: (S) Home OT  OT - OK to Discharge: Yes (when medically stable)

## 2018-10-18 NOTE — SOCIAL WORK
Pt is recommended to have in-home skilled nursing care, in-home PT, and in-home OT, all via [facility] services for her aftercare plan  CM met w/ pt to discuss recommendation  Pt is agreeable  SEDRICK provided pt w/ freedom of choice for [facility] providers from her home region of [facility]  [facility]  CM to follow

## 2018-10-18 NOTE — DISCHARGE INSTRUCTIONS
Please refer to post pacemaker implantation discharge instructions and restrictions and your pacemaker booklet/temporary card  Keep incision dry for one week  Do not use lotions/powders/creams on incision  Remove outer bandage 48 hours after implantation  Leave underlying steri-strips in place, they will either fall off on their own or will be removed at 2 week follow up appointment  No overhead reaching/pushing/pulling/lifting greater than 5-10lbs with left arm for one month  Please call the office if you notice redness, swelling, bleeding, or drainage from incision or if you develop fevers  AFTER PACEMAKER CARE:    If you have any questions, please call [phone] to speak with a nurse (8:30am-4pm, or [phone] after hours)  For appointments, please call [phone]  WHAT YOU SHOULD KNOW:   A pacemaker is a small, battery-powered device that is placed under your skin in your upper chest area with wires placed through a vein that lead directly into the heart  It helps regulate your heart rate and prevent your heart from beating too slowly                  AFTER YOU LEAVE:     Medicines:     · Pain medicine: You may need medicine to take away or decrease pain  ¨ Learn how to take your medicine  Ask what medicine and how much you should take  Be sure you know how, when, and how often to take it  Usually Over the counter pain medicine is sufficient to control pain (Acetominophen or Ibuprofen) Ask your doctor if you may take these  If this does not control your pain, narcotic pain killers may be prescribed, please call if you need prescription  ¨ Do not wait until the pain is severe before you take your medicine  Tell caregivers if your pain does not decrease  ¨ Pain medicine can make you dizzy or sleepy  Prevent falls by calling someone when you get out of bed or if you need help  Take your medicine as directed    Call your healthcare provider if you think your medicine is not helping or if you have side effects  Tell him if you are allergic to any medicine  Follow up with your cardiologist after your procedure: You will need a follow-up visit approximately 2 weeks after you leave the hospital  Your cardiologist will check your wound and make sure that your pacemaker is working correctly  Follow the instructions to check your pacemaker: Your cardiologist or primary healthcare provider will check your pacemaker and the battery regularly  He will use a computer to check your pacemaker over the telephone or wireless device which will be given to you  Pacemaker batteries usually last 5 to 10 years  The pacemaker unit will be replaced when the battery gets low  This is a simpler procedure than the original one to implant your pacemaker  Wound care:  Keep your incision dry for one week  Sponge/tub baths are preferred, try to avoid a shower for 7 days  Do not use lotions/powders/creams on incision  Remove outer bandage 48 hours after implantation  Leave underlying steri-strips in place, they will either fall off on their own or will be removed at 2 week follow up appointment  Please call the office if you notice redness, swelling, bleeding, or drainage from incision or if you develop fevers  Activity:   · Arm movement and lifting:  Be careful using the arm on the side of your pacemaker  Do not move your arm for the first 24 hours after your procedure  Do not  lift your arm above your shoulder or lift more than 10 pounds for one month after your procedure  Avoid pushing, pulling, or repetitive arm movements for one month  This helps the leads stay in place and helps your wound heal  Ask your caregiver when you can drive after your procedure  You may move your arm side to side without lifting above your shoulder, and do not need to wear a sling at home     · Typically driving is allowed after 1 week post pacemaker if you are stable, however in some cases it may be longer and you should discuss with your doctor before proceeding  · Sports:  Ask your caregiver when it is okay to play tennis, golf, basketball, or any sport that requires you to lift your arms  Do not play full contact sports, such as football, that could damage your pacemaker  Ask your cardiologist or primary healthcare provider how much and what kinds of physical activity are safe for you  Living with a pacemaker:   · Tell all caregivers you have a pacemaker: This includes surgeons, radiologists, and medical technicians  You may want to wear a medical alert ID bracelet or necklace that states that you have a pacemaker  · Carry your pacemaker ID card: Make sure you receive a pacemaker ID card  Carry it with you at all times  It lists important information about your pacemaker  Show it to airport security if you travel  · Avoid electrical interference:  Avoid welding equipment and other equipment with large magnets or electric fields  These things could interfere with how your pacemaker works  Use your cell phone on the ear opposite from your pacemaker  Do not carry your cell phone in your shirt pocket over your chest      · Some Pacemakers are MRI safe  Ask you doctor if it is safe to proceed with MRI and let the radiologist and staff know you have a pacemaker  · Do not touch the skin around your pacemaker: This can cause damage to the lead wires or move the pacemaker unit from where it should be  Contact your cardiologist or primary healthcare provider if:   · The area around your pacemaker has increasing amount of pain after surgery  The pain should improve over first few days after implantation  · The skin around your stitches has increasing redness, swelling, or has drainage  This may mean that you have an infection  · You have a fever  · You have chills, a cough, and feel weak or achy  These are also signs of infection  · Your feet or ankles are more swollen than your baseline  · Your Heart rate is less than 50 beats per minute     Seek care immediately if:   · Your bandage becomes soaked with blood  · Your pacemaker is swelling rapidly    · Your stitches open up  · You feel your heart suddenly beating very slowly or quickly  · You become too weak or dizzy to stand, or you pass out  · Your arm or leg feels warm, tender, and painful  It may look swollen and red  · You have chest pain that does not go away with rest or medicine  · You feel lightheaded, short of breath, and have chest pain  · You cough up blood  © 2014 [address] is for End User's use only and may not be sold, redistributed or otherwise used for commercial purposes  All illustrations and images included in CareNotes® are the copyrighted property of [employer]  or [employer]  The above information is an  only  It is not intended as medical advice for individual conditions or treatments  Talk to your doctor, nurse or pharmacist before following any medical regimen to see if it is safe and effective for you  Transcatheter Aortic Valve Replacement   WHAT YOU NEED TO KNOW:   · A TAVR is a procedure to replace your aortic valve  It is done without removing your old valve  The aortic valve is between the left ventricle and the aorta  The left ventricle is the lower left chamber of your heart  The aorta is a blood vessel that pumps blood to your brain and body  The aortic valve opens and closes to let blood flow from your heart to the rest of your body  · TAVR may be used to replace your aortic valve if open heart surgery is not safe for you  Your valve will be replaced with a tissue valve  The tissue may be taken from an animal, such as a pig or cow    DISCHARGE INSTRUCTIONS:   Call 911 for any of the following:   · You have any of the following signs of a heart attack:      ¨ Squeezing, pressure, or pain in your chest that lasts longer than 5 minutes or returns    ¨ Discomfort or pain in your back, neck, jaw, stomach, or arm     ¨ Trouble breathing    ¨ Nausea or vomiting    ¨ Lightheadedness or a sudden cold sweat, especially with chest pain or trouble breathing    · You have any of the following signs of a stroke:      ¨ Numbness or drooping on one side of your face     ¨ Weakness in an arm or leg    ¨ Confusion or difficulty speaking    ¨ Dizziness, a severe headache, or vision loss    · You feel lightheaded, short of breath, and have chest pain  · You cough up blood  · You have trouble breathing  Seek care immediately if:   · Blood soaks through your bandage  · Your stitches come apart  · Your wound gets swollen quickly  · Your heart is beating faster or slower than usual      · Your arm or leg feels numb, cool, or looks pale  · Your arm or leg feels warm, tender, and painful  It may look swollen and red  · You feel weak or dizzy  Contact your healthcare provider if:   · You have a fever or chills  · Your wound is red, swollen, or draining pus  · Your wound looks more bruised or there is new bruising near your wound  · You have nausea or are vomiting  · Your skin is itchy, swollen, or you have a rash  · You have questions or concerns about your condition or care  Medicines: You may need any of the following:  · Blood thinners    help prevent blood clots  Examples of blood thinners include heparin and warfarin  Clots can cause strokes, heart attacks, and death  The following are general safety guidelines to follow while you are taking a blood thinner:    ¨ Watch for bleeding and bruising while you take blood thinners  Watch for bleeding from your gums or nose  Watch for blood in your urine and bowel movements  Use a soft washcloth on your skin, and a soft toothbrush to brush your teeth  This can keep your skin and gums from bleeding  If you shave, use an electric shaver   Do not play contact sports  ¨ Tell your dentist and other healthcare providers that you take anticoagulants  Wear a bracelet or necklace that says you take this medicine  ¨ Do not start or stop any medicines unless your healthcare provider tells you to  Many medicines cannot be used with blood thinners  ¨ Tell your healthcare provider right away if you forget to take the medicine, or if you take too much  ¨ Warfarin  is a blood thinner that you may need to take  The following are things you should be aware of if you take warfarin  § Foods and medicines can affect the amount of warfarin in your blood  Do not make major changes to your diet while you take warfarin  Warfarin works best when you eat about the same amount of vitamin K every day  Vitamin K is found in green leafy vegetables and certain other foods  Ask for more information about what to eat when you are taking warfarin  § You will need to see your healthcare provider for follow-up visits when you are on warfarin  You will need regular blood tests  These tests are used to decide how much medicine you need  · Antiplatelets , such as aspirin, help prevent blood clots  Take your antiplatelet medicine exactly as directed  These medicines make it more likely for you to bleed or bruise  If you are told to take aspirin, do not take acetaminophen or ibuprofen instead  · Acetaminophen  helps decrease your pain  This medicine is available without a doctor's order  Ask how much medicine is safe to take, and how often to take it  Acetaminophen can cause liver damage if not taken correctly  · Take your medicine as directed  Contact your healthcare provider if you think your medicine is not helping or if you have side effects  Tell him or her if you are allergic to any medicine  Keep a list of the medicines, vitamins, and herbs you take  Include the amounts, and when and why you take them  Bring the list or the pill bottles to follow-up visits  Carry your medicine list with you in case of an emergency  Care for your wound as directed:  Ask your healthcare provider when your wound can get wet  Carefully wash around the wound with soap and water  Do not scrub your wound  You can let soap and water gently run over your wound  Gently pat dry the area and put on new, clean bandages as directed  Check your wound every day for signs of infection such as swelling, pus, or redness  Change your bandages when they get wet or dirty  Do not put lotions or powders on your wound  Do not take a bath or swim until your healthcare provider says it is okay  These activities can increase your risk for a wound infection  Activity:  Do not lift anything heavier than 5 pounds or do vigorous activities such as sports  These actions will put too much stress on your wound and heart  Take short walks around the house several times a day  This will help prevent blood clots  Ask your healthcare provider what other activities are safe for you to do  Also ask when you can return to your normal activities and work or school  Self-care:   · Eat heart healthy foods  You may need to eat foods that are low in salt, fat, or cholesterol  Healthy foods include fruits, vegetables, whole-grain breads, low-fat dairy products, beans, lean meats, and fish  Ask your healthcare provider for more information about a heart healthy diet  · Do not smoke  Nicotine and other chemicals in cigarettes and cigars can cause heart and lung damage  Nicotine can also slow healing  Ask your healthcare provider for information if you currently smoke and need help to quit  E-cigarettes or smokeless tobacco still contain nicotine  Talk to your healthcare provider before you use these products  · Do not drink alcohol  Ask your cardiologist if it is safe for you to drink alcohol  Alcohol can increase your risk for high blood pressure, diabetes, and coronary artery disease  · Maintain a healthy weight  Ask your healthcare provider how much you should weigh  Extra weight can increase the stress on your heart  Ask him to help you create a weight loss plan if you are overweight  · Exercise as directed after you recover  Your healthcare provider can help you create an exercise plan that is right for you  Exercise will help keep your heart healthy  Ask your healthcare provider if you need antibiotics before procedures:  Some procedures may allow bacteria to get into your blood and travel to your heart  This can cause an infection in your heart and prevent you from healing  You may need antibiotics before certain procedures that happen in the next 6 months to prevent infection  This may also include certain dental procedures  Ask your healthcare provider for more information  Follow up with your healthcare provider as directed: You may need to return for tests  These tests will make sure your valve is working correctly  Write down your questions so you remember to ask them during your visits  © 2017 [address] Information is for End User's use only and may not be sold, redistributed or otherwise used for commercial purposes  All illustrations and images included in CareNotes® are the copyrighted property of [employer]  or [employer]  The above information is an  only  It is not intended as medical advice for individual conditions or treatments  Talk to your doctor, nurse or pharmacist before following any medical regimen to see if it is safe and effective for you  Heart Healthy Diet   AMBULATORY CARE:   A heart healthy diet  is an eating plan low in total fat, unhealthy fats, and sodium (salt)  A heart healthy diet helps decrease your risk for heart disease and stroke  Limit the amount of fat you eat to 25% to 35% of your total daily calories  Limit sodium to less than 2,300 mg each day  Healthy fats:  Healthy fats can help improve cholesterol levels   The risk for heart disease is decreased when cholesterol levels are normal  Choose healthy fats, such as the following:  · Unsaturated fat  is found in foods such as soybean, canola, olive, corn, and safflower oils  It is also found in soft tub margarine that is made with liquid vegetable oil  · Omega-3 fat  is found in certain fish, such as salmon, tuna, and trout, and in walnuts and flaxseed  Unhealthy fats:  Unhealthy fats can cause unhealthy cholesterol levels in your blood and increase your risk of heart disease  Limit unhealthy fats, such as the following:  · Cholesterol  is found in animal foods, such as eggs and lobster, and in dairy products made from whole milk  Limit cholesterol to less than 300 milligrams (mg) each day  You may need to limit cholesterol to 200 mg each day if you have heart disease  · Saturated fat  is found in meats, such as esparza and hamburger  It is also found in chicken or turkey skin, whole milk, and butter  Limit saturated fat to less than 7% of your total daily calories  Limit saturated fat to less than 6% if you have heart disease or are at increased risk for it  · Trans fat  is found in packaged foods, such as potato chips and cookies  It is also in hard margarine, some fried foods, and shortening  Avoid trans fats as much as possible    Heart healthy foods and drinks to include:  Ask your dietitian or healthcare provider how many servings to have from each of the following food groups:  · Grains:      ¨ Whole-wheat breads, cereals, and pastas, and brown rice    ¨ Low-fat, low-sodium crackers and chips    · Vegetables:      ¨ Broccoli, green beans, green peas, and spinach    ¨ Collards, kale, and lima beans    ¨ Carrots, sweet potatoes, tomatoes, and peppers    ¨ Canned vegetables with no salt added    · Fruits:      ¨ Bananas, peaches, pears, and pineapple    ¨ Grapes, raisins, and dates    ¨ Oranges, tangerines, grapefruit, orange juice, and grapefruit juice    ¨ Apricots, mangoes, melons, and papaya    ¨ Raspberries and strawberries    ¨ Canned fruit with no added sugar    · Low-fat dairy products:      ¨ Nonfat (skim) milk, 1% milk, and low-fat almond, cashew, or soy milks fortified with calcium    ¨ Low-fat cheese, regular or frozen yogurt, and cottage cheese    · Meats and proteins , such as lean cuts of beef and pork (loin, leg, round), skinless chicken and turkey, legumes, soy products, egg whites, and nuts  Foods and drinks to limit or avoid:  Ask your dietitian or healthcare provider about these and other foods that are high in unhealthy fat, sodium, and sugar:  · Snack or packaged foods , such as frozen dinners, cookies, macaroni and cheese, and cereals with more than 300 mg of sodium per serving    · Canned or dry mixes  for cakes, soups, sauces, or gravies    · Vegetables with added sodium , such as instant potatoes, vegetables with added sauces, or regular canned vegetables    · Other foods high in sodium , such as ketchup, barbecue sauce, salad dressing, pickles, olives, soy sauce, and miso    · High-fat dairy foods  such as whole or 2% milk, cream cheese, or sour cream, and cheeses     · High-fat protein foods  such as high-fat cuts of beef (T-bone steaks, ribs), chicken or turkey with skin, and organ meats, such as liver    · Cured or smoked meats , such as hot dogs, esparza, and sausage    · Unhealthy fats and oils , such as butter, stick margarine, shortening, and cooking oils such as coconut or palm oil    · Food and drinks high in sugar , such as soft drinks (soda), sports drinks, sweetened tea, candy, cake, cookies, pies, and doughnuts  Other diet guidelines to follow:   · Eat more foods containing omega-3 fats  Eat fish high in omega-3 fats at least 2 times a week  · Limit alcohol  Too much alcohol can damage your heart and raise your blood pressure  Women should limit alcohol to 1 drink a day  Men should limit alcohol to 2 drinks a day   A drink of alcohol is 12 ounces of beer, 5 ounces of wine, or 1½ ounces of liquor  · Choose low-sodium foods  High-sodium foods can lead to high blood pressure  Add little or no salt to food you prepare  Use herbs and spices in place of salt  · Eat more fiber  to help lower cholesterol levels  Eat at least 5 servings of fruits and vegetables each day  Eat 3 ounces of whole-grain foods each day  Legumes (beans) are also a good source of fiber  Lifestyle guidelines:   · Do not smoke  Nicotine and other chemicals in cigarettes and cigars can cause lung and heart damage  Ask your healthcare provider for information if you currently smoke and need help to quit  E-cigarettes or smokeless tobacco still contain nicotine  Talk to your healthcare provider before you use these products  · Exercise regularly  to help you maintain a healthy weight and improve your blood pressure and cholesterol levels  Ask your healthcare provider about the best exercise plan for you  Do not start an exercise program without asking your healthcare provider  Follow up with your healthcare provider as directed:  Write down your questions so you remember to ask them during your visits  © 2017 [address] [facility] Information is for End User's use only and may not be sold, redistributed or otherwise used for commercial purposes  All illustrations and images included in CareNotes® are the copyrighted property of [employer]  or [employer]  The above information is an  only  It is not intended as medical advice for individual conditions or treatments  Talk to your doctor, nurse or pharmacist before following any medical regimen to see if it is safe and effective for you

## 2018-10-18 NOTE — PROCEDURES
Procedure note for emergent transvenous pacemaker insertion    Patient had episode of bradycardia leading to asystole requiring CPR with return of spontaneous circulation status post TAVR  Patient's heart rate remained at 30 with hypotension  Patient did receive atropine  Patient arrived with right internal jugular introducer sheath in place from operative room  Introducer sheath was prepped in a sterile fashion with chlorhexidine and draped sterile for procedure  Temporary single venous lead introduced through introducer port and advanced to approximately 15 cm  Balloon was inflated and pacer wire advanced until capture achieved  MA increased from 10-15 to achieve capture  Transvenous wire with appropriate pacing secured at 48 cm  Patient to be transferred emergently to EP Cardiology for permanent pacemaker placement  Fluoroscopy will be performed in EP lab to evaluate placement of transvenous wire      Patient pacing at approximately 80 beats per minute with improved hemodynamic status maintaining mean arterial pressure greater than 65    Procedure does not include critical care time    Date of procedure 10/16/2018

## 2018-10-18 NOTE — RESPIRATORY THERAPY NOTE
RT Protocol Note  Conception Abel 48 y o  female MRN: [id number]  Unit/Bed#: [facility] 428-01 Encounter: [id number]    Assessment    Principal Problem: Aortic stenosis, severe  Active Problems:    CHF (congestive heart failure) (HCC)    Hypertension    Coronary artery disease involving native coronary artery of native heart    HLD (hyperlipidemia)    History of Hodgkin's lymphoma    Anxiety    S/P pericardial window creation    Acute postoperative pulmonary insufficiency (HCC)    S/p TAVR (transcatheter aortic valve replacement), bioprosthetic    CAD (coronary artery disease)    Hyperlipidemia    CHB (complete heart block) (HCC)    Presence of permanent cardiac pacemaker      Home Pulmonary Medications:         Past Medical History:   Diagnosis Date    Anxiety     CAD (coronary artery disease)     CHF (congestive heart failure) (HCC)     Colon tumor     s/p resection    Hodgkin's disease ([facility] [address] )     age 7, s/p radiation & chemotherapy    Hyperlipidemia     Hypertension     LBBB (left bundle branch block)     Severe aortic stenosis      Social History     Social History    Marital status: /Civil Union     Spouse name: N/A    Number of children: N/A    Years of education: N/A     Social History Main Topics    Smoking status: Never Smoker    Smokeless tobacco: Never Used    Alcohol use No    Drug use: No    Sexual activity: No     Other Topics Concern    None     Social History Narrative    None       Subjective         Objective    Physical Exam:   Assessment Type: Assess only  General Appearance: Alert, Awake  Respiratory Pattern: Normal  Chest Assessment: Chest expansion symmetrical    Vitals:  Blood pressure 122/56, pulse 97, temperature 98 7 °F (37 1 °C), temperature source Oral, resp  rate 18, height 5' 5" (1 651 m), weight 83 7 kg (184 lb 8 4 oz), SpO2 95 %        Results from last 7 days  Lab Units 10/16/18  2235   PH ART  7 432   PCO2 ART mm Hg 42 5   PO2 ART mm Hg 92 7   HCO3 ART mmol/L 27 7   BASE EXC ART mmol/L 3 1   O2 CONTENT ART mL/dL 16 5   O2 HGB, ARTERIAL % 96 4   ABG SOURCE  Line, Arterial       Imaging and other studies: I have personally reviewed pertinent reports  O2 Device: vent     Plan    Respiratory Plan: No distress/Pulmonary history, Discontinue Protocol  Airway Clearance Plan: (P) Discontinue Protocol     Resp Comments: Pt does well with IS  No resp distress noted  Pt understands the use and frequency of IS  Pt exceeds 10ml/Kg on IS  ACP protocol will be D/C at this time and pt will continue with IS independently

## 2018-10-19 ENCOUNTER — APPOINTMENT (INPATIENT)
Dept: RADIOLOGY | Facility: HOSPITAL | Age: 53
DRG: 175 | End: 2018-10-19
Payer: COMMERCIAL

## 2018-10-19 VITALS
HEIGHT: 65 IN | HEART RATE: 81 BPM | BODY MASS INDEX: 30.89 KG/M2 | SYSTOLIC BLOOD PRESSURE: 112 MMHG | RESPIRATION RATE: 18 BRPM | OXYGEN SATURATION: 93 % | TEMPERATURE: 99.3 F | WEIGHT: 185.41 LBS | DIASTOLIC BLOOD PRESSURE: 53 MMHG

## 2018-10-19 PROBLEM — J95.2 ACUTE POSTOPERATIVE PULMONARY INSUFFICIENCY (HCC): Status: RESOLVED | Noted: 2018-10-16 | Resolved: 2018-10-19

## 2018-10-19 LAB
ANION GAP SERPL CALCULATED.3IONS-SCNC: 5 MMOL/L (ref 4–13)
BUN SERPL-MCNC: 17 MG/DL (ref 5–25)
CALCIUM SERPL-MCNC: 8 MG/DL (ref 8.3–10.1)
CHLORIDE SERPL-SCNC: 99 MMOL/L (ref 100–108)
CO2 SERPL-SCNC: 31 MMOL/L (ref 21–32)
CREAT SERPL-MCNC: 0.75 MG/DL (ref 0.6–1.3)
ERYTHROCYTE [DISTWIDTH] IN BLOOD BY AUTOMATED COUNT: 15.6 % (ref 11.6–15.1)
GFR SERPL CREATININE-BSD FRML MDRD: 93 ML/MIN/1.73SQ M
GLUCOSE SERPL-MCNC: 104 MG/DL (ref 65–140)
GLUCOSE SERPL-MCNC: 108 MG/DL (ref 65–140)
GLUCOSE SERPL-MCNC: 97 MG/DL (ref 65–140)
GLUCOSE SERPL-MCNC: 99 MG/DL (ref 65–140)
HCT VFR BLD AUTO: 31.6 % (ref 34.8–46.1)
HGB BLD-MCNC: 9.6 G/DL (ref 11.5–15.4)
MCH RBC QN AUTO: 27.5 PG (ref 26.8–34.3)
MCHC RBC AUTO-ENTMCNC: 30.4 G/DL (ref 31.4–37.4)
MCV RBC AUTO: 91 FL (ref 82–98)
PLATELET # BLD AUTO: 299 THOUSANDS/UL (ref 149–390)
PMV BLD AUTO: 10.8 FL (ref 8.9–12.7)
POTASSIUM SERPL-SCNC: 4.1 MMOL/L (ref 3.5–5.3)
RBC # BLD AUTO: 3.49 MILLION/UL (ref 3.81–5.12)
SODIUM SERPL-SCNC: 135 MMOL/L (ref 136–145)
WBC # BLD AUTO: 13.13 THOUSAND/UL (ref 4.31–10.16)

## 2018-10-19 PROCEDURE — 97530 THERAPEUTIC ACTIVITIES: CPT | Performed by: PHYSICAL THERAPIST

## 2018-10-19 PROCEDURE — 97116 GAIT TRAINING THERAPY: CPT | Performed by: PHYSICAL THERAPIST

## 2018-10-19 PROCEDURE — 85027 COMPLETE CBC AUTOMATED: CPT | Performed by: PHYSICIAN ASSISTANT

## 2018-10-19 PROCEDURE — 82948 REAGENT STRIP/BLOOD GLUCOSE: CPT

## 2018-10-19 PROCEDURE — 71275 CT ANGIOGRAPHY CHEST: CPT

## 2018-10-19 PROCEDURE — 99238 HOSP IP/OBS DSCHRG MGMT 30/<: CPT | Performed by: THORACIC SURGERY (CARDIOTHORACIC VASCULAR SURGERY)

## 2018-10-19 PROCEDURE — 80048 BASIC METABOLIC PNL TOTAL CA: CPT | Performed by: THORACIC SURGERY (CARDIOTHORACIC VASCULAR SURGERY)

## 2018-10-19 PROCEDURE — 99254 IP/OBS CNSLTJ NEW/EST MOD 60: CPT | Performed by: INTERNAL MEDICINE

## 2018-10-19 RX ORDER — DOCUSATE SODIUM 100 MG/1
100 CAPSULE, LIQUID FILLED ORAL 2 TIMES DAILY PRN
Qty: 10 CAPSULE | Refills: 0 | COMMUNITY
Start: 2018-10-19 | End: 2018-11-15

## 2018-10-19 RX ORDER — CLOPIDOGREL BISULFATE 75 MG/1
75 TABLET ORAL DAILY
Qty: 30 TABLET | Refills: 2 | Status: SHIPPED | OUTPATIENT
Start: 2018-10-20 | End: 2018-11-02 | Stop reason: SDUPTHER

## 2018-10-19 RX ORDER — POTASSIUM CHLORIDE 20 MEQ/1
20 TABLET, EXTENDED RELEASE ORAL DAILY
Qty: 30 TABLET | Refills: 0 | Status: SHIPPED | OUTPATIENT
Start: 2018-10-19 | End: 2021-01-18

## 2018-10-19 RX ORDER — POTASSIUM CHLORIDE 20 MEQ/1
20 TABLET, EXTENDED RELEASE ORAL DAILY
Status: DISCONTINUED | OUTPATIENT
Start: 2018-10-19 | End: 2018-10-19 | Stop reason: HOSPADM

## 2018-10-19 RX ORDER — ACETAMINOPHEN 325 MG/1
TABLET ORAL
Qty: 30 TABLET | Refills: 0
Start: 2018-10-19

## 2018-10-19 RX ADMIN — OXYCODONE HYDROCHLORIDE 5 MG: 5 TABLET ORAL at 11:43

## 2018-10-19 RX ADMIN — HEPARIN SODIUM 5000 UNITS: 5000 INJECTION, SOLUTION INTRAVENOUS; SUBCUTANEOUS at 14:17

## 2018-10-19 RX ADMIN — IOHEXOL 57 ML: 350 INJECTION, SOLUTION INTRAVENOUS at 13:37

## 2018-10-19 RX ADMIN — CLOPIDOGREL BISULFATE 75 MG: 75 TABLET ORAL at 08:00

## 2018-10-19 RX ADMIN — FUROSEMIDE 40 MG: 40 TABLET ORAL at 08:00

## 2018-10-19 RX ADMIN — HEPARIN SODIUM 5000 UNITS: 5000 INJECTION, SOLUTION INTRAVENOUS; SUBCUTANEOUS at 05:21

## 2018-10-19 RX ADMIN — ATORVASTATIN CALCIUM 80 MG: 80 TABLET, FILM COATED ORAL at 16:33

## 2018-10-19 RX ADMIN — AMLODIPINE BESYLATE 10 MG: 10 TABLET ORAL at 08:00

## 2018-10-19 RX ADMIN — CITALOPRAM HYDROBROMIDE 40 MG: 20 TABLET ORAL at 08:00

## 2018-10-19 RX ADMIN — MUPIROCIN 1 APPLICATION: 20 OINTMENT TOPICAL at 08:00

## 2018-10-19 RX ADMIN — PANTOPRAZOLE SODIUM 40 MG: 40 TABLET, DELAYED RELEASE ORAL at 05:21

## 2018-10-19 RX ADMIN — Medication 81 MG: at 08:00

## 2018-10-19 RX ADMIN — METOPROLOL TARTRATE 12.5 MG: 25 TABLET ORAL at 08:00

## 2018-10-19 RX ADMIN — POTASSIUM CHLORIDE 20 MEQ: 1500 TABLET, EXTENDED RELEASE ORAL at 16:33

## 2018-10-19 NOTE — SOCIAL WORK
Pt is cleared for d/c by Cardiothoracic Surgery  RN Ginny Mccollum was notified of d/c order  Pt is accepted for services by [facility] for her aftercare plan  Pt will also receive a cane, bedside commode, and Home O2, all from [facility] prior to [place]/c  The pt and her  Deborrah Better were both informed of d/c   will transport pt home later this day, pickup time TBD  No IMM required  No chart copy required  CM to follow

## 2018-10-19 NOTE — PLAN OF CARE
Problem: PHYSICAL THERAPY ADULT  Goal: Performs mobility at highest level of function for planned discharge setting  See evaluation for individualized goals  Treatment/Interventions: Functional transfer training, LE strengthening/ROM, Elevations, Therapeutic exercise, Endurance training, Equipment eval/education, Bed mobility, Gait training, Spoke to nursing, Spoke to case management  Equipment Recommended: Keyon Navarro (at this time; will cont to monitor progress)       See flowsheet documentation for full assessment, interventions and recommendations  Outcome: Progressing  Prognosis: Good  Problem List: Decreased strength, Decreased endurance, Impaired balance, Decreased mobility, Pain, Orthopedic restrictions  Assessment: Pt demonstrates improved breathing and O2 saturation with cues and training diapragmatic breathing into hands  She is mod I w sit-stand trasnfer and bed mobiltiy  She began ambulating w O2% around 86% and drops to 82% after 80'  With standing rest and cues for breathing pt O2 returns to 93% and patient able to navigate 7 stairs and then ambulate anothe 100'  Pt then drops again and is cued for breathing into hands and O2 returns to 97% and is able to ambulate 150ft with O2 in 90's  Pt seated in reclining chair post session w O2 at 96% and SCD's placed  Barriers to Discharge: None     Recommendation:  (Home PT vs Home w family support)     PT - OK to Discharge: Yes    See flowsheet documentation for full assessment

## 2018-10-19 NOTE — PHYSICAL THERAPY NOTE
PHYSICAL THERAPY NOTE    Patient Name: Sarthak Hinojosa  ZJIBP'H Date: 10/19/2018     10/19/18 5522   Pain Assessment   Pain Assessment No/denies pain   Pain Score No Pain  (at rest, but patient report some L shoulder pain w activity)   Pain Type Acute pain   Pain Location Shoulder   Pain Orientation Left; Anterior   Hospital Pain Intervention(s) Ambulation/increased activity   Response to Interventions Tolerated well, w some L shoulder pain, but not limiting ambulation   Restrictions/Precautions   Weight Bearing Precautions Per Order No   Other Precautions O2;Cardiac/sternal;Fall Risk;Pain   General   Chart Reviewed Yes   Family/Caregiver Present No   Cognition   Orientation Level Oriented X4   Subjective   Subjective Pt reports no pain at rest and is agreeable to ambulate  Bed Mobility   Rolling R 6  Modified independent   Additional items Bedrails; Increased time required   Supine to Sit 6  Modified independent   Additional items Bedrails; Increased time required   Transfers   Sit to Stand 6  Modified independent   Additional items Bedrails; Increased time required   Stand to Sit 6  Modified independent   Additional items Armrests; Increased time required   Toilet transfer 6  Modified independent   Additional items Increased time required;Raised toilet seat   Additional Comments Pt seated in reclining chair w SCD's placed post treatment  Ambulation/Elevation   Gait pattern Excessively slow; Short stride;Decreased foot clearance   Gait Assistance 5  Supervision   Additional items Assist x 1   Assistive Device Straight cane   Distance 80'+100'+150' on 0 5 L O2   Stair Management Assistance 5  Supervision   Additional items Verbal cues; Increased time required   Stair Management Technique One rail R;Step to pattern; With cane   Number of Stairs 7   Balance   Static Sitting Good   Dynamic Sitting Fair +   Static Standing Fair   Dynamic Standing Fair   Ambulatory Fair -   Endurance Deficit   Endurance Deficit Yes   Endurance Deficit Description Pt on 0 5 L of O2 w ambulation and required frequent standing rest breaks w emphasize on deep breathing to return O2% to greater than 85%  Pt drops to low 80's w ambulation  Pt has marked improvement w practice of diaphragmatic breathing w hands on stomach for breathing and acheives 97% O2 saturation standing at rest and seated post session  Activity Tolerance   Activity Tolerance Patient limited by fatigue;Treatment limited secondary to medical complications (Comment)   Nurse Made Aware Okay per Nettie العلي RN   Exercises   Neuro re-ed Diaphragmatic breathing technique  (5 minutes)   Assessment   Prognosis Good   Problem List Decreased strength;Decreased endurance; Impaired balance;Decreased mobility;Pain;Orthopedic restrictions   Assessment Pt demonstrates improved breathing and O2 saturation with cues and training diapragmatic breathing into hands  She is mod I w sit-stand trasnfer and bed mobiltiy  She began ambulating w O2% around 86% and drops to 82% after 80'  With standing rest and cues for breathing pt O2 returns to 93% and patient able to navigate 7 stairs and then ambulate anothe 100'  Pt then drops again and is cued for breathing into hands and O2 returns to 97% and is able to ambulate 150ft with O2 in 90's  Pt seated in reclining chair post session w O2 at 96% and SCD's placed  Goals   Patient Goals Breath better with walking  Treatment Day 2   Plan   Treatment/Interventions Functional transfer training;LE strengthening/ROM; Elevations; Therapeutic exercise; Endurance training;Gait training;Bed mobility; Patient/family training;Spoke to nursing   Progress Progressing toward goals   PT Frequency (4-6x/wk)   Recommendation   Recommendation (Home PT vs Home w family support)   PT - OK to Discharge Yes     Dakotah Lloyd, PT

## 2018-10-19 NOTE — RESTORATIVE TECHNICIAN NOTE
Restorative Specialist Mobility Note       Activity: Ambulate in gongora, Chair     [facility]

## 2018-10-19 NOTE — DISCHARGE SUMMARY
Discharge Summary - Cardiothoracic Surgery   Ashwin Harris 48 y o  female MRN: [id number]  Unit/Bed#: [facility] 428-01 Encounter: [id number]    Admission Date: 10/16/2018     Discharge Date: 10/19/18    Admitting Diagnosis: Aortic stenosis, severe [I35 0]    Primary Discharge Diagnosis:   Aortic stenosis, Non-Rheumatic  S/P transfemoral transcatheter aortic valve replacement;    Secondary Discharge Diagnosis:   CAD, CHF, HTN, HLD, calcified aorta and PA, hx of Hodgkins lymphoma (age 7 w/ radiation & chemo), anxiety, LBBB    Attending: TOMI Abad  Consulting Physician(s):   Cardiology  Medical/Critical Care  EP    Procedures Performed:   10/16/2018: Transcatheter aortic valve replacement with a 23 mm Luo EVELIA 3 bioprosthetic valve via left transfemoral approach  10/16/2018: Dual Chamber Permanent Pacemaker Implantation    Hospital Course:   10/16: Elective admission  TF TAVR 23 mm Evelia 3  Patient tolerated Procedure well and was transferred to the ICU hemodynamically supported on Cardene at 5  No products were given intraoperatively  No PVL was noted on echo post-valve deployment  LBBB developed immediately after valve deployment  EKG NSR  Right groin dressing saturated upon arrival to ICU  Pressure held and redressed w/ resolution of bleeding  Wean to extubate  PM: Developed CHB, temporary pacing wire floated, EP consulted, for PPM tonight  10/17: No further heart block overnight  Extubated without incident  Lopressor 12 5 BID  Arterial line removed  Lasix 40 mg PO QD (home dose)  Transfer from ICU to telemetry  10/18: Patient desaturating to 70s with ambulation  CXR w/o significant findings  Otherwise asymptomatic  Saturating well on RA in PM   10/19:  CTA ordered by pulmonary negative for PE  Cleared for discharge to home with home oxygen at 2L, pulmonary ordered oxygen for home  Lasix and kdur continued for home medications  Home care with Ja[facility]       Condition at Discharge: good     Discharge Physical Exam:  HEENT/NECK:  PERRLA  No jugular venous distention  Cardiac: Regular rate and rhythm  No rubs/murmurs/gallops  Pulmonary:  Breath sounds slightly diminished at the bases bilaterally  Abdomen:  Non-tender, Non-distended  Positive bowel sounds  Incisions: bilateral groins are c/d/i  Lower extremities: Extremities warm/dry  Radial/PT/DP pulses 2+ bilaterally  No edema B/L  Neuro: Alert and oriented X 3  Sensation is grossly intact  No focal deficits  Skin: Warm/Dry, without rashes or lesions  Discharge Data:    Results from last 7 days  Lab Units 10/19/18  0522 10/18/18  0524 10/17/18  0434   WBC Thousand/uL 13 13* 13 44* 14 72*   HEMOGLOBIN g/dL 9 6* 10 0* 10 4*   HEMATOCRIT % 31 6* 33 1* 33 6*   PLATELETS Thousands/uL 299 288 323       Results from last 7 days  Lab Units 10/19/18  0522 10/18/18  0524 10/17/18  0434  10/16/18  1050   SODIUM mmol/L 135* 136 136  < >  --    POTASSIUM mmol/L 4 1 3 6 3 8  < >  --    CHLORIDE mmol/L 99* 100 103  < >  --    CO2 mmol/L 31 30 29  < >  --    BUN mg/dL 17 12 10  < >  --    CREATININE mg/dL 0 75 0 69 0 70  < >  --    GLUCOSE, ISTAT mg/dl  --   --   --   --  122   CALCIUM mg/dL 8 0* 7 5* 7 1*  < >  --    < > = values in this interval not displayed  Discharge instructions/Information to patient and family:   See after visit summary for information provided to patient and family  Johny Patel was educated on restrictions regarding driving and lifting, and techniques of proper incisional care  They were specifically counselled on signs and symptoms of a sternal wound infection, and advised to contact our service immediately should they develop fevers, sweats, chill, redness or drainage at the site of any incisions  Provisions for Follow-Up Care:  See after visit summary for information related to follow-up care and any pertinent home health orders        Disposition:  Home    Planned Readmission: No    Discharge Medications:  See after visit summary for reconciled discharge medications provided to patient and family  Johny Amanda was provided contact information and scheduled a follow up appointment with TOMI Marcial  Additionally, they were advised to schedule follow up appointments to be seen by their primary care physician within 7-10 days, and their cardiologist within 2-3 weeks  Contact information was provided  Johny Patel was counseled on the importance of avoiding tobacco products  As with all patients whom have undergone open heart surgery, tobacco cessation medication was contraindicated at the time of discharge  The patient was discharged on her preoperative lasix and added supplementation potassium  Discharged to home on ASA, Plavix and beta blocker therapy  The patient was informed that following their postoperative surgical evaluation, they will be referred to outpatient cardiac rehabilitation  They were counseled that this program is run by specialists who will help them safely strengthen their heart and prevent more heart disease  Cardiac rehabilitation will include exercise, relaxation, stress management, and heart-healthy nutrition  Caregivers will also check to make sure their medication regimen is working  I spent 30 minutes discharging the patient  This time was spent on the day of discharge  I had direct contact with the patient on the day of discharge  Additional documentation is required if more than 30 minutes were spent on discharge       SIGNATURE: Rodriguez Chung  DATE: October 19, 2018  TIME: 2:57 PM

## 2018-10-19 NOTE — CONSULTS
Consultation - Pulmonary Medicine   Jodi Peck 48 y o  female MRN: [id number]  Unit/Bed#: [facility] 428-01 Encounter: [id number]      Assessment:  Acute hypoxic respiratory failure  S/P TAVR for severe aortic stenosis  Pulmonary hypertension with chronic diastolic dysfunction    Plan:     Patient has significant hypoxia with pulse oximetry readings of 86% at rest and 83% with exertion both on room air  She improved to 97% with ambulation on 2liters of nasal cannula  I recommend discharging on 2 liters 24/7 and reevaluate in the office in 1-2 weeks with 6 minute walk test       I would check a CTA to rule out acute thromboembolic disease given her history of DVT and postoperative state  If this is negative she would likely benefit from gentle diuresis upon discharge  History of Present Illness   Physician Requesting Consult: Tita Gomez MD  Reason for Consult / Principal Problem: hypoxia   HPI: Jodi Peck is a 48y o  year old female who presents with severe aortic stenosis for a scheduled TAVR procedure  Patient had the procedure on 10/16 and continues to have hypoxia  She denies any shortness of breath currently  She do3es admit to waxing and waning shortness of breath with exertion over the past year or so  She denies any exertional dyspnea walking the halls but has been hypoxic  She denies any chest pain, cough mucous production or leg swelling  She is a lifelong non-smoker with no history of asthma, bronchitis or pneumonia  She did have lower extremity DVT years ago which was treated with 6 months of systemic anticoagulation  Inpatient consult to Pulmonology  Consult performed by: Sonido Zuniga ordered by: Lori Castillo          Review of Systems   All other systems reviewed and are negative        Historical Information   Past Medical History:   Diagnosis Date    Anxiety     CAD (coronary artery disease)     CHF (congestive heart failure) (HCC)     Colon tumor     s/p resection    Hodgkin's disease [facility])     age 7, s/p radiation & chemotherapy    Hyperlipidemia     Hypertension     LBBB (left bundle branch block)     Severe aortic stenosis      Past Surgical History:   Procedure Laterality Date    CARDIAC CATHETERIZATION      COLON SURGERY      non cancerous tumor    NECK SURGERY      PERICARDIAL WINDOW      NJ ECHO TRANSESOPHAG R-T 2D W/PRB IMG ACQUISJ I&R N/A 10/16/2018    Procedure: TRANSESOPHAGEAL ECHOCARDIOGRAM (MICHAEL); Surgeon: Marylen Glad, MD;  Location: [facility];  Service: Cardiac Surgery    NJ REPLACE AORTIC VALVE OPENFEMORAL ARTERY APPROACH N/A 10/16/2018    Procedure: REPLACEMENT AORTIC VALVE TRANSCATHETER (TAVR) TRANSFEMORAL W/ 23 MM DE LA ROSA LEODAN S3 VALVE (ACCESS ON LEFT); Surgeon: Marylen Glad, MD;  Location: [facility];  Service: Cardiac Surgery    REPLACEMENT AORTIC VALVE TRANSCATHETER (TAVR)      TOTAL ABDOMINAL HYSTERECTOMY      TUMOR REMOVAL       Social History   History   Alcohol Use No     History   Drug Use No     History   Smoking Status    Never Smoker   Smokeless Tobacco    Never Used     Occupational History: none    Family History: non-contributory    Meds/Allergies   all current active meds have been reviewed    No Known Allergies    Objective   Vitals: Blood pressure 115/57, pulse 77, temperature 99 3 °F (37 4 °C), temperature source Oral, resp  rate 18, height 5' 5" (1 651 m), weight 84 1 kg (185 lb 6 5 oz), SpO2 92 %  ,Body mass index is 30 85 kg/m²      Intake/Output Summary (Last 24 hours) at 10/19/18 1340  Last data filed at 10/19/18 1207   Gross per 24 hour   Intake              925 ml   Output             1625 ml   Net             -700 ml     Invasive Devices     Central Venous Catheter Line            CVC Central Lines 10/16/18 Triple Right Internal jugular 3 days          Peripheral Intravenous Line            Peripheral IV 10/16/18 Left Wrist 3 days                Physical Exam   Constitutional: She is oriented to person, place, and time  She appears well-developed and well-nourished  HENT:   Head: Normocephalic  Eyes: Pupils are equal, round, and reactive to light  Neck: Normal range of motion  Neck supple  Cardiovascular: Normal rate and regular rhythm  Pulmonary/Chest: Effort normal and breath sounds normal  She has no wheezes  She has no rales  Abdominal: Soft  Musculoskeletal: Normal range of motion  She exhibits no edema  Neurological: She is alert and oriented to person, place, and time  Skin: Skin is warm and dry  Lab Results:   ABG: No results found for: PHART, PHV1YPZ, PO2ART, ZHG2CYK, F1XRBQTD, BEART, SOURCE, CBC:   Lab Results   Component Value Date    WBC 13 13 (H) 10/19/2018    HGB 9 6 (L) 10/19/2018    HCT 31 6 (L) 10/19/2018    MCV 91 10/19/2018     10/19/2018    MCH 27 5 10/19/2018    MCHC 30 4 (L) 10/19/2018    RDW 15 6 (H) 10/19/2018    MPV 10 8 10/19/2018   , CMP:   Lab Results   Component Value Date     (L) 10/19/2018    K 4 1 10/19/2018    CL 99 (L) 10/19/2018    CO2 31 10/19/2018    BUN 17 10/19/2018    CREATININE 0 75 10/19/2018    CALCIUM 8 0 (L) 10/19/2018    EGFR 93 10/19/2018     Imaging Studies: I have personally reviewed pertinent reports      EKG, Pathology, and Other Studies: I have personally reviewed pertinent films in PACS  VTE Prophylaxis: Heparin    Code Status: Level 1 - Full Code  Advance Directive and Living Will:      Power of :    POLST:      None

## 2018-10-19 NOTE — PROGRESS NOTES
Progress Note - Cardiothoracic Surgery   Ashwin Harris 48 y o  female MRN: [id number]  Unit/Bed#: [facility] 428-01 Encounter: [id number]  Severe AS  S/P Transcatheter aortic valve replacement with a 23 mm Luo LEODAN 3 bioprosthetic valve via left transfemoral approach; POD # 3    24 Hour Events: Discharge cancelled yesterday due to hypoxia with ambulation  Patient desat's to 70's with walking  CXR without acute pathology  Remains on 2L NC overnight  Desat's to mid [de-identified] on RA       Medications:   Scheduled Meds:    Current Facility-Administered Medications:  acetaminophen 650 mg Oral Q4H PRN Meg Spironello VTANIANP   amLODIPine 10 mg Oral Daily Ruben Friend PA-C   aspirin 81 mg Oral Daily Ruben Friend PA-C   atorvastatin 80 mg Oral Daily With KeySpanBHAVIK   bisacodyl 10 mg Rectal Daily PRN Rudolph Hedrick PA-C   citalopram 40 mg Oral Daily Ruben Friend PA-C   clopidogrel 75 mg Oral Daily Ruben Friend PA-C   docusate sodium 100 mg Oral BID Bere Hagan PA-C   fentanyl citrate (PF) 50 mcg Intravenous Once Rudolph Hedrick PA-C   furosemide 40 mg Oral Daily PAULETTE Leija   heparin (porcine) 5,000 Units Subcutaneous Q8H [facility] Ruben Friend PA-C   metoprolol tartrate 12 5 mg Oral Q12H [facility] Alexandru Aquino PA-C   mupirocin 1 application Nasal T70O [facility] Ruben Friend PA-C   ondansetron 4 mg Intravenous Q6H PRN Ruben Friend PA-C   oxyCODONE 5 mg Oral Q4H PRN PAULETTE Leija   pantoprazole 40 mg Oral Early Morning Ruben Friend PA-C   polyethylene glycol 17 g Oral Daily Ruben Friend PA-C     Continuous Infusions:   PRN Meds:   acetaminophen    bisacodyl    ondansetron    oxyCODONE    Vitals:   Vitals:    10/19/18 0314 10/19/18 0350 10/19/18 0600 10/19/18 0708   BP: 112/52   121/57   BP Location: Right arm   Right arm   Pulse: 81   83   Resp: 18   18   Temp: 100 1 °F (37 8 °C)   99 8 °F (37 7 °C)   TempSrc: Oral   Oral SpO2: 92% 93%  90%   Weight:   84 1 kg (185 lb 6 5 oz)    Height:           Telemetry: NSR; Heart Rate: 90s    Respiratory:   SpO2: SpO2: 90 %; 2 LPM    Intake/Output:     Intake/Output Summary (Last 24 hours) at 10/19/18 0842  Last data filed at 10/19/18 0255   Gross per 24 hour   Intake              805 ml   Output              900 ml   Net              -95 ml         Weights:   Weight (last 2 days)     Date/Time   Weight    10/19/18 0600  84 1 (185 41)    10/18/18 0600  83 7 (184 53)    10/17/18 0500  76 9 (169 53)            Admit weight: 82 1    Results:     Results from last 7 days  Lab Units 10/19/18  0522 10/18/18  0524 10/17/18  0434   WBC Thousand/uL 13 13* 13 44* 14 72*   HEMOGLOBIN g/dL 9 6* 10 0* 10 4*   HEMATOCRIT % 31 6* 33 1* 33 6*   PLATELETS Thousands/uL 299 288 323       Results from last 7 days  Lab Units 10/19/18  0522 10/18/18  0524 10/17/18  0434  10/16/18  1050   SODIUM mmol/L 135* 136 136  < >  --    POTASSIUM mmol/L 4 1 3 6 3 8  < >  --    CHLORIDE mmol/L 99* 100 103  < >  --    CO2 mmol/L 31 30 29  < >  --    BUN mg/dL 17 12 10  < >  --    CREATININE mg/dL 0 75 0 69 0 70  < >  --    GLUCOSE, ISTAT mg/dl  --   --   --   --  122   CALCIUM mg/dL 8 0* 7 5* 7 1*  < >  --    < > = values in this interval not displayed  Studies:  TTE:   SUMMARY     LEFT VENTRICLE:  Systolic function was normal  Ejection fraction was estimated to be 60 %  Although no diagnostic regional wall motion abnormality was identified, this possibility cannot be completely excluded on the basis of this study  Wall thickness was mildly increased  There was mild concentric hypertrophy    Features were consistent with a pseudonormal left ventricular filling pattern, with concomitant abnormal relaxation and increased filling pressure (grade 2 diastolic dysfunction)      LEFT ATRIUM:  The atrium was moderately dilated      RIGHT ATRIUM:  The atrium was mildly to moderately dilated      MITRAL VALVE:  There was moderate to marked annular calcification  There was trace regurgitation      AORTIC VALVE:  A bioprosthesis (#23 Lou Evelia 3 TAVR) was present  The prosthesis was well-seated without stenosis or regurgitation  Peak and mean velocities (gradients) were 2 26 (20) and 1 56 (12) m/sec (mmHg), respectively  The calculated aortic valve area was 1 8 cm2 using continuity equation      TRICUSPID VALVE:  There was trace regurgitation  Estimated peak PA pressure was 56 mmHg  The findings suggest moderate pulmonary hypertension      PULMONIC VALVE:  There was trace regurgitation  Invasive Lines/Tubes:  Invasive Devices     Central Venous Catheter Line            CVC Central Lines 10/16/18 Triple Right Internal jugular 2 days          Peripheral Intravenous Line            Peripheral IV 10/16/18 Left Wrist 2 days                Physical Exam:    HEENT/NECK:  PERRLA  No jugular venous distention  Cardiac: Regular rate and rhythm  No rubs/murmurs/gallops  Pulmonary:  Breath sounds slightly diminished at the bases bilaterally  Abdomen:  Non-tender, Non-distended  Positive bowel sounds  Incisions: Groins soft, no hematoma  Lower extremities: Extremities warm/dry  Radial/PT/DP pulses 2+ bilaterally  No edema B/L  Neuro: Alert and oriented X 3  Sensation is grossly intact  No focal deficits  Skin: Warm/Dry, without rashes or lesions      Assessment:  Patient Active Problem List   Diagnosis    CHF (congestive heart failure) ([address] )    Hypertension    Moderate to severe aortic stenosis    Aortic stenosis, severe    Coronary artery disease involving native coronary artery of native heart    HLD (hyperlipidemia)    History of Hodgkin's lymphoma    Anxiety    S/P pericardial window creation    Acute postoperative pulmonary insufficiency (HCC)    S/p TAVR (transcatheter aortic valve replacement), bioprosthetic    CAD (coronary artery disease)    Hyperlipidemia    CHB (complete heart block) ([address] )    Presence of permanent cardiac pacemaker       Severe AS  S/P Transcatheter aortic valve replacement with a 23 mm Luo LEODAN 3 bioprosthetic valve via left transfemoral approach; POD # 3      Plan:    1  Cardiac:   NSR; HR/BP well-controlled  Lopressor 12 5 mg PO BID  Continue ASA, plavix and Statin therapy  Central IV access no longer required; Remove central venous catheter today  Continue DVT prophylaxis    2  Pulmonary:   Respiratory insufficiency etiology unknown  Believe hypoxia secondary to capillary constriction peripherally  Will attempt pulse-ox at  alternate location  Would possibly benefit from ABG as baseline preop testing was inaccurate and unable to be  completed per patient  Will aggressively ambulate today  Encourage IS  Deep breathing  3  Renal:   Intake/Output net: -100 mL/24 hours  Continue diuresis   Lasix 40 mg IV QD  Potassium Chloride 20 mEq PO QD  Post op Creatinine stable; Follow up labs prn    4  Neuro:  Neurologically intact; No active issues  Incisional pain well-controlled; Continue prn Percocet    5  GI:  Tolerating TLC 2 3 gm sodium diet  Maintain 1800 mL daily fluid restriction   Continue stool softeners and prn suppository  Continue GI prophylaxis    6  Endo:    No history of diabetes; Glucose well-controlled with sliding scale coverage    7  Hematology:   Post-operative acute blood loss anemia; Hemoglobin and hematocrit stable; trend prn    8  Disposition:  Anticipate discharge to home, home PT/OT        VTE Pharmacologic Prophylaxis: Heparin  VTE Mechanical Prophylaxis: sequential compression device    Collaborative rounds completed with TOMI Mendoza , and Ya Molina RN    SIGNATURE: Apollo Mueller PA-C  DATE: October 19, 2018  TIME: 8:42 AM

## 2018-10-26 ENCOUNTER — TELEPHONE (OUTPATIENT)
Dept: CARDIAC SURGERY | Facility: CLINIC | Age: 53
End: 2018-10-26

## 2018-10-26 NOTE — TELEPHONE ENCOUNTER
Call placed to patient to follow-up after discharge from hospital, post-op  Spoke to: Patient  Procedure & Date: TAVR 10/16/2018  Surgeon: Drew       Pre-op wt: 181 lb D/C wt: 185 lb  Current wt: No scale, but no swelling    Fever/chills: No     Lightheadedness/dizziness: No     Angina: No     SOB: Only with activity, none when sitting/laying flat     Pain: No    Edema: No    Incision: Clean/dry/intact, no s/s of infection    GI: BM stable, no GI issues     Activity: Tolerating well, slight SOB when walking    Follow-up APPTs: Cardiology: 10/30/18, CT surgery: 11/15/18, PCP: 10/24/18    Comments: Patient states she gets occasional SOB with activity, subsides when she relaxes and none when she is sitting or laying flat  Stated VNA has been checking her vitals/incisions and everything has been said to be WNL, next VNA visit will be on Monday 10/29  Otherwise she states she is doing well, has no questions/concerns  Education:  1  Daily AM weight, call for weight gain of 2 lbs or more in 24 hours  2  Take frequent short walks, increase activity as tolerated  3  Maintain sternal precautions: No strenuous activity; no lifting more than 10 lbs;  no driving  4  Continue to use Incentive Spirometer frequently throughout the day  5  Shower daily; Cleanse incisions with antibacterial soap and water  6  No ointments, powder or lotions on surgical incisions  7   Call [phone] Replaced by [facility] office with questions or concerns

## 2018-11-02 ENCOUNTER — OFFICE VISIT (OUTPATIENT)
Dept: CARDIOLOGY CLINIC | Facility: CLINIC | Age: 53
End: 2018-11-02
Payer: COMMERCIAL

## 2018-11-02 ENCOUNTER — IN-CLINIC DEVICE VISIT (OUTPATIENT)
Dept: CARDIOLOGY CLINIC | Facility: CLINIC | Age: 53
End: 2018-11-02

## 2018-11-02 VITALS
DIASTOLIC BLOOD PRESSURE: 64 MMHG | WEIGHT: 172 LBS | BODY MASS INDEX: 28.66 KG/M2 | SYSTOLIC BLOOD PRESSURE: 90 MMHG | HEIGHT: 65 IN | HEART RATE: 89 BPM | OXYGEN SATURATION: 98 %

## 2018-11-02 DIAGNOSIS — Z95.0 PRESENCE OF PERMANENT CARDIAC PACEMAKER: ICD-10-CM

## 2018-11-02 DIAGNOSIS — I10 ESSENTIAL HYPERTENSION: Chronic | ICD-10-CM

## 2018-11-02 DIAGNOSIS — Z95.3 S/P TAVR (TRANSCATHETER AORTIC VALVE REPLACEMENT), BIOPROSTHETIC: ICD-10-CM

## 2018-11-02 DIAGNOSIS — I45.9 HEART BLOCK: ICD-10-CM

## 2018-11-02 DIAGNOSIS — Z95.0 CARDIAC PACEMAKER IN SITU: Primary | ICD-10-CM

## 2018-11-02 DIAGNOSIS — Z95.2 S/P TAVR (TRANSCATHETER AORTIC VALVE REPLACEMENT): ICD-10-CM

## 2018-11-02 DIAGNOSIS — I44.2 CHB (COMPLETE HEART BLOCK) (HCC): ICD-10-CM

## 2018-11-02 DIAGNOSIS — I10 ESSENTIAL HYPERTENSION: Primary | ICD-10-CM

## 2018-11-02 DIAGNOSIS — I25.10 CORONARY ARTERY DISEASE INVOLVING NATIVE CORONARY ARTERY OF NATIVE HEART WITHOUT ANGINA PECTORIS: Primary | Chronic | ICD-10-CM

## 2018-11-02 DIAGNOSIS — I35.0 AORTIC STENOSIS, MODERATE: ICD-10-CM

## 2018-11-02 DIAGNOSIS — Z98.890 S/P PERICARDIAL WINDOW CREATION: ICD-10-CM

## 2018-11-02 DIAGNOSIS — I50.42 CHRONIC COMBINED SYSTOLIC AND DIASTOLIC CONGESTIVE HEART FAILURE (HCC): Chronic | ICD-10-CM

## 2018-11-02 DIAGNOSIS — I35.0 AORTIC STENOSIS, SEVERE: ICD-10-CM

## 2018-11-02 DIAGNOSIS — E78.2 MIXED HYPERLIPIDEMIA: ICD-10-CM

## 2018-11-02 DIAGNOSIS — I50.31 ACUTE DIASTOLIC CONGESTIVE HEART FAILURE (HCC): ICD-10-CM

## 2018-11-02 PROCEDURE — 99024 POSTOP FOLLOW-UP VISIT: CPT | Performed by: INTERNAL MEDICINE

## 2018-11-02 RX ORDER — ATORVASTATIN CALCIUM 80 MG/1
80 TABLET, FILM COATED ORAL DAILY
Qty: 90 TABLET | Refills: 3 | Status: SHIPPED | OUTPATIENT
Start: 2018-11-02 | End: 2021-02-15 | Stop reason: SDUPTHER

## 2018-11-02 RX ORDER — FUROSEMIDE 20 MG/1
20 TABLET ORAL DAILY
Qty: 30 TABLET | Refills: 11 | Status: SHIPPED | OUTPATIENT
Start: 2018-11-02 | End: 2021-02-15 | Stop reason: SDUPTHER

## 2018-11-02 RX ORDER — CLOPIDOGREL BISULFATE 75 MG/1
75 TABLET ORAL DAILY
Qty: 90 TABLET | Refills: 3 | Status: SHIPPED | OUTPATIENT
Start: 2018-11-02 | End: 2021-01-12

## 2018-11-02 RX ORDER — FUROSEMIDE 40 MG/1
40 TABLET ORAL DAILY
Qty: 90 TABLET | Refills: 3 | Status: CANCELLED | OUTPATIENT
Start: 2018-11-02

## 2018-11-02 RX ORDER — AMLODIPINE BESYLATE 10 MG/1
10 TABLET ORAL DAILY
Qty: 90 TABLET | Refills: 3 | Status: CANCELLED | OUTPATIENT
Start: 2018-11-02

## 2018-11-02 RX ORDER — LISINOPRIL 5 MG/1
5 TABLET ORAL DAILY
Qty: 30 TABLET | Refills: 11 | Status: SHIPPED | OUTPATIENT
Start: 2018-11-02 | End: 2019-12-05 | Stop reason: SDUPTHER

## 2018-11-02 NOTE — PROGRESS NOTES
Results for orders placed or performed in visit on 11/02/18   Cardiac EP device report    Narrative    WOUND CHECK: INCISION CLEAN AND DRY WITH EDGES APPROXIMATED; WOUND CARE AND RESTRICTIONS REVIEWED WITH PATIENT  DEVICE INTERROGATED IN THE [facility] OFFICE: PT SEEING DR Darby Dumont VOLTAGE ADEQUATE  AP 0%   68%  ALL AVAILABLE LEAD PARAMETERS & TRENDS WITHIN NORMAL LIMITS  NO SIGNIFICANT HIGH RATE EPISODES  NO PROGRAMMING CHANGES MADE TO DEVICE PARAMETERS  NORMAL DEVICE FUNCTION   NC

## 2018-11-02 NOTE — PROGRESS NOTES
Cardiology Consultation     Syed Damian  [id number]  1965  [facility] [address] [address]    Chief complaints:  Hospital follow-up after TAVR in pacemaker implant    History of present illness:  16-year-old female patient with past medical history of severe aortic stenosis status post TAVR, pacemaker implant for complete heart block after TAVR, mild coronary disease, hyperlipidemia, history of pericardial window, systolic CHF is here for follow-up  Patient doing well after discharge she does complain of some tiredness and shortness of breath on exertion  She denies having any palpitations, dizziness or syncope  She did have some right-sided chest pain which resolved spontaneously  She had pacemaker interrogation done today which showed pacing 68% of times, normal function      Patient Active Problem List   Diagnosis    CHF (congestive heart failure) (HCC)    Hypertension    Moderate to severe aortic stenosis    Aortic stenosis, severe    Coronary artery disease involving native coronary artery of native heart    HLD (hyperlipidemia)    History of Hodgkin's lymphoma    Anxiety    S/P pericardial window creation    S/p TAVR (transcatheter aortic valve replacement), bioprosthetic    CAD (coronary artery disease)    Hyperlipidemia    CHB (complete heart block) (HCC)    Presence of permanent cardiac pacemaker     Past Medical History:   Diagnosis Date    Anxiety     CAD (coronary artery disease)     CHF (congestive heart failure) (HCC)     Colon tumor     s/p resection    Hodgkin's disease ([facility] [address] )     age 7, s/p radiation & chemotherapy    Hyperlipidemia     Hypertension     LBBB (left bundle branch block)     Severe aortic stenosis      Social History     Social History    Marital status: /Civil Union     Spouse name: N/A    Number of children: N/A    Years of education: N/A     Occupational History    Not on file  Social History Main Topics    Smoking status: Never Smoker    Smokeless tobacco: Never Used    Alcohol use No    Drug use: No    Sexual activity: No     Other Topics Concern    Not on file     Social History Narrative    No narrative on file      Family History   Problem Relation Age of Onset    Hyperlipidemia Mother     Hypertension Mother     Diabetes Mother      Past Surgical History:   Procedure Laterality Date    CARDIAC CATHETERIZATION      COLON SURGERY      non cancerous tumor    NECK SURGERY      PERICARDIAL WINDOW      KS ECHO TRANSESOPHAG R-T 2D W/PRB IMG ACQUISJ I&R N/A 10/16/2018    Procedure: TRANSESOPHAGEAL ECHOCARDIOGRAM (MICHAEL); Surgeon: Debria Hatchet, MD;  Location: [facility];  Service: Cardiac Surgery    KS REPLACE AORTIC VALVE OPENFEMORAL ARTERY APPROACH N/A 10/16/2018    Procedure: REPLACEMENT AORTIC VALVE TRANSCATHETER (TAVR) TRANSFEMORAL W/ 23 MM DE LA ROSA LEODAN S3 VALVE (ACCESS ON LEFT);   Surgeon: Debria Hatchet, MD;  Location[facility];  Service: Cardiac Surgery    REPLACEMENT AORTIC VALVE TRANSCATHETER (TAVR)      TOTAL ABDOMINAL HYSTERECTOMY      TUMOR REMOVAL         Current Outpatient Prescriptions:     acetaminophen (TYLENOL) 325 mg tablet, 650 mg every 4 to 6 hours as needed for pain, Disp: 30 tablet, Rfl: 0    amLODIPine (NORVASC) 10 mg tablet, Take 10 mg by mouth daily  , Disp: , Rfl:     aspirin (ECOTRIN LOW STRENGTH) 81 mg EC tablet, Take 81 mg by mouth daily, Disp: , Rfl:     atorvastatin (LIPITOR) 80 mg tablet, Take 80 mg by mouth daily  , Disp: , Rfl:     citalopram (CeleXA) 40 mg tablet, Take 40 mg by mouth daily  , Disp: , Rfl:     clopidogrel (PLAVIX) 75 mg tablet, Take 1 tablet (75 mg total) by mouth daily, Disp: 30 tablet, Rfl: 2    docusate sodium (COLACE) 100 mg capsule, Take 1 capsule (100 mg total) by mouth 2 (two) times a day as needed for constipation, Disp: 10 capsule, Rfl: 0    furosemide (LASIX) 40 mg tablet, Take 40 mg by mouth daily, Disp: , Rfl:     metoprolol tartrate (LOPRESSOR) 25 mg tablet, Take 0 5 tablets (12 5 mg total) by mouth every 12 (twelve) hours, Disp: 30 tablet, Rfl: 2    pantoprazole (PROTONIX) 40 mg tablet, Take 40 mg by mouth daily  , Disp: , Rfl:     potassium chloride (K-DUR,KLOR-CON) 20 mEq tablet, Take 1 tablet (20 mEq total) by mouth daily, Disp: 30 tablet, Rfl: 0  No Known Allergies  Vitals:    11/02/18 1257   BP: 90/64   Pulse: 89   SpO2: 98%   Weight: 78 kg (172 lb)   Height: 5' 5" (1 651 m)       Labs:  Admission on 10/16/2018, Discharged on 10/19/2018   No results displayed because visit has over 200 results        Hospital Outpatient Visit on 10/11/2018   Component Date Value    pH, Art i-STAT 10/11/2018 7 533*    pCO2, Art i-STAT 10/11/2018 44 9*    pO2, ART i-STAT 10/11/2018 30 0*    BE, i-STAT 10/11/2018 14*    HCO3, Art i-STAT 10/11/2018 37 8*    CO2, i-STAT 10/11/2018 39*    O2 Sat, i-STAT 10/11/2018 64*    SODIUM, I-STAT 10/11/2018 133*    Potassium, i-STAT 10/11/2018 2 8*    Calcium, Ionized i-STAT 10/11/2018 1 09*    Hct, i-STAT 10/11/2018 36     Hgb, i-STAT 10/11/2018 12 2     Glucose, i-STAT 10/11/2018 111     POC FIO2 10/11/2018 21     Specimen Type 10/11/2018 ARTERIAL    Appointment on 10/11/2018   Component Date Value    Protime 10/11/2018 13 5     INR 10/11/2018 1 04     WBC 10/11/2018 10 51*    RBC 10/11/2018 4 82     Hemoglobin 10/11/2018 13 6     Hematocrit 10/11/2018 42 6     MCV 10/11/2018 88     MCH 10/11/2018 28 2     MCHC 10/11/2018 31 9     RDW 10/11/2018 15 0     Platelets 94/33/6566 447*    MPV 10/11/2018 10 3     MRSA Culture Only 10/11/2018 No Methicillin Resistant Staphlyococcus aureus (MRSA) isolated     Sodium 10/11/2018 138     Potassium 10/11/2018 3 3*    Chloride 10/11/2018 98*    CO2 10/11/2018 38*    ANION GAP 10/11/2018 2*    BUN 10/11/2018 14     Creatinine 10/11/2018 0 87  Glucose, Fasting 10/11/2018 118*    Calcium 10/11/2018 8 4     AST 10/11/2018 20     ALT 10/11/2018 26     Alkaline Phosphatase 10/11/2018 124*    Total Protein 10/11/2018 8 2     Albumin 10/11/2018 3 6     Total Bilirubin 10/11/2018 0 40     eGFR 10/11/2018 77     Hemoglobin A1C 10/11/2018 6 8*    EAG 10/11/2018 148    Office Visit on 09/20/2018   Component Date Value    Clarity, UA 10/11/2018 Clear     Color, UA 10/11/2018 Yellow     Specific Gravity, UA 10/11/2018 1 010     pH, UA 10/11/2018 6 0     Glucose, UA 10/11/2018 Negative     Ketones, UA 10/11/2018 Negative     Blood, UA 10/11/2018 Moderate*    Protein, UA 10/11/2018 Negative     Nitrite, UA 10/11/2018 Negative     Bilirubin, UA 10/11/2018 Negative     Urobilinogen, UA 10/11/2018 0 2     Leukocytes, UA 10/11/2018 Moderate*    WBC, UA 10/11/2018 Innumerable*    RBC, UA 10/11/2018 Innumerable*    Bacteria, UA 10/11/2018 Occasional     Epithelial Cells 10/11/2018 Occasional      Imaging: Xr Chest Portable    Result Date: 10/17/2018  Narrative: CHEST INDICATION:   Follow-up post valve replacement  COMPARISON:  10/16/2018  EXAM PERFORMED/VIEWS:  XR CHEST PORTABLE FINDINGS:  Left chest wall pacemaker is present  Cardiac valve prosthesis is noted  Right jugular central venous catheter tip projects over the cavoatrial junction  Cardiomediastinal silhouette appears unremarkable  Subsegmental atelectasis is present lung bases  Osseous structures appear within normal limits for patient age  Impression: Bibasilar subsegmental atelectasis  Workstation performed: [id number]     Xr Chest Portable    Result Date: 10/17/2018  Narrative: CHEST INDICATION:   post pacemaker  COMPARISON:  10/16/2018  EXAM PERFORMED/VIEWS:  XR CHEST PORTABLE FINDINGS:  Left chest wall pacemaker is present  Lines and tubes are unchanged  Cardiac mediastinal silhouette is stable  Cardiac valve prosthesis is present   Mild pulmonary vascular congestion is present  Osseous structures appear within normal limits for patient age  Impression: Mild pulmonary vascular congestion  Workstation performed: [id number]     Xr Chest Pa & Lateral    Result Date: 10/19/2018  Narrative: CHEST INDICATION:   Hypoxia  COMPARISON:  Chest x-ray 10/17/2018 EXAM PERFORMED/VIEWS:  XR CHEST PA & LATERAL FINDINGS:  Left chest wall pacemaker is present with intact leads  Cardiac valve prosthesis is noted  Right internal jugular venous catheter tip overlies the right atrium  Surgical clips overlie the abdomen  Cardiomediastinal silhouette appears unremarkable  Improvement of pulmonary vascular congestion  Persistent obscuration of the costophrenic angles compatible with atelectasis or trace pleural effusion  No pneumothorax  Osseous structures appear within normal limits for patient age  Impression: Improved pulmonary vascular congestion  Workstation performed: [id number]     Xr Chest Portable Icu    Result Date: 10/16/2018  Narrative: CHEST INDICATION:   S/P TAVR  COMPARISON:  August 1, 2018 EXAM PERFORMED/VIEWS:  XR CHEST PORTABLE ICU Single image FINDINGS:  Lungs are suboptimally aerated  Scattered infiltrative or atelectatic changes bilaterally  No lobar consolidation or large effusion  Cardiac size mildly enlarged  Mild vascular congestion with peribronchial thickening  TAVR device projecting over the central cardiac silhouette  Endotracheal tube tip at the leonard entering right mainstem bronchus, suggest retracting the endotracheal tube 3 cm (if not already performed)  Right jugular cordis projecting over the mid SVC  Right jugular central line tip in the right atrium (suggest retracting the smaller caliber central line approximately 5 cm if not already performed)  Numerous EKG leads in place  The NG tube is in the stomach  Stable Surgical clips in the left upper abdomen  No pneumothorax   Bony structures otherwise stable     Impression: Diminished lung volumes with infiltrates or atelectasis bilaterally  No lobar consolidation or large effusion  Cardiomegaly with pulmonary edema  Endotracheal tube, central line, and jugular cordis as described  No pneumothorax  I personally discussed this study with the covering surgical physician on 10/16/2018 at 2:56 PM  Workstation performed: [id number]     Cta Chest Pe Study    Result Date: 10/19/2018  Narrative: CTA - CHEST WITH IV CONTRAST - PULMONARY ANGIOGRAM INDICATION:   Chest pain, acute, PE suspected, low pretest prob  COMPARISON: CT 9/11/2018 TECHNIQUE: CTA examination of the chest was performed using angiographic technique according to a protocol specifically tailored to evaluate for pulmonary embolism  Axial, sagittal, and coronal 2D reformatted images were created from the source data and  submitted for interpretation  In addition, coronal 3D MIP postprocessing was performed on the acquisition scanner  Radiation dose length product (DLP) for this visit:  321 mGy-cm   This examination, like all CT scans performed in the [facility], was performed utilizing techniques to minimize radiation dose exposure, including the use of iterative reconstruction and automated exposure control  IV Contrast:  57 mL of iohexol (OMNIPAQUE)    FINDINGS: PULMONARY ARTERIAL TREE:  No pulmonary embolus is seen  LUNGS: Right greater than left subsegmental atelectasis subjacent to the small pleural effusions  Also small amount of fluid within the right major fissure  Several subcentimeter pulmonary nodules  The more pronounced nodules include: Right upper lobe 3 mm nodules (series 3 images 53 and 55), unchanged from comparison to study  Two 3 mm nodules in the right apex (series 3 image 31)  These are not covered in the field-of-view on prior exam   Right middle lobe calcified granulomas  PLEURA:  Right greater than left small pleural effusions    No pneumothorax HEART/AORTA:  Postsurgical changes of aortic valve replacement  Cardiomegaly MEDIASTINUM AND EMILY:  Unremarkable  Redemonstration of a dilated esophagus containing ingested materials  CHEST WALL AND LOWER NECK:   Left chest dual-lead cardiac pacer device with the leads within the right atrium and Ventricles  Right IJ central venous catheter tip terminates within the right atrium  VISUALIZED STRUCTURES IN THE UPPER ABDOMEN:  Redemonstration of postsurgical changes in the upper abdomen  The remainder of visualized upper abdomen is unremarkable within limitation of contrast phase  OSSEOUS STRUCTURES:  No acute fracture or destructive osseous lesion  Impression: 1  No pulmonary emboli  2   New right greater than left small pleural effusions with subjacent compressive subsegmental atelectasis  Cannot exclude underlying occult aspiration/infection  3   Interval new post surgical changes of aortic valve replacement  Grossly stable cardiomegaly  4   Redemonstration of dilated esophagus containing ingested materials  5   Sub-4 mm noncalcified pulmonary nodules  Based on current Fleischner Society 2017 Guidelines on incidental pulmonary nodule, no routine follow-up is needed if the patient is considered low risk for lung cancer  If the patient is considered high risk  for lung cancer, 12 month follow-up non-contrast chest CT is recommended  Workstation performed: [id number]       Review of Systems:  Review of Systems   Constitutional: Positive for fatigue  Negative for diaphoresis  HENT: Negative for congestion and facial swelling  Eyes: Negative for photophobia and visual disturbance  Respiratory: Positive for shortness of breath  Negative for chest tightness  Cardiovascular: Negative for chest pain, palpitations and leg swelling  Gastrointestinal: Negative for abdominal pain and nausea  Endocrine: Negative for cold intolerance and heat intolerance  Musculoskeletal: Negative for arthralgias and myalgias  Skin: Negative for pallor and rash  Neurological: Negative for dizziness and tremors  Psychiatric/Behavioral: Negative for sleep disturbance  The patient is not nervous/anxious  Physical Exam:  Physical Exam   Constitutional: She is oriented to person, place, and time  She appears well-developed and well-nourished  HENT:   Head: Normocephalic and atraumatic  Eyes: Pupils are equal, round, and reactive to light  Conjunctivae and EOM are normal    Neck: Normal range of motion  Neck supple  No JVD present  No thyromegaly present  Cardiovascular: Normal rate, regular rhythm, S1 normal, S2 normal and intact distal pulses  Exam reveals no gallop and no friction rub  Murmur heard  Systolic murmur is present with a grade of 2/6   Pulses:       Carotid pulses are 2+ on the right side, and 2+ on the left side  Pulmonary/Chest: Effort normal and breath sounds normal  No respiratory distress  She has no wheezes  She has no rales  Abdominal: Soft  Bowel sounds are normal  She exhibits no distension  There is no tenderness  There is no rebound and no guarding  Musculoskeletal: Normal range of motion  She exhibits no edema  Neurological: She is alert and oriented to person, place, and time  She has normal reflexes  No cranial nerve deficit  Skin: Skin is warm and dry  Psychiatric: She has a normal mood and affect  Discussion/Summary:  1  Severe aortic stenosis status post TAVR:  No complications  Doing well  Symptoms improved  Patient get TAVR echocardiogram, scheduled by [facility]     2  Chronic systolic/diastolic CHF  Reduce dose of Lasix, patient appears to be dehydrated  Fatigue is probably secondary to hypotension  Stop Norvasc, add low-dose ACE-inhibitor  3  Complete heart block status post pacemaker implant  Normal pacemaker function    4  Hypotension, reduce dose of Lasix  Patient was asked to monitor blood pressure at home and call me      Follow up with me in 3 months

## 2018-11-15 ENCOUNTER — OFFICE VISIT (OUTPATIENT)
Dept: CARDIAC SURGERY | Facility: CLINIC | Age: 53
End: 2018-11-15
Payer: COMMERCIAL

## 2018-11-15 ENCOUNTER — APPOINTMENT (OUTPATIENT)
Dept: LAB | Facility: HOSPITAL | Age: 53
End: 2018-11-15
Payer: COMMERCIAL

## 2018-11-15 ENCOUNTER — HOSPITAL ENCOUNTER (OUTPATIENT)
Dept: NON INVASIVE DIAGNOSTICS | Facility: HOSPITAL | Age: 53
Discharge: HOME | End: 2018-11-15
Attending: THORACIC SURGERY (CARDIOTHORACIC VASCULAR SURGERY)
Payer: COMMERCIAL

## 2018-11-15 VITALS
TEMPERATURE: 98.2 F | DIASTOLIC BLOOD PRESSURE: 62 MMHG | SYSTOLIC BLOOD PRESSURE: 112 MMHG | RESPIRATION RATE: 14 BRPM | HEART RATE: 84 BPM | BODY MASS INDEX: 28.82 KG/M2 | WEIGHT: 173 LBS | HEIGHT: 65 IN

## 2018-11-15 DIAGNOSIS — I35.0 SEVERE AORTIC STENOSIS: ICD-10-CM

## 2018-11-15 DIAGNOSIS — I35.0 AORTIC STENOSIS, SEVERE: Primary | ICD-10-CM

## 2018-11-15 DIAGNOSIS — I35.0 NONRHEUMATIC AORTIC VALVE STENOSIS: ICD-10-CM

## 2018-11-15 DIAGNOSIS — Z95.3 S/P TAVR (TRANSCATHETER AORTIC VALVE REPLACEMENT), BIOPROSTHETIC: ICD-10-CM

## 2018-11-15 DIAGNOSIS — Z48.89 ENCOUNTER FOR POSTOPERATIVE CARE: ICD-10-CM

## 2018-11-15 LAB
ANION GAP SERPL CALCULATED.3IONS-SCNC: 3 MMOL/L (ref 4–13)
ATRIAL RATE: 70 BPM
BUN SERPL-MCNC: 12 MG/DL (ref 5–25)
CALCIUM SERPL-MCNC: 8.3 MG/DL (ref 8.3–10.1)
CHLORIDE SERPL-SCNC: 104 MMOL/L (ref 100–108)
CO2 SERPL-SCNC: 28 MMOL/L (ref 21–32)
CREAT SERPL-MCNC: 0.87 MG/DL (ref 0.6–1.3)
ERYTHROCYTE [DISTWIDTH] IN BLOOD BY AUTOMATED COUNT: 15.1 % (ref 11.6–15.1)
GFR SERPL CREATININE-BSD FRML MDRD: 77 ML/MIN/1.73SQ M
GLUCOSE SERPL-MCNC: 120 MG/DL (ref 65–140)
HCT VFR BLD AUTO: 43.7 % (ref 34.8–46.1)
HGB BLD-MCNC: 13.3 G/DL (ref 11.5–15.4)
MCH RBC QN AUTO: 27 PG (ref 26.8–34.3)
MCHC RBC AUTO-ENTMCNC: 30.4 G/DL (ref 31.4–37.4)
MCV RBC AUTO: 89 FL (ref 82–98)
P AXIS: 68 DEGREES
PLATELET # BLD AUTO: 362 THOUSANDS/UL (ref 149–390)
PMV BLD AUTO: 10.2 FL (ref 8.9–12.7)
POTASSIUM SERPL-SCNC: 4.4 MMOL/L (ref 3.5–5.3)
PR INTERVAL: 172 MS
QRS AXIS: 33 DEGREES
QRSD INTERVAL: 126 MS
QT INTERVAL: 460 MS
QTC INTERVAL: 496 MS
RBC # BLD AUTO: 4.93 MILLION/UL (ref 3.81–5.12)
SODIUM SERPL-SCNC: 135 MMOL/L (ref 136–145)
T WAVE AXIS: 95 DEGREES
VENTRICULAR RATE: 70 BPM
WBC # BLD AUTO: 8.74 THOUSAND/UL (ref 4.31–10.16)

## 2018-11-15 PROCEDURE — 93005 ELECTROCARDIOGRAM TRACING: CPT

## 2018-11-15 PROCEDURE — 93010 ELECTROCARDIOGRAM REPORT: CPT | Performed by: INTERNAL MEDICINE

## 2018-11-15 PROCEDURE — 85027 COMPLETE CBC AUTOMATED: CPT

## 2018-11-15 PROCEDURE — 36415 COLL VENOUS BLD VENIPUNCTURE: CPT

## 2018-11-15 PROCEDURE — 93306 TTE W/DOPPLER COMPLETE: CPT

## 2018-11-15 PROCEDURE — 99214 OFFICE O/P EST MOD 30 MIN: CPT | Performed by: NURSE PRACTITIONER

## 2018-11-15 PROCEDURE — 80048 BASIC METABOLIC PNL TOTAL CA: CPT

## 2018-11-15 PROCEDURE — 93306 TTE W/DOPPLER COMPLETE: CPT | Performed by: INTERNAL MEDICINE

## 2018-11-15 NOTE — PROGRESS NOTES
POST OP FOLLOW UP VISIT S/P TAVR    Procedure: S/P transfemoral transcatheter aortic valve replacement 23 mm Luo LEODAN 3, performed on 10/16/18  History: Nina Mack is a 48y o  year old female who presents to our office today for routine follow up care from transfemoral transcatheter aortic valve replacement  After her procedure she developed CHB and underwent PPM implantation  She recovered well and was discharged home on 10/19/18  She has had her PPM interrogated and is functioning appropriately  She has also had f/u with her cardiologist and PCP  Today she states she feels better  She is breathing better and her energy level is improved  She denies chest pain, SOB or ightheadedness  Her pacer site and groin incisions have healed well  Review of System:     History obtained from the patient  General ROS: negative  Psychological ROS: negative  Ophthalmic ROS: positive for - uses glasses  Hematological and Lymphatic ROS: negative  Respiratory ROS: no cough, shortness of breath, or wheezing  Cardiovascular ROS: no chest pain or dyspnea on exertion  Gastrointestinal ROS: no abdominal pain, change in bowel habits, or black or bloody stools  Genito-Urinary ROS: no dysuria, trouble voiding, or hematuria  Musculoskeletal ROS: negative  Neurological ROS: no TIA or stroke symptoms    Vital Signs:     Vitals:    11/15/18 1300 11/15/18 1318   BP: 110/68 112/62   BP Location: Left arm Right arm   Cuff Size: Adult    Pulse: 84    Resp: 14    Temp: 98 2 °F (36 8 °C)    TempSrc: Oral    Weight: 78 5 kg (173 lb)    Height: 5' 5" (1 651 m)        Home Medications:     Prior to Admission medications    Medication Sig Start Date End Date Taking?  Authorizing Provider   acetaminophen (TYLENOL) 325 mg tablet 650 mg every 4 to 6 hours as needed for pain 10/19/18   Rachel Foster PA-C   aspirin (ECOTRIN LOW STRENGTH) 81 mg EC tablet Take 81 mg by mouth daily    Historical Provider, MD   atorvastatin (LIPITOR) 80 mg tablet Take 1 tablet (80 mg total) by mouth daily 11/2/18   Sima Thompson MD   citalopram (CeleXA) 40 mg tablet Take 40 mg by mouth daily   6/12/18   Historical Provider, MD   clopidogrel (PLAVIX) 75 mg tablet Take 1 tablet (75 mg total) by mouth daily 11/2/18   Sima Thompson MD   docusate sodium (COLACE) 100 mg capsule Take 1 capsule (100 mg total) by mouth 2 (two) times a day as needed for constipation 10/19/18   Gwendolyn Otero PA-C   furosemide (LASIX) 20 mg tablet Take 1 tablet (20 mg total) by mouth daily 11/2/18   Sima Thompson MD   lisinopril (ZESTRIL) 5 mg tablet Take 1 tablet (5 mg total) by mouth daily 11/2/18   Sima Thompson MD   metoprolol tartrate (LOPRESSOR) 25 mg tablet Take 0 5 tablets (12 5 mg total) by mouth every 12 (twelve) hours 11/2/18   Sima Thompson MD   pantoprazole (PROTONIX) 40 mg tablet Take 40 mg by mouth daily   6/12/18   Historical Provider, MD   potassium chloride (K-DUR,KLOR-CON) 20 mEq tablet Take 1 tablet (20 mEq total) by mouth daily 10/19/18   Gwendolyn Otero PA-C       Physical Exam:    General: well developed, no acute distress  HEENT/NECK:  PERRLA  No jugular venous distention  Cardiac:Regular rate and rhythm, NO murmurs rubs or gallops  Pulmonary:Breath sounds clear bilaterally  Abdomen:  Non-tender, Non-distended  Positive bowel sounds  Upper extremities: 2+ radial pulses; brisk capillary refill  Lower extremities: Extremities warm/dry  Bilateral femoral pulses 2+ , no bruit; PT/DP pulses 2+ bilaterally  No edema B/L  Incisions: Inguinal puncture site is clean, dry, and intact  Neuro: Alert and oriented X 3  Sensation is grossly intact  No focal deficits  Skin: Warm/Dry, without rashes or lesions      Lab Results:       Results from last 7 days  Lab Units 11/15/18  1235   WBC Thousand/uL 8 74   HEMOGLOBIN g/dL 13 3   HEMATOCRIT % 43 7   PLATELETS Thousands/uL 362     BMP pending    Imaging Studies:     Transthoracic Echocardiogram:   TAVR prosthesis well seated with normal function and no PVL  EKG:   pending    I have personally reviewed pertinent reports  TAVR evaluation Assessment:     Jerrica Dean 122: I    Assessment: Aortic stenosis, Non-Rheumatic  S/P transfemoral transcatheter aortic valve replacement;    Plan:     Brad Orozco is making good progress in her recover following transfemoral transcatheter aortic valve replacement  She is at NYHA functional class I  Groin incisions are well healed  Weight and VS are stable  Recent echocardiogram demonstrates stable appearance with normal function and no PVL  ECG & BMP are pending, CBC is stable   I reviewed her medications and made no changes  Recommended Plavix therapy after TAVR is for 90 days and aspirin is lifelong  I have discussed the benefits of participating in cardiac rehabilitation and have encouraged her to to do so  Brad Orozco may resume driving and all normal activities  Brad Orozco has already been evaluated by her primary care physician and her cardiologist for ongoing medical care  Arrangements will be made for one year follow-up in our office with repeat echocardiogram, ECG, CBC & BMP  I have advised her to notify her PCP with any new concerns that may arise in the interim and to maintain routine follow up with their cardiologist  Brad Orozco was comfortable with our recommendations and her questions were answered to her satisfaction      PAULETTE Schaeffer  11/15/18  1:43 PM

## 2018-11-15 NOTE — LETTER
November 15, 2018     [address]  [address]    Patient: Kimi Paul   YOB: 1965   Date of Visit: 11/15/2018       Dear Dr Kandy Browne:    Thank you for referring iKmi Paul to me for evaluation  Below are my notes for this consultation  If you have questions, please do not hesitate to call me  I look forward to following your patient along with you  Sincerely,        Kassidy Mojica MD        CC: MD Nelida Ibrahim CRNP  11/15/2018  2:04 PM  Attested   POST OP FOLLOW UP VISIT S/P TAVR    Procedure: S/P transfemoral transcatheter aortic valve replacement 23 mm Luo LEODAN 3, performed on 10/16/18  History: Kimi Paul is a 48y o  year old female who presents to our office today for routine follow up care from transfemoral transcatheter aortic valve replacement  After her procedure she developed CHB and underwent PPM implantation  She recovered well and was discharged home on 10/19/18  She has had her PPM interrogated and is functioning appropriately  She has also had f/u with her cardiologist and PCP  Today she states she feels better  She is breathing better and her energy level is improved  She denies chest pain, SOB or ightheadedness  Her pacer site and groin incisions have healed well       Review of System:     History obtained from the patient  General ROS: negative  Psychological ROS: negative  Ophthalmic ROS: positive for - uses glasses  Hematological and Lymphatic ROS: negative  Respiratory ROS: no cough, shortness of breath, or wheezing  Cardiovascular ROS: no chest pain or dyspnea on exertion  Gastrointestinal ROS: no abdominal pain, change in bowel habits, or black or bloody stools  Genito-Urinary ROS: no dysuria, trouble voiding, or hematuria  Musculoskeletal ROS: negative  Neurological ROS: no TIA or stroke symptoms    Vital Signs:     Vitals:    11/15/18 1300 11/15/18 1318   BP: 110/68 112/62   BP Location: Left arm Right arm   Cuff Size: Adult    Pulse: 84    Resp: 14    Temp: 98 2 °F (36 8 °C)    TempSrc: Oral    Weight: 78 5 kg (173 lb)    Height: 5' 5" (1 651 m)        Home Medications:     Prior to Admission medications    Medication Sig Start Date End Date Taking? Authorizing Provider   acetaminophen (TYLENOL) 325 mg tablet 650 mg every 4 to 6 hours as needed for pain 10/19/18   Fuentes Matute PA-C   aspirin (ECOTRIN LOW STRENGTH) 81 mg EC tablet Take 81 mg by mouth daily    Historical Provider, MD   atorvastatin (LIPITOR) 80 mg tablet Take 1 tablet (80 mg total) by mouth daily 11/2/18   Gibran Cavanaugh MD   citalopram (CeleXA) 40 mg tablet Take 40 mg by mouth daily   6/12/18   Historical Provider, MD   clopidogrel (PLAVIX) 75 mg tablet Take 1 tablet (75 mg total) by mouth daily 11/2/18   Gibran Cavanaugh MD   docusate sodium (COLACE) 100 mg capsule Take 1 capsule (100 mg total) by mouth 2 (two) times a day as needed for constipation 10/19/18   Fuentes Matute PA-C   furosemide (LASIX) 20 mg tablet Take 1 tablet (20 mg total) by mouth daily 11/2/18   Gibran Cavanaugh MD   lisinopril (ZESTRIL) 5 mg tablet Take 1 tablet (5 mg total) by mouth daily 11/2/18   Gibran Cavanaugh MD   metoprolol tartrate (LOPRESSOR) 25 mg tablet Take 0 5 tablets (12 5 mg total) by mouth every 12 (twelve) hours 11/2/18   Gibran Cavanaugh MD   pantoprazole (PROTONIX) 40 mg tablet Take 40 mg by mouth daily   6/12/18   Historical Provider, MD   potassium chloride (K-DUR,KLOR-CON) 20 mEq tablet Take 1 tablet (20 mEq total) by mouth daily 10/19/18   Fuentes Matute PA-C       Physical Exam:    General: well developed, no acute distress  HEENT/NECK:  PERRLA  No jugular venous distention  Cardiac:Regular rate and rhythm, NO murmurs rubs or gallops  Pulmonary:Breath sounds clear bilaterally  Abdomen:  Non-tender, Non-distended  Positive bowel sounds    Upper extremities: 2+ radial pulses; brisk capillary refill  Lower extremities: Extremities warm/dry  Bilateral femoral pulses 2+ , no bruit; PT/DP pulses 2+ bilaterally  No edema B/L  Incisions: Inguinal puncture site is clean, dry, and intact  Neuro: Alert and oriented X 3  Sensation is grossly intact  No focal deficits  Skin: Warm/Dry, without rashes or lesions  Lab Results:       Results from last 7 days  Lab Units 11/15/18  1235   WBC Thousand/uL 8 74   HEMOGLOBIN g/dL 13 3   HEMATOCRIT % 43 7   PLATELETS Thousands/uL 362     BMP pending    Imaging Studies:     Transthoracic Echocardiogram:   TAVR prosthesis well seated with normal function and no PVL  EKG:   pending    I have personally reviewed pertinent reports  TAVR evaluation Assessment:     Jerrica Dean 122: I    Assessment: Aortic stenosis, Non-Rheumatic  S/P transfemoral transcatheter aortic valve replacement;    Plan:     Luanne Ireland is making good progress in her recover following transfemoral transcatheter aortic valve replacement  She is at NYHA functional class I  Groin incisions are well healed  Weight and VS are stable  Recent echocardiogram demonstrates stable appearance with normal function and no PVL  ECG & BMP are pending, CBC is stable   I reviewed her medications and made no changes  Recommended Plavix therapy after TAVR is for 90 days and aspirin is lifelong  I have discussed the benefits of participating in cardiac rehabilitation and have encouraged her to to do so  Luanne Ireland may resume driving and all normal activities  Luanne Ireland has already been evaluated by her primary care physician and her cardiologist for ongoing medical care  Arrangements will be made for one year follow-up in our office with repeat echocardiogram, ECG, CBC & BMP    I have advised her to notify her PCP with any new concerns that may arise in the interim and to maintain routine follow up with their cardiologist  Luanne Michaelann was comfortable with our recommendations and her questions were answered to her satisfaction  PAULETTE Gonzalez  11/15/18  1:43 PM  Attestation signed by Priyanka Esparza MD at 11/15/2018  2:50 PM:  Patient seen and evaluated with 10 Ina Sethi / BHAVIK  I agree with the above assessment and plan with the following additions      POP TAVR  Doing well, no complications    Plan:  Cardiac rehab  Follow up with PCP and cardiology  Follow up with TTE in 1 yr

## 2019-02-14 ENCOUNTER — IN-CLINIC DEVICE VISIT (OUTPATIENT)
Dept: CARDIOLOGY CLINIC | Facility: CLINIC | Age: 54
End: 2019-02-14
Payer: COMMERCIAL

## 2019-02-14 DIAGNOSIS — I44.2 CHB (COMPLETE HEART BLOCK) (HCC): Primary | ICD-10-CM

## 2019-02-14 DIAGNOSIS — Z95.0 PRESENCE OF PERMANENT CARDIAC PACEMAKER: ICD-10-CM

## 2019-02-14 PROCEDURE — 93280 PM DEVICE PROGR EVAL DUAL: CPT | Performed by: INTERNAL MEDICINE

## 2019-02-14 NOTE — PROGRESS NOTES
MDT DUAL CHAMBER PM  DEVICE INTERROGATED IN THE [place] OFFICE:  BATTERY VOLTAGE ADEQUATE (14 6 YR)   AP <0 1%  2 7%    ALL LEAD PARAMETERS WITHIN NORMAL LIMITS   NO SIGNIFICANT HIGH RATE EPISODES   NO PROGRAMMING CHANGES MADE TO DEVICE PARAMETERS   NORMAL DEVICE FUNCTION   RG

## 2019-03-19 ENCOUNTER — OFFICE VISIT (OUTPATIENT)
Dept: CARDIOLOGY CLINIC | Facility: CLINIC | Age: 54
End: 2019-03-19
Payer: COMMERCIAL

## 2019-03-19 VITALS
SYSTOLIC BLOOD PRESSURE: 140 MMHG | DIASTOLIC BLOOD PRESSURE: 80 MMHG | HEART RATE: 80 BPM | OXYGEN SATURATION: 97 % | WEIGHT: 176 LBS | BODY MASS INDEX: 29.32 KG/M2 | HEIGHT: 65 IN

## 2019-03-19 DIAGNOSIS — I25.10 CORONARY ARTERY DISEASE INVOLVING NATIVE CORONARY ARTERY OF NATIVE HEART WITHOUT ANGINA PECTORIS: ICD-10-CM

## 2019-03-19 DIAGNOSIS — I10 ESSENTIAL HYPERTENSION: Chronic | ICD-10-CM

## 2019-03-19 DIAGNOSIS — I50.32 CHRONIC DIASTOLIC CONGESTIVE HEART FAILURE (HCC): Chronic | ICD-10-CM

## 2019-03-19 DIAGNOSIS — Z95.3 S/P TAVR (TRANSCATHETER AORTIC VALVE REPLACEMENT), BIOPROSTHETIC: ICD-10-CM

## 2019-03-19 DIAGNOSIS — E78.2 MIXED HYPERLIPIDEMIA: ICD-10-CM

## 2019-03-19 DIAGNOSIS — Z95.0 PRESENCE OF PERMANENT CARDIAC PACEMAKER: ICD-10-CM

## 2019-03-19 DIAGNOSIS — Z98.890 S/P PERICARDIAL WINDOW CREATION: ICD-10-CM

## 2019-03-19 DIAGNOSIS — I35.0 AORTIC STENOSIS, SEVERE: Primary | ICD-10-CM

## 2019-03-19 DIAGNOSIS — I44.2 CHB (COMPLETE HEART BLOCK) (HCC): ICD-10-CM

## 2019-03-19 PROCEDURE — 99214 OFFICE O/P EST MOD 30 MIN: CPT | Performed by: INTERNAL MEDICINE

## 2019-03-19 RX ORDER — FOLIC ACID/VIT B COMPLEX AND C 0.8 MG
0.8 TABLET ORAL
COMMUNITY

## 2019-03-19 RX ORDER — PHENOL 1.4 %
AEROSOL, SPRAY (ML) MUCOUS MEMBRANE
COMMUNITY
End: 2021-01-12 | Stop reason: ALTCHOICE

## 2019-03-19 NOTE — PROGRESS NOTES
Cardiology Consultation     Fareed Fine  [id number]  1965  [facility] [address] [address]    Chief complaints:  Hospital follow-up after TAVR in pacemaker implant     History of present illness:  61-year-old female patient with past medical history of severe aortic stenosis status post TAVR, pacemaker implant for complete heart block after TAVR, mild coronary disease, hyperlipidemia, history of pericardial window, systolic CHF is here for follow-up  Patient complains of shortness of breath and fatigue on exertion which is unchanged from previous visit  Patient is not very active and does not do any formal exercise  She denies having any lower extremity edema, orthopnea paroxysmal nocturnal dyspnea  No significant weight gain recently    Recent echocardiogram showed normally functioning bioprosthetic valve      Recent pacemaker interrogation was normal    Patient Active Problem List   Diagnosis    [facility]est[facility]) [facility] [facility]    Hypertension    Moderate to severe aortic stenosis    Aortic stenosis, severe    Coronary artery disease involving native coronary artery of native heart    HLD (hyperlipidemia)    History of Hodgkin's lymphoma    Anxiety    S/P pericardial window creation    S/p TAVR (transcatheter aortic valve replacement), bioprosthetic    CAD (coronary artery disease)    Hyperlipidemia    CHB (complete heart block) (HCC)    Presence of permanent cardiac pacemaker    Encounter for postoperative care     Past Medical History:   Diagnosis Date    Anxiety     CAD (coronary artery disease)     CHF (congestive heart failure) (HCC)     Colon tumor     s/p resection    Hodgkin's disease ([facility] [address] )     age 7, s/p radiation & chemotherapy    Hyperlipidemia     Hypertension     LBBB (left bundle branch block)     Severe aortic stenosis      Social History Socioeconomic History    Marital status: /Civil Union     Spouse name: Not on file    Number of children: Not on file    Years of education: Not on file    Highest education level: Not on file   Occupational History    Not on file   Social Needs    Financial resource strain: Not on file    Food insecurity:     Worry: Not on file     Inability: Not on file    Transportation needs:     Medical: Not on file     Non-medical: Not on file   Tobacco Use    Smoking status: Never Smoker    Smokeless tobacco: Never Used   Substance and Sexual Activity    Alcohol use: No    Drug use: No    Sexual activity: Never     Birth control/protection: Post-menopausal, Female Sterilization   Lifestyle    Physical activity:     Days per week: Not on file     Minutes per session: Not on file    Stress: Not on file   Relationships    Social connections:     Talks on phone: Not on file     Gets together: Not on file     Attends [employer] service: Not on file     Active member of club or organization: Not on file     Attends meetings of clubs or organizations: Not on file     Relationship status: Not on file    Intimate partner violence:     Fear of current or ex partner: Not on file     Emotionally abused: Not on file     Physically abused: Not on file     Forced sexual activity: Not on file   Other Topics Concern    Not on file   Social History Narrative    Not on file      Family History   Problem Relation Age of Onset    Hyperlipidemia Mother     Hypertension Mother     Diabetes Mother      Past Surgical History:   Procedure Laterality Date    CARDIAC CATHETERIZATION      COLON SURGERY      non cancerous tumor    NECK SURGERY      PERICARDIAL WINDOW      [place] ECHO TRANSESOPHAG R-T 2D W/PRB IMG ACQUISJ I&R N/A 10/16/2018    Procedure: TRANSESOPHAGEAL ECHOCARDIOGRAM (MICHAEL);   Surgeon: Eri Tabor MD;  [facility];  Service: Cardiac Surgery    [place] REPLACE AORTIC VALVE OPENFEMORAL ARTERY APPROACH N/A 10/16/2018    Procedure: REPLACEMENT AORTIC VALVE TRANSCATHETER (TAVR) TRANSFEMORAL W/ 23 MM DE LA ROSA LEODAN S3 VALVE (ACCESS ON LEFT); Surgeon: Geremias Bustamante MD;  [facility];  Service: Cardiac Surgery    REPLACEMENT AORTIC VALVE TRANSCATHETER (TAVR)      TOTAL ABDOMINAL HYSTERECTOMY      TUMOR REMOVAL         Current Outpatient Medications:     acetaminophen (TYLENOL) 325 mg tablet, 650 mg every 4 to 6 hours as needed for pain, Disp: 30 tablet, Rfl: 0    aspirin (ECOTRIN LOW STRENGTH) 81 mg EC tablet, Take 81 mg by mouth daily, Disp: , Rfl:     atorvastatin (LIPITOR) 80 mg tablet, Take 1 tablet (80 mg total) by mouth daily, Disp: 90 tablet, Rfl: 3    b complex-C-folic acid (NEPHRO-ESTER) 0 8 mg tablet, Take 0 8 mg by mouth daily with dinner, Disp: , Rfl:     citalopram (CeleXA) 40 mg tablet, Take 40 mg by mouth daily  , Disp: , Rfl:     clopidogrel (PLAVIX) 75 mg tablet, Take 1 tablet (75 mg total) by mouth daily, Disp: 90 tablet, Rfl: 3    furosemide (LASIX) 20 mg tablet, Take 1 tablet (20 mg total) by mouth daily, Disp: 30 tablet, Rfl: 11    lisinopril (ZESTRIL) 5 mg tablet, Take 1 tablet (5 mg total) by mouth daily, Disp: 30 tablet, Rfl: 11    Melatonin 10 MG TABS, Take by mouth, Disp: , Rfl:     metoprolol tartrate (LOPRESSOR) 25 mg tablet, Take 0 5 tablets (12 5 mg total) by mouth every 12 (twelve) hours, Disp: 90 tablet, Rfl: 3    pantoprazole (PROTONIX) 40 mg tablet, Take 40 mg by mouth daily  , Disp: , Rfl:     potassium chloride (K-DUR,KLOR-CON) 20 mEq tablet, Take 1 tablet (20 mEq total) by mouth daily (Patient not taking: Reported on 3/19/2019), Disp: 30 tablet, Rfl: 0  No Known Allergies  Vitals:    03/19/19 1005   BP: 140/80   BP Location: Left arm   Patient Position: Sitting   Cuff Size: Adult   Pulse: 80   SpO2: 97%   Weight: 79 8 kg (176 lb)   Height: 5' 5" (1 651 m)       Labs:  No visits with results within 2 Month(s) from this visit     Latest known visit with results is:   Hospital Outpatient Visit on 11/15/2018   Component Date Value    Ventricular Rate 11/15/2018 70     Atrial Rate 11/15/2018 70     UT Interval 11/15/2018 172     QRSD Interval 11/15/2018 126     QT Interval 11/15/2018 460     QTC Interval 11/15/2018 496     P Axis 11/15/2018 68     QRS Axis 11/15/2018 33     T Wave Axis 11/15/2018 95      Imaging: No results found  Review of Systems:  Review of Systems   Constitutional: Negative for diaphoresis and fatigue  HENT: Negative for congestion and facial swelling  Eyes: Negative for photophobia and visual disturbance  Respiratory: Positive for shortness of breath  Negative for chest tightness  Cardiovascular: Positive for chest pain  Negative for palpitations and leg swelling  Gastrointestinal: Negative for abdominal pain and nausea  Endocrine: Negative for cold intolerance and heat intolerance  Musculoskeletal: Negative for arthralgias and myalgias  Skin: Negative for pallor and rash  Neurological: Negative for dizziness and tremors  Psychiatric/Behavioral: Negative for sleep disturbance  The patient is not nervous/anxious  Physical Exam:  Physical Exam   Constitutional: She is oriented to person, place, and time  She appears well-developed and well-nourished  HENT:   Head: Normocephalic and atraumatic  Eyes: Pupils are equal, round, and reactive to light  Conjunctivae and EOM are normal    Neck: Normal range of motion  Neck supple  No JVD present  No thyromegaly present  Cardiovascular: Normal rate, regular rhythm, S1 normal, S2 normal and intact distal pulses  Exam reveals no gallop and no friction rub  Murmur heard  Crescendo systolic murmur is present with a grade of 3/6  Pulses:       Carotid pulses are 2+ on the right side, and 2+ on the left side  Pulmonary/Chest: Effort normal and breath sounds normal  No respiratory distress  She has no wheezes  She has no rales  Abdominal: Soft   Bowel sounds are normal  She exhibits no distension  There is no tenderness  There is no rebound and no guarding  Musculoskeletal: Normal range of motion  She exhibits no edema  Neurological: She is alert and oriented to person, place, and time  She has normal reflexes  No cranial nerve deficit  Skin: Skin is warm and dry  Psychiatric: She has a normal mood and affect  Discussion/Summary:  1  Severe aortic stenosis status post TAVR:  Asymptomatic  Recent echocardiogram showed normally functioning bioprosthetic valve  Patient can probably stop Plavix  2  Chronic systolic/diastolic CHF  Blood pressure is reasonable, continue current medications      3   Complete heart block status post pacemaker implant  Normal pacemaker function     Follow-up in 6 months

## 2019-11-05 DIAGNOSIS — I10 ESSENTIAL HYPERTENSION: Chronic | ICD-10-CM

## 2019-11-05 DIAGNOSIS — Z95.2 S/P TAVR (TRANSCATHETER AORTIC VALVE REPLACEMENT): ICD-10-CM

## 2019-11-26 ENCOUNTER — OFFICE VISIT (OUTPATIENT)
Dept: LAB | Facility: HOSPITAL | Age: 54
End: 2019-11-26
Payer: COMMERCIAL

## 2019-11-26 ENCOUNTER — HOSPITAL ENCOUNTER (OUTPATIENT)
Dept: NON INVASIVE DIAGNOSTICS | Facility: HOSPITAL | Age: 54
Discharge: HOME | End: 2019-11-26
Payer: COMMERCIAL

## 2019-11-26 DIAGNOSIS — Z95.3 S/P TAVR (TRANSCATHETER AORTIC VALVE REPLACEMENT), BIOPROSTHETIC: ICD-10-CM

## 2019-11-26 DIAGNOSIS — Z95.2 S/P TAVR (TRANSCATHETER AORTIC VALVE REPLACEMENT): ICD-10-CM

## 2019-11-26 DIAGNOSIS — I35.0 AORTIC STENOSIS, SEVERE: ICD-10-CM

## 2019-11-26 DIAGNOSIS — I10 ESSENTIAL HYPERTENSION: Chronic | ICD-10-CM

## 2019-11-26 LAB
ATRIAL RATE: 70 BPM
P AXIS: 68 DEGREES
PR INTERVAL: 158 MS
QRS AXIS: 31 DEGREES
QRSD INTERVAL: 124 MS
QT INTERVAL: 484 MS
QTC INTERVAL: 522 MS
T WAVE AXIS: 148 DEGREES
VENTRICULAR RATE: 70 BPM

## 2019-11-26 PROCEDURE — 93010 ELECTROCARDIOGRAM REPORT: CPT | Performed by: INTERNAL MEDICINE

## 2019-11-26 PROCEDURE — 93306 TTE W/DOPPLER COMPLETE: CPT

## 2019-11-26 PROCEDURE — 93306 TTE W/DOPPLER COMPLETE: CPT | Performed by: INTERNAL MEDICINE

## 2019-11-26 PROCEDURE — 93005 ELECTROCARDIOGRAM TRACING: CPT

## 2019-11-27 RX ORDER — CLOPIDOGREL BISULFATE 75 MG/1
TABLET ORAL
Qty: 90 TABLET | Refills: 3 | OUTPATIENT
Start: 2019-11-27

## 2019-11-27 RX ORDER — LISINOPRIL 5 MG/1
TABLET ORAL
Qty: 30 TABLET | Refills: 11 | OUTPATIENT
Start: 2019-11-27

## 2019-12-04 RX ORDER — CLOPIDOGREL BISULFATE 75 MG/1
TABLET ORAL
Qty: 90 TABLET | Refills: 3 | OUTPATIENT
Start: 2019-12-04

## 2019-12-04 RX ORDER — LISINOPRIL 5 MG/1
TABLET ORAL
Qty: 30 TABLET | Refills: 11 | OUTPATIENT
Start: 2019-12-04

## 2019-12-05 DIAGNOSIS — I10 ESSENTIAL HYPERTENSION: Chronic | ICD-10-CM

## 2019-12-05 RX ORDER — LISINOPRIL 5 MG/1
TABLET ORAL
Qty: 30 TABLET | Refills: 0 | Status: SHIPPED | OUTPATIENT
Start: 2019-12-05 | End: 2021-02-15 | Stop reason: SDUPTHER

## 2020-06-08 ENCOUNTER — OFFICE VISIT (OUTPATIENT)
Dept: CARDIOLOGY CLINIC | Facility: CLINIC | Age: 55
End: 2020-06-08
Payer: COMMERCIAL

## 2020-06-08 VITALS
WEIGHT: 181 LBS | OXYGEN SATURATION: 98 % | BODY MASS INDEX: 30.16 KG/M2 | DIASTOLIC BLOOD PRESSURE: 76 MMHG | HEART RATE: 64 BPM | SYSTOLIC BLOOD PRESSURE: 118 MMHG | HEIGHT: 65 IN

## 2020-06-08 DIAGNOSIS — I10 ESSENTIAL HYPERTENSION: Chronic | ICD-10-CM

## 2020-06-08 DIAGNOSIS — R07.89 OTHER CHEST PAIN: Primary | ICD-10-CM

## 2020-06-08 DIAGNOSIS — Z95.3 S/P TAVR (TRANSCATHETER AORTIC VALVE REPLACEMENT), BIOPROSTHETIC: ICD-10-CM

## 2020-06-08 DIAGNOSIS — E78.2 MIXED HYPERLIPIDEMIA: ICD-10-CM

## 2020-06-08 DIAGNOSIS — I44.2 CHB (COMPLETE HEART BLOCK) (HCC): ICD-10-CM

## 2020-06-08 DIAGNOSIS — I35.0 NONRHEUMATIC AORTIC VALVE STENOSIS: ICD-10-CM

## 2020-06-08 DIAGNOSIS — Z98.890 S/P PERICARDIAL WINDOW CREATION: ICD-10-CM

## 2020-06-08 PROCEDURE — 99214 OFFICE O/P EST MOD 30 MIN: CPT | Performed by: INTERNAL MEDICINE

## 2020-06-08 PROCEDURE — 93000 ELECTROCARDIOGRAM COMPLETE: CPT | Performed by: INTERNAL MEDICINE

## 2020-06-10 ENCOUNTER — APPOINTMENT (OUTPATIENT)
Dept: LAB | Facility: CLINIC | Age: 55
End: 2020-06-10
Payer: COMMERCIAL

## 2020-06-10 DIAGNOSIS — I35.0 NONRHEUMATIC AORTIC VALVE STENOSIS: ICD-10-CM

## 2020-06-10 LAB
ANION GAP SERPL CALCULATED.3IONS-SCNC: 2 MMOL/L (ref 4–13)
BASOPHILS # BLD AUTO: 0.13 THOUSANDS/ΜL (ref 0–0.1)
BASOPHILS NFR BLD AUTO: 1 % (ref 0–1)
BUN SERPL-MCNC: 14 MG/DL (ref 5–25)
CALCIUM SERPL-MCNC: 9.3 MG/DL (ref 8.3–10.1)
CHLORIDE SERPL-SCNC: 104 MMOL/L (ref 100–108)
CHOLEST SERPL-MCNC: 170 MG/DL (ref 50–200)
CO2 SERPL-SCNC: 32 MMOL/L (ref 21–32)
CREAT SERPL-MCNC: 0.92 MG/DL (ref 0.6–1.3)
EOSINOPHIL # BLD AUTO: 0.84 THOUSAND/ΜL (ref 0–0.61)
EOSINOPHIL NFR BLD AUTO: 8 % (ref 0–6)
ERYTHROCYTE [DISTWIDTH] IN BLOOD BY AUTOMATED COUNT: 16.8 % (ref 11.6–15.1)
GFR SERPL CREATININE-BSD FRML MDRD: 72 ML/MIN/1.73SQ M
GLUCOSE P FAST SERPL-MCNC: 98 MG/DL (ref 65–99)
HCT VFR BLD AUTO: 45.6 % (ref 34.8–46.1)
HDLC SERPL-MCNC: 41 MG/DL
HGB BLD-MCNC: 13.9 G/DL (ref 11.5–15.4)
IMM GRANULOCYTES # BLD AUTO: 0.02 THOUSAND/UL (ref 0–0.2)
IMM GRANULOCYTES NFR BLD AUTO: 0 % (ref 0–2)
LDLC SERPL CALC-MCNC: 107 MG/DL (ref 0–100)
LYMPHOCYTES # BLD AUTO: 3.32 THOUSANDS/ΜL (ref 0.6–4.47)
LYMPHOCYTES NFR BLD AUTO: 33 % (ref 14–44)
MCH RBC QN AUTO: 26.4 PG (ref 26.8–34.3)
MCHC RBC AUTO-ENTMCNC: 30.5 G/DL (ref 31.4–37.4)
MCV RBC AUTO: 87 FL (ref 82–98)
MONOCYTES # BLD AUTO: 1.09 THOUSAND/ΜL (ref 0.17–1.22)
MONOCYTES NFR BLD AUTO: 11 % (ref 4–12)
NEUTROPHILS # BLD AUTO: 4.76 THOUSANDS/ΜL (ref 1.85–7.62)
NEUTS SEG NFR BLD AUTO: 47 % (ref 43–75)
NRBC BLD AUTO-RTO: 0 /100 WBCS
PLATELET # BLD AUTO: 495 THOUSANDS/UL (ref 149–390)
PMV BLD AUTO: 10.4 FL (ref 8.9–12.7)
POTASSIUM SERPL-SCNC: 4.1 MMOL/L (ref 3.5–5.3)
RBC # BLD AUTO: 5.27 MILLION/UL (ref 3.81–5.12)
SODIUM SERPL-SCNC: 138 MMOL/L (ref 136–145)
TRIGL SERPL-MCNC: 111 MG/DL
WBC # BLD AUTO: 10.16 THOUSAND/UL (ref 4.31–10.16)

## 2020-06-10 PROCEDURE — 85025 COMPLETE CBC W/AUTO DIFF WBC: CPT | Performed by: INTERNAL MEDICINE

## 2020-06-10 PROCEDURE — 80048 BASIC METABOLIC PNL TOTAL CA: CPT | Performed by: INTERNAL MEDICINE

## 2020-06-10 PROCEDURE — 80061 LIPID PANEL: CPT

## 2020-06-10 PROCEDURE — 36415 COLL VENOUS BLD VENIPUNCTURE: CPT | Performed by: INTERNAL MEDICINE

## 2020-06-11 ENCOUNTER — TELEPHONE (OUTPATIENT)
Dept: CARDIOLOGY CLINIC | Facility: CLINIC | Age: 55
End: 2020-06-11

## 2020-08-10 ENCOUNTER — HOSPITAL ENCOUNTER (OUTPATIENT)
Dept: NON INVASIVE DIAGNOSTICS | Facility: CLINIC | Age: 55
Discharge: HOME | End: 2020-08-10
Payer: COMMERCIAL

## 2020-08-10 DIAGNOSIS — Z95.3 S/P TAVR (TRANSCATHETER AORTIC VALVE REPLACEMENT), BIOPROSTHETIC: ICD-10-CM

## 2020-08-10 DIAGNOSIS — I35.0 NONRHEUMATIC AORTIC VALVE STENOSIS: ICD-10-CM

## 2020-08-10 DIAGNOSIS — R07.89 OTHER CHEST PAIN: ICD-10-CM

## 2020-08-10 PROCEDURE — 93321 DOPPLER ECHO F-UP/LMTD STD: CPT | Performed by: INTERNAL MEDICINE

## 2020-08-10 PROCEDURE — 93308 TTE F-UP OR LMTD: CPT | Performed by: INTERNAL MEDICINE

## 2020-08-10 PROCEDURE — 93308 TTE F-UP OR LMTD: CPT

## 2020-08-10 PROCEDURE — 93325 DOPPLER ECHO COLOR FLOW MAPG: CPT | Performed by: INTERNAL MEDICINE

## 2020-08-28 ENCOUNTER — OFFICE VISIT (OUTPATIENT)
Dept: GASTROENTEROLOGY | Facility: CLINIC | Age: 55
End: 2020-08-28
Payer: COMMERCIAL

## 2020-08-28 VITALS
RESPIRATION RATE: 18 BRPM | BODY MASS INDEX: 30.16 KG/M2 | DIASTOLIC BLOOD PRESSURE: 80 MMHG | HEIGHT: 65 IN | WEIGHT: 181 LBS | SYSTOLIC BLOOD PRESSURE: 122 MMHG | TEMPERATURE: 96.1 F

## 2020-08-28 DIAGNOSIS — K21.9 GASTROESOPHAGEAL REFLUX DISEASE, ESOPHAGITIS PRESENCE NOT SPECIFIED: Primary | ICD-10-CM

## 2020-08-28 DIAGNOSIS — R12 HEARTBURN: ICD-10-CM

## 2020-08-28 DIAGNOSIS — Z86.018: ICD-10-CM

## 2020-08-28 DIAGNOSIS — K31.7 GASTRIC POLYPS: ICD-10-CM

## 2020-08-28 PROCEDURE — 99204 OFFICE O/P NEW MOD 45 MIN: CPT | Performed by: INTERNAL MEDICINE

## 2020-08-28 NOTE — H&P (VIEW-ONLY)
[facility]      Chief Complaint:  Colon in stomach tumors    HPI:  Deyanira Bravo is a 48 y o   female who presents with a history going back about 10 years of a colon tumor  According to the patient she was seen at [facility]  She had been seen there for years because of Hodgkin's  The patient was diagnosed with a colon tumor  They told her she would likely have to have a colostomy but when she came out of the surgery they told her the tumor and turned out to be benign  She has no idea what the type of surgery she had was period she was going for yearly colonoscopy  She also was going for yearly EGDs because of what was described to her is a precancerous polyp in the stomach  The patient does have some reflux and heartburn issues but these are intermittent  She denies any abdominal pain  She has occasional nausea  She has no dysphagia or odynophagia  She denies any melena hematochezia or rectal bleeding  She has no recent change in bowel habits or stool caliber  She has no weight loss  She has no vomiting  The patient's last EGD and colonoscopy was about 4 years ago because she moved to [place]  She has no other significant GI complaints at the present time  She has extensive cardiac history with aortic valve repair  She has significant anxiety issues         Review of Systems:   Constitutional: No fever or chills, feels well, no tiredness, no recent weight gain or weight loss  HENT: No complaints of earache, no hearing loss, no nosebleeds, no nasal discharge, no sore throat, no hoarseness  Eyes: No complaints of eye pain, no red eyes, no discharge from eyes, no itchy eyes  Cardiovascular:  As per HPI   Respiratory: No complaints of shortness of breath, no wheezing, no cough, no SOB on exertion, no orthopnea  Gastrointestinal: As noted in HPI  Genitourinary: No complaints of dysuria, no incontinence, no hesitancy, no nocturia     Musculoskeletal: No complaints of arthralgia, no myalgias, no joint swelling or stiffness, no limb pain or swelling  Neurological: No complaints of headache, no confusion, no convulsions, no numbness or tingling, no dizziness or fainting, no limb weakness, no difficulty walking  Skin: No complaints of skin rash or skin lesions, no itching, no skin wound, no dry skin  Hematological/Lymphatic: No complaints of swollen glands, does not bleed easy  Allergic/Immunologic: No immunocompromised state  Endocrine:  No complaints of polyuria, no polydipsia  Psychiatric/Behavioral:  As per HPI      Historical Information   Past Medical History:   Diagnosis Date    Anxiety     CAD (coronary artery disease)     [facility]     Colon tumor     s/p resection    Hodgkin's disease ([facility] )     age 7, s/p radiation & chemotherapy    Hyperlipidemia     Hypertension     LBBB (left bundle branch block)     Severe aortic stenosis      Past Surgical History:   Procedure Laterality Date    CARDIAC CATHETERIZATION      COLON SURGERY      non cancerous tumor    NECK SURGERY      PERICARDIAL WINDOW      [place] ECHO TRANSESOPHAG R-T 2D W/PRB IMG ACQUISJ I&R N/A 10/16/2018    Procedure: TRANSESOPHAGEAL ECHOCARDIOGRAM (MICHAEL); Surgeon: Deedee Mccrary MD;  [facility];  Service: Cardiac Surgery    [place] REPLACE AORTIC VALVE OPENFEMORAL ARTERY APPROACH N/A 10/16/2018    Procedure: REPLACEMENT AORTIC VALVE TRANSCATHETER (TAVR) TRANSFEMORAL W/ 23 MM DE LA ROSA LEODAN S3 VALVE (ACCESS ON LEFT);   Surgeon: Deedee Mccrary MD;  [facility];  Service: Cardiac Surgery    REPLACEMENT AORTIC VALVE TRANSCATHETER (TAVR)      TOTAL ABDOMINAL HYSTERECTOMY      TUMOR REMOVAL       Social History   Social History     Substance and Sexual Activity   Alcohol Use No     Social History     Substance and Sexual Activity   Drug Use No     Social History     Tobacco Use   Smoking Status Never Smoker   Smokeless Tobacco Never Used Family History   Problem Relation Age of Onset    Hyperlipidemia Mother     Hypertension Mother     Diabetes Mother          Current Medications: has a current medication list which includes the following prescription(s): acetaminophen, aspirin, atorvastatin, b complex-c-folic acid, citalopram, clopidogrel, furosemide, lisinopril, melatonin, metoprolol tartrate, pantoprazole, and potassium chloride  Vital Signs: /80   Temp (!) 96 1 °F (35 6 °C)   Resp 18   Ht 5' 5" (1 651 m)   Wt 82 1 kg (181 lb)   BMI 30 12 kg/m²       Physical Exam:   Constitutional  General Appearance: No acute distress, well appearing and well nourished  Head  Normocephalic  Eyes  Conjunctivae and lids: No swelling, erythema, or discharge  Pupils and irises: Equal, round and reactive to light  Ears, Nose, Mouth, and Throat  External inspection of ears and nose: Normal  Nasal mucosa, septum and turbinates: Normal without edema or erythema/   Oropharynx: Normal with no erythema, edema, exudate or lesions  Neck  Normal range of motion  Neck supple  Cardiovascular  Auscultation of the heart: Normal rate and rhythm, normal S1 and S2 1/6 systolic ejection murmur left sternal border  Examination of the extremities for edema and/or varicosities: Normal  Pulmonary/Chest  Respiratory effort: No increased work of breathing or signs of respiratory distress  Auscultation of lungs: Clear to auscultation, equal breath sounds bilaterally, no wheezes, rales, no rhonchi  Abdomen  Abdomen: Non-tender, no masses  Midline scar  Liver and spleen: No hepatomegaly or splenomegaly  Musculoskeletal  Gait and station: normal   Digits and Nails: normal without clubbing or cyanosis  Inspection/palpation of joints, bones, and muscles: Normal  Neurological  No nystagmus or asterixis  Skin  Skin and subcutaneous tissue: Normal without rashes or lesions  Lymphatic  Palpation of the lymph nodes in neck: No lymphadenopathy  Psychiatric  Orientation to person, place and time: Normal   Mood and affect: Normal          Labs:  Lab Results   Component Value Date    ALT 26 10/11/2018    AST 20 10/11/2018    BUN 14 06/10/2020    CALCIUM 9 3 06/10/2020     06/10/2020    CO2 32 06/10/2020    CREATININE 0 92 06/10/2020    HDL 41 06/10/2020    HCT 45 6 06/10/2020    HGB 13 9 06/10/2020    HGBA1C 6 8 (H) 10/11/2018    MG 2 5 10/18/2018     (H) 06/10/2020    K 4 1 06/10/2020    TRIG 111 06/10/2020    WBC 10 16 06/10/2020         X-Rays & Procedures:   No orders to display           ______________________________________________________________________      Assessment & Plan:     Diagnoses and all orders for this visit:    Gastroesophageal reflux disease, esophagitis presence not specified    Heartburn    History of benign colon tumor    Gastric polyps      Patient will be scheduled for both EGD and colonoscopy  Further recommendations will depend on the study results  She will continue current medical management

## 2020-08-28 NOTE — PROGRESS NOTES
[facility]      Chief Complaint:  Colon in stomach tumors    HPI:  Rose Harrison is a 48 y o   female who presents with a history going back about 10 years of a colon tumor  According to the patient she was seen at [facility]  She had been seen there for years because of Hodgkin's  The patient was diagnosed with a colon tumor  They told her she would likely have to have a colostomy but when she came out of the surgery they told her the tumor and turned out to be benign  She has no idea what the type of surgery she had was period she was going for yearly colonoscopy  She also was going for yearly EGDs because of what was described to her is a precancerous polyp in the stomach  The patient does have some reflux and heartburn issues but these are intermittent  She denies any abdominal pain  She has occasional nausea  She has no dysphagia or odynophagia  She denies any melena hematochezia or rectal bleeding  She has no recent change in bowel habits or stool caliber  She has no weight loss  She has no vomiting  The patient's last EGD and colonoscopy was about 4 years ago because she moved to [place]  She has no other significant GI complaints at the present time  She has extensive cardiac history with aortic valve repair  She has significant anxiety issues         Review of Systems:   Constitutional: No fever or chills, feels well, no tiredness, no recent weight gain or weight loss  HENT: No complaints of earache, no hearing loss, no nosebleeds, no nasal discharge, no sore throat, no hoarseness  Eyes: No complaints of eye pain, no red eyes, no discharge from eyes, no itchy eyes  Cardiovascular:  As per HPI   Respiratory: No complaints of shortness of breath, no wheezing, no cough, no SOB on exertion, no orthopnea  Gastrointestinal: As noted in HPI  Genitourinary: No complaints of dysuria, no incontinence, no hesitancy, no nocturia     Musculoskeletal: No complaints of arthralgia, no myalgias, no joint swelling or stiffness, no limb pain or swelling  Neurological: No complaints of headache, no confusion, no convulsions, no numbness or tingling, no dizziness or fainting, no limb weakness, no difficulty walking  Skin: No complaints of skin rash or skin lesions, no itching, no skin wound, no dry skin  Hematological/Lymphatic: No complaints of swollen glands, does not bleed easy  Allergic/Immunologic: No immunocompromised state  Endocrine:  No complaints of polyuria, no polydipsia  Psychiatric/Behavioral:  As per HPI      Historical Information   Past Medical History:   Diagnosis Date    Anxiety     CAD (coronary artery disease)     [facility]     Colon tumor     s/p resection    Hodg[facility]'s disease ([facility] )     age 7, s/p radiation & chemotherapy    Hyperlipidemia     Hypertension     LBBB (left bundle branch block)     Severe aortic stenosis      Past Surgical History:   Procedure Laterality Date    CARDIAC CATHETERIZATION      COLON SURGERY      non cancerous tumor    NECK SURGERY      PERICARDIAL WINDOW      [place] ECHO TRANSESOPHAG R-T 2D W/PRB IMG ACQUISJ I&R N/A 10/16/2018    Procedure: TRANSESOPHAGEAL ECHOCARDIOGRAM (MICHAEL); Surgeon: Rosa Elena Shaw MD;  [facility];  Service: Cardiac Surgery    [place] REPLACE AORTIC VALVE OPENFEMORAL ARTERY APPROACH N/A 10/16/2018    Procedure: REPLACEMENT AORTIC VALVE TRANSCATHETER (TAVR) TRANSFEMORAL W/ 23 MM DE LA ROSA LEODAN S3 VALVE (ACCESS ON LEFT);   Surgeon: Rosa Elena Shaw MD;  [facility];  Service: Cardiac Surgery    REPLACEMENT AORTIC VALVE TRANSCATHETER (TAVR)      TOTAL ABDOMINAL HYSTERECTOMY      TUMOR REMOVAL       Social History   Social History     Substance and Sexual Activity   Alcohol Use No     Social History     Substance and Sexual Activity   Drug Use No     Social History     Tobacco Use   Smoking Status Never Smoker   Smokeless Tobacco Never Used Family History   Problem Relation Age of Onset    Hyperlipidemia Mother     Hypertension Mother     Diabetes Mother          Current Medications: has a current medication list which includes the following prescription(s): acetaminophen, aspirin, atorvastatin, b complex-c-folic acid, citalopram, clopidogrel, furosemide, lisinopril, melatonin, metoprolol tartrate, pantoprazole, and potassium chloride  Vital Signs: /80   Temp (!) 96 1 °F (35 6 °C)   Resp 18   Ht 5' 5" (1 651 m)   Wt 82 1 kg (181 lb)   BMI 30 12 kg/m²       Physical Exam:   Constitutional  General Appearance: No acute distress, well appearing and well nourished  Head  Normocephalic  Eyes  Conjunctivae and lids: No swelling, erythema, or discharge  Pupils and irises: Equal, round and reactive to light  Ears, Nose, Mouth, and Throat  External inspection of ears and nose: Normal  Nasal mucosa, septum and turbinates: Normal without edema or erythema/   Oropharynx: Normal with no erythema, edema, exudate or lesions  Neck  Normal range of motion  Neck supple  Cardiovascular  Auscultation of the heart: Normal rate and rhythm, normal S1 and S2 1/6 systolic ejection murmur left sternal border  Examination of the extremities for edema and/or varicosities: Normal  Pulmonary/Chest  Respiratory effort: No increased work of breathing or signs of respiratory distress  Auscultation of lungs: Clear to auscultation, equal breath sounds bilaterally, no wheezes, rales, no rhonchi  Abdomen  Abdomen: Non-tender, no masses  Midline scar  Liver and spleen: No hepatomegaly or splenomegaly  Musculoskeletal  Gait and station: normal   Digits and Nails: normal without clubbing or cyanosis  Inspection/palpation of joints, bones, and muscles: Normal  Neurological  No nystagmus or asterixis  Skin  Skin and subcutaneous tissue: Normal without rashes or lesions  Lymphatic  Palpation of the lymph nodes in neck: No lymphadenopathy  Psychiatric  Orientation to person, place and time: Normal   Mood and affect: Normal          Labs:  Lab Results   Component Value Date    ALT 26 10/11/2018    AST 20 10/11/2018    BUN 14 06/10/2020    CALCIUM 9 3 06/10/2020     06/10/2020    CO2 32 06/10/2020    CREATININE 0 92 06/10/2020    HDL 41 06/10/2020    HCT 45 6 06/10/2020    HGB 13 9 06/10/2020    HGBA1C 6 8 (H) 10/11/2018    MG 2 5 10/18/2018     (H) 06/10/2020    K 4 1 06/10/2020    TRIG 111 06/10/2020    WBC 10 16 06/10/2020         X-Rays & Procedures:   No orders to display           ______________________________________________________________________      Assessment & Plan:     Diagnoses and all orders for this visit:    Gastroesophageal reflux disease, esophagitis presence not specified    Heartburn    History of benign colon tumor    Gastric polyps      Patient will be scheduled for both EGD and colonoscopy  Further recommendations will depend on the study results  She will continue current medical management

## 2020-08-28 NOTE — LETTER
August 28, 2020     Jaison Antunez MD  [address] [facility]  [address] [facility]  [address] [address]    Patient: Lizbet Alston   YOB: 1965   Date of Visit: 8/28/2020       Dear Dr Shaun Oreilly: Thank you for referring Lizbet Alston to me for evaluation  Below are my notes for this consultation  If you have questions, please do not hesitate to call me  I look forward to following your patient along with you  Sincerely,        Shilpi Diamond MD        CC: No Recipients  Shilpi Diamond MD  8/28/2020  7:48 AM  Incomplete  [facility]      Chief Complaint:  Colon in stomach tumors    HPI:  Lizbet Alston is a 48 y o   female who presents with a history going back about 10 years of a colon tumor  According to the patient she was seen at [facility]  She had been seen there for years because of Hodgkin's  The patient was diagnosed with a colon tumor  They told her she would likely have to have a colostomy but when she came out of the surgery they told her the tumor and turned out to be benign  She has no idea what the type of surgery she had was period she was going for yearly colonoscopy  She also was going for yearly EGDs because of what was described to her is a precancerous polyp in the stomach  The patient does have some reflux and heartburn issues but these are intermittent  She denies any abdominal pain  She has occasional nausea  She has no dysphagia or odynophagia  She denies any melena hematochezia or rectal bleeding  She has no recent change in bowel habits or stool caliber  She has no weight loss  She has no vomiting  The patient's last EGD and colonoscopy was about 4 years ago because she moved to [place]  She has no other significant GI complaints at the present time  She has extensive cardiac history with aortic valve repair  She has significant anxiety issues         Review of Systems:   Constitutional: No fever or chills, feels well, no tiredness, no recent weight gain or weight loss  HENT: No complaints of earache, no hearing loss, no nosebleeds, no nasal discharge, no sore throat, no hoarseness  Eyes: No complaints of eye pain, no red eyes, no discharge from eyes, no itchy eyes  Cardiovascular:  As per HPI   Respiratory: No complaints of shortness of breath, no wheezing, no cough, no SOB on exertion, no orthopnea  Gastrointestinal: As noted in HPI  Genitourinary: No complaints of dysuria, no incontinence, no hesitancy, no nocturia  Musculoskeletal: No complaints of arthralgia, no myalgias, no joint swelling or stiffness, no limb pain or swelling  Neurological: No complaints of headache, no confusion, no convulsions, no numbness or tingling, no dizziness or fainting, no limb weakness, no difficulty walking  Skin: No complaints of skin rash or skin lesions, no itching, no skin wound, no dry skin  Hematological/Lymphatic: No complaints of swollen glands, does not bleed easy  Allergic/Immunologic: No immunocompromised state  Endocrine:  No complaints of polyuria, no polydipsia  Psychiatric/Behavioral:  As per HPI      Historical Information   Past Medical History:   Diagnosis Date    Anxiety     CAD (coronary artery disease)     [facility]     Colon tumor     s/p resection    [facility]'s [facility]     age 7, s/p radiation & chemotherapy    Hyperlipidemia     Hypertension     LBBB (left bundle branch block)     Severe aortic stenosis      Past Surgical History:   Procedure Laterality Date    CARDIAC CATHETERIZATION      COLON SURGERY      non cancerous tumor    NECK SURGERY      PERICARDIAL WINDOW      [place] ECHO TRANSESOPHAG R-T 2D W/PRB IMG MANJIT I&R N/A 10/16/2018    Procedure: TRANSESOPHAGEAL ECHOCARDIOGRAM (MICHAEL);   Surgeon: Usha Abbott MD;  [facility];  Service: Cardiac Surgery    [place] REPLACE AORTIC VALVE OPENFEMORAL ARTERY APPROACH N/A 10/16/2018    Procedure: REPLACEMENT AORTIC VALVE TRANSCATHETER (TAVR) TRANSFEMORAL W/ 23 MM DE LA ROSA LEODAN S3 VALVE (ACCESS ON LEFT); Surgeon: Rebecca Guan MD;  [facility];  Service: Cardiac Surgery    REPLACEMENT AORTIC VALVE TRANSCATHETER (TAVR)      TOTAL ABDOMINAL HYSTERECTOMY      TUMOR REMOVAL       Social History   Social History     Substance and Sexual Activity   Alcohol Use No     Social History     Substance and Sexual Activity   Drug Use No     Social History     Tobacco Use   Smoking Status Never Smoker   Smokeless Tobacco Never Used     Family History   Problem Relation Age of Onset    Hyperlipidemia Mother     Hypertension Mother     Diabetes Mother          Current Medications: has a current medication list which includes the following prescription(s): acetaminophen, aspirin, atorvastatin, b complex-c-folic acid, citalopram, clopidogrel, furosemide, lisinopril, melatonin, metoprolol tartrate, pantoprazole, and potassium chloride  Vital Signs: /80   Temp (!) 96 1 °F (35 6 °C)   Resp 18   Ht 5' 5" (1 651 m)   Wt 82 1 kg (181 lb)   BMI 30 12 kg/m²       Physical Exam:   Constitutional  General Appearance: No acute distress, well appearing and well nourished  Head  Normocephalic  Eyes  Conjunctivae and lids: No swelling, erythema, or discharge  Pupils and irises: Equal, round and reactive to light  Ears, Nose, Mouth, and Throat  External inspection of ears and nose: Normal  Nasal mucosa, septum and turbinates: Normal without edema or erythema/   Oropharynx: Normal with no erythema, edema, exudate or lesions  Neck  Normal range of motion  Neck supple  Cardiovascular  Auscultation of the heart: Normal rate and rhythm, normal S1 and S2 1/6 systolic ejection murmur left sternal border  Examination of the extremities for edema and/or varicosities: Normal  Pulmonary/Chest  Respiratory effort: No increased work of breathing or signs of respiratory distress     Auscultation of lungs: Clear to auscultation, equal breath sounds bilaterally, no wheezes, rales, no rhonchi  Abdomen  Abdomen: Non-tender, no masses  Midline scar  Liver and spleen: No hepatomegaly or splenomegaly  Musculoskeletal  Gait and station: normal   Digits and Nails: normal without clubbing or cyanosis  Inspection/palpation of joints, bones, and muscles: Normal  Neurological  No nystagmus or asterixis  Skin  Skin and subcutaneous tissue: Normal without rashes or lesions  Lymphatic  Palpation of the lymph nodes in neck: No lymphadenopathy  Psychiatric  Orientation to person, place and time: Normal   Mood and affect: Normal          Labs:  Lab Results   Component Value Date    ALT 26 10/11/2018    AST 20 10/11/2018    BUN 14 06/10/2020    CALCIUM 9 3 06/10/2020     06/10/2020    CO2 32 06/10/2020    CREATININE 0 92 06/10/2020    HDL 41 06/10/2020    HCT 45 6 06/10/2020    HGB 13 9 06/10/2020    HGBA1C 6 8 (H) 10/11/2018    MG 2 5 10/18/2018     (H) 06/10/2020    K 4 1 06/10/2020    TRIG 111 06/10/2020    WBC 10 16 06/10/2020         X-Rays & Procedures:   No orders to display           ______________________________________________________________________      Assessment & Plan:     Diagnoses and all orders for this visit:    Gastroesophageal reflux disease, esophagitis presence not specified    Heartburn    History of benign colon tumor    Gastric polyps      Patient will be scheduled for both EGD and colonoscopy  Further recommendations will depend on the study results  She will continue current medical management

## 2020-09-17 ENCOUNTER — TELEPHONE (OUTPATIENT)
Dept: GASTROENTEROLOGY | Facility: CLINIC | Age: 55
End: 2020-09-17

## 2020-09-24 ENCOUNTER — ANESTHESIA EVENT (OUTPATIENT)
Dept: GASTROENTEROLOGY | Facility: HOSPITAL | Age: 55
End: 2020-09-24

## 2020-09-24 ENCOUNTER — HOSPITAL ENCOUNTER (OUTPATIENT)
Dept: GASTROENTEROLOGY | Facility: HOSPITAL | Age: 55
Setting detail: OUTPATIENT SURGERY
Discharge: HOME | End: 2020-09-24
Attending: INTERNAL MEDICINE | Admitting: INTERNAL MEDICINE
Payer: COMMERCIAL

## 2020-09-24 ENCOUNTER — ANESTHESIA (OUTPATIENT)
Dept: GASTROENTEROLOGY | Facility: HOSPITAL | Age: 55
End: 2020-09-24

## 2020-09-24 VITALS
RESPIRATION RATE: 16 BRPM | BODY MASS INDEX: 30.86 KG/M2 | WEIGHT: 180.78 LBS | OXYGEN SATURATION: 98 % | DIASTOLIC BLOOD PRESSURE: 80 MMHG | SYSTOLIC BLOOD PRESSURE: 127 MMHG | HEIGHT: 64 IN | TEMPERATURE: 97.7 F | HEART RATE: 80 BPM

## 2020-09-24 VITALS — HEART RATE: 79 BPM

## 2020-09-24 DIAGNOSIS — Z86.018: ICD-10-CM

## 2020-09-24 DIAGNOSIS — R12 HEARTBURN: ICD-10-CM

## 2020-09-24 DIAGNOSIS — K31.7 GASTRIC POLYPS: ICD-10-CM

## 2020-09-24 DIAGNOSIS — K21.9 GASTROESOPHAGEAL REFLUX DISEASE, ESOPHAGITIS PRESENCE NOT SPECIFIED: ICD-10-CM

## 2020-09-24 PROCEDURE — NC001 PR NO CHARGE: Performed by: INTERNAL MEDICINE

## 2020-09-24 PROCEDURE — 45380 COLONOSCOPY AND BIOPSY: CPT | Performed by: INTERNAL MEDICINE

## 2020-09-24 PROCEDURE — 88305 TISSUE EXAM BY PATHOLOGIST: CPT | Performed by: PATHOLOGY

## 2020-09-24 PROCEDURE — 45385 COLONOSCOPY W/LESION REMOVAL: CPT | Performed by: INTERNAL MEDICINE

## 2020-09-24 PROCEDURE — 43239 EGD BIOPSY SINGLE/MULTIPLE: CPT | Performed by: INTERNAL MEDICINE

## 2020-09-24 RX ORDER — SODIUM CHLORIDE, SODIUM LACTATE, POTASSIUM CHLORIDE, CALCIUM CHLORIDE 600; 310; 30; 20 MG/100ML; MG/100ML; MG/100ML; MG/100ML
INJECTION, SOLUTION INTRAVENOUS CONTINUOUS PRN
Status: DISCONTINUED | OUTPATIENT
Start: 2020-09-24 | End: 2020-09-24

## 2020-09-24 RX ORDER — SODIUM CHLORIDE, SODIUM LACTATE, POTASSIUM CHLORIDE, CALCIUM CHLORIDE 600; 310; 30; 20 MG/100ML; MG/100ML; MG/100ML; MG/100ML
100 INJECTION, SOLUTION INTRAVENOUS CONTINUOUS
Status: CANCELLED | OUTPATIENT
Start: 2020-09-24

## 2020-09-24 RX ORDER — PROPOFOL 10 MG/ML
INJECTION, EMULSION INTRAVENOUS AS NEEDED
Status: DISCONTINUED | OUTPATIENT
Start: 2020-09-24 | End: 2020-09-24

## 2020-09-24 RX ORDER — LIDOCAINE HYDROCHLORIDE 10 MG/ML
INJECTION, SOLUTION EPIDURAL; INFILTRATION; INTRACAUDAL; PERINEURAL AS NEEDED
Status: DISCONTINUED | OUTPATIENT
Start: 2020-09-24 | End: 2020-09-24

## 2020-09-24 RX ADMIN — PROPOFOL 50 MG: 10 INJECTION, EMULSION INTRAVENOUS at 07:48

## 2020-09-24 RX ADMIN — PROPOFOL 100 MG: 10 INJECTION, EMULSION INTRAVENOUS at 07:42

## 2020-09-24 RX ADMIN — PROPOFOL 50 MG: 10 INJECTION, EMULSION INTRAVENOUS at 07:56

## 2020-09-24 RX ADMIN — LIDOCAINE HYDROCHLORIDE 50 MG: 10 INJECTION, SOLUTION EPIDURAL; INFILTRATION; INTRACAUDAL; PERINEURAL at 07:42

## 2020-09-24 RX ADMIN — PHENYLEPHRINE HYDROCHLORIDE 100 MCG: 10 INJECTION INTRAVENOUS at 07:56

## 2020-09-24 RX ADMIN — PROPOFOL 50 MG: 10 INJECTION, EMULSION INTRAVENOUS at 07:44

## 2020-09-24 RX ADMIN — SODIUM CHLORIDE, SODIUM LACTATE, POTASSIUM CHLORIDE, AND CALCIUM CHLORIDE: .6; .31; .03; .02 INJECTION, SOLUTION INTRAVENOUS at 07:41

## 2020-09-24 RX ADMIN — PHENYLEPHRINE HYDROCHLORIDE 100 MCG: 10 INJECTION INTRAVENOUS at 07:52

## 2020-09-24 RX ADMIN — PROPOFOL 50 MG: 10 INJECTION, EMULSION INTRAVENOUS at 07:52

## 2020-09-24 NOTE — ANESTHESIA PREPROCEDURE EVALUATION
Medical History     History  Comments    CHF (congestive heart failure) (HCC)     Hypertension     Anxiety     Hyperlipidemia     Hodgkin's disease ([facility] )  age 7, s/p radiation & chemotherapy    CAD (coronary artery disease)     Severe aortic stenosis  S/p AVR   LBBB (left bundle branch block)  S/p PPM   Colon tumor  s/p resection      Procedure:  COLONOSCOPY  EGD    Relevant Problems   ANESTHESIA (within normal limits)      CARDIO   (+) Aortic stenosis, severe   (+) CAD (coronary artery disease)   (+) CHB (complete heart block) (HCC)   (+) CHF (congestive heart failure) (HCC)   (+) Coronary artery disease involving native coronary artery of native heart   (+) HLD (hyperlipidemia)   (+) Hyperlipidemia   (+) Hypertension   (+) Moderate to severe aortic stenosis   (+) S/p TAVR (transcatheter aortic valve replacement), bioprosthetic      NEURO/PSYCH   (+) Anxiety   (+) History of Hodgkin's lymphoma        Physical Exam    Airway    Mallampati score: II  TM Distance: >3 FB  Neck ROM: full     Dental   No notable dental hx     Cardiovascular      Pulmonary      Other Findings        Anesthesia Plan  ASA Score- 3     Anesthesia Type- IV sedation with anesthesia with ASA Monitors  Additional Monitors:   Airway Plan:     Comment: Per patient, appropriately NPO, denies active CP/SOB/wheezing/symptoms related to heartburn/nausea/vomiting  Plan Factors-Exercise tolerance (METS): >4 METS  Chart reviewed  Patient summary reviewed  Patient is not a current smoker  Induction- intravenous  Postoperative Plan-     Informed Consent- Anesthetic plan and risks discussed with patient  I personally reviewed this patient with the CRNA  Discussed and agreed on the Anesthesia Plan with the CRNA  Judy Ayon

## 2020-09-24 NOTE — ANESTHESIA POSTPROCEDURE EVALUATION
Post-Op Assessment Note    CV Status:  Stable    Pain management: adequate     Mental Status:  Alert and awake   Hydration Status:  Euvolemic   PONV Controlled:  Controlled   Airway Patency:  Patent      Post Op Vitals Reviewed: Yes      Staff: CRNA         No complications documented      BP   107/57   Temp      Pulse  77   Resp   18   SpO2   92

## 2020-09-24 NOTE — DISCHARGE INSTRUCTIONS

## 2020-09-24 NOTE — INTERVAL H&P NOTE
H&P reviewed  After examining the patient I find no changes in the patients condition since the H&P had been written      Vitals:    09/24/20 0654   BP: 130/60   Pulse: 83   Resp: 18   SpO2: 99%

## 2020-10-21 ENCOUNTER — TELEPHONE (OUTPATIENT)
Dept: GASTROENTEROLOGY | Facility: CLINIC | Age: 55
End: 2020-10-21

## 2020-12-29 ENCOUNTER — TELEPHONE (OUTPATIENT)
Dept: CARDIOLOGY CLINIC | Facility: CLINIC | Age: 55
End: 2020-12-29

## 2021-01-12 ENCOUNTER — TELEPHONE (OUTPATIENT)
Dept: CARDIOLOGY CLINIC | Facility: CLINIC | Age: 56
End: 2021-01-12

## 2021-01-12 ENCOUNTER — OFFICE VISIT (OUTPATIENT)
Dept: CARDIOLOGY CLINIC | Facility: CLINIC | Age: 56
End: 2021-01-12
Payer: COMMERCIAL

## 2021-01-12 VITALS
BODY MASS INDEX: 31.1 KG/M2 | SYSTOLIC BLOOD PRESSURE: 120 MMHG | HEART RATE: 83 BPM | WEIGHT: 182.2 LBS | DIASTOLIC BLOOD PRESSURE: 74 MMHG | OXYGEN SATURATION: 97 % | HEIGHT: 64 IN

## 2021-01-12 DIAGNOSIS — Z95.0 HISTORY OF PERMANENT CARDIAC PACEMAKER PLACEMENT: ICD-10-CM

## 2021-01-12 DIAGNOSIS — I50.32 CHRONIC DIASTOLIC HEART FAILURE (HCC): ICD-10-CM

## 2021-01-12 DIAGNOSIS — E78.2 MIXED HYPERLIPIDEMIA: ICD-10-CM

## 2021-01-12 DIAGNOSIS — Z95.2 S/P TAVR (TRANSCATHETER AORTIC VALVE REPLACEMENT): Primary | ICD-10-CM

## 2021-01-12 DIAGNOSIS — I10 ESSENTIAL HYPERTENSION: ICD-10-CM

## 2021-01-12 PROCEDURE — 99213 OFFICE O/P EST LOW 20 MIN: CPT | Performed by: INTERNAL MEDICINE

## 2021-01-12 RX ORDER — AMLODIPINE BESYLATE 10 MG/1
10 TABLET ORAL
COMMUNITY
Start: 2020-09-17 | End: 2021-02-15 | Stop reason: SDUPTHER

## 2021-01-12 RX ORDER — ZOLPIDEM TARTRATE 10 MG/1
10 TABLET ORAL DAILY
COMMUNITY
Start: 2020-10-29

## 2021-01-12 NOTE — PROGRESS NOTES
[facility]  [id number] [facility] [place] [address]  Cardiology Follow Up    Mikaela Izaguirre  1965  [id number]      1  S/P TAVR (transcatheter aortic valve replacement)     2  Chronic diastolic heart failure ([facility] [address] )     3  Essential hypertension  Comprehensive metabolic panel    Lipid Panel with Direct LDL reflex   4  Mixed hyperlipidemia     5  History of permanent cardiac pacemaker placement         Chief Complaint   Patient presents with    Follow-up     Swelling in legs       Interval History:   81-year-old female with a nonobstructive coronaries, diastolic heart failure, severe aortic stenosis status post TAVR in 2018, status post pacemaker implantation for complete heart block after TAVR, hypertension, hyperlipidemia, history of pericardial window presented for cardiology continue to of care    Patient is doing okay since last seen in Cardiology Clinic  Her atypical chest pain present on last clinic visit has resolved  She has occasionally felt palpitation lasting few seconds with self resolution and without any associated symptoms  She denies chest pain, shortness of breath, dizziness, orthopnea, paroxysmal nocturnal dyspnea or loss of consciousness  She is getting intermittent leg swelling and she is taking Lasix daily lately(she used to take intermittent Lasix before)    Labs from June 2020 reviewed and no recent labs available for review  Current medications reviewed with the patient  Home blood pressure is controlled    Review of Systems:   All review of system negative except as mentioned above    Patient Active Problem List   Diagnosis    CHF (congestive heart failure) ([facility] [address] )    Hypertension    Moderate to severe aortic stenosis    Aortic stenosis, severe    Coronary artery disease involving native coronary artery of native heart    HLD (hyperlipidemia)    History of Hodgkin's lymphoma    Anxiety    S/P pericardial window creation    S/p TAVR (transcatheter aortic valve replacement), bioprosthetic    CAD (coronary artery disease)    Hyperlipidemia    CHB (complete heart block) (HCC)    Presence of permanent cardiac pacemaker    Encounter for postoperative care     Past Medical History:   Diagnosis Date    Anxiety     CAD (coronary artery disease)     CHF (congestive heart failure) (HCC)     Colon tumor     s/p resection    Hodgkin's disease ([facility] [address] )     age 7, s/p radiation & chemotherapy    Hyperlipidemia     Hypertension     LBBB (left bundle branch block)     Severe aortic stenosis      Social History     Socioeconomic History    Marital status: /Civil Union     Spouse name: Not on file    Number of children: Not on file    Years of education: Not on file    Highest education level: Not on file   Occupational History    Not on file   Social Needs    Financial resource strain: Not on file    Food insecurity     Worry: Not on file     Inability: Not on file   [employer] needs     Medical: Not on file     Non-medical: Not on file   Tobacco Use    Smoking status: Never Smoker    Smokeless tobacco: Never Used   Substance and Sexual Activity    Alcohol use: No    Drug use: No    Sexual activity: Never     Birth control/protection: Post-menopausal, Female Sterilization   Lifestyle    Physical activity     Days per week: Not on file     Minutes per session: Not on file    Stress: Not on file   Relationships    Social connections     Talks on phone: Not on file     Gets together: Not on file     Attends Worship service: Not on file     Active member of club or organization: Not on file     Attends meetings of clubs or organizations: Not on file     Relationship status: Not on file    Intimate partner violence     Fear of current or ex partner: Not on file     Emotionally abused: Not on file     Physically abused: Not on file     Forced sexual activity: Not on file   Other Topics Concern    Not on file   Social History Narrative    Not on file      Family History   Problem Relation Age of Onset    Hyperlipidemia Mother     Hypertension Mother     Diabetes Mother      Past Surgical History:   Procedure Laterality Date    CARDIAC CATHETERIZATION      COLON SURGERY      non cancerous tumor    NECK SURGERY      PERICARDIAL WINDOW      IA ECHO TRANSESOPHAG R-T 2D W/PRB IMG [id number] I&R N/A 10/16/2018    Procedure: TRANSESOPHAGEAL ECHOCARDIOGRAM (MICHAEL); Surgeon: Kevin Carlos MD;  [facility];  Service: Cardiac Surgery    IA REPLACE AORTIC VALVE OPENFEMORAL ARTERY APPROACH N/A 10/16/2018    Procedure: REPLACEMENT AORTIC VALVE TRANSCATHETER (TAVR) TRANSFEMORAL W/ 23 MM DE LA ROSA LEODAN S3 VALVE (ACCESS ON LEFT);   Surgeon: Kevin Carlos MD;  [facility];  Service: Cardiac Surgery    REPLACEMENT AORTIC VALVE TRANSCATHETER (TAVR)      TOTAL ABDOMINAL HYSTERECTOMY      TUMOR REMOVAL         Current Outpatient Medications:     acetaminophen (TYLENOL) 325 mg tablet, 650 mg every 4 to 6 hours as needed for pain, Disp: 30 tablet, Rfl: 0    amLODIPine (NORVASC) 10 mg tablet, Take 10 mg by mouth, Disp: , Rfl:     aspirin (ECOTRIN LOW STRENGTH) 81 mg EC tablet, Take 81 mg by mouth daily, Disp: , Rfl:     atorvastatin (LIPITOR) 80 mg tablet, Take 1 tablet (80 mg total) by mouth daily, Disp: 90 tablet, Rfl: 3    b complex-C-folic acid (NEPHRO-ESTER) 0 8 mg tablet, Take 0 8 mg by mouth daily with dinner, Disp: , Rfl:     citalopram (CeleXA) 40 mg tablet, Take 40 mg by mouth daily  , Disp: , Rfl:     furosemide (LASIX) 20 mg tablet, Take 1 tablet (20 mg total) by mouth daily, Disp: 30 tablet, Rfl: 11    lisinopril (ZESTRIL) 5 mg tablet, take 1 tablet by mouth once daily, Disp: 30 tablet, Rfl: 0    metoprolol tartrate (LOPRESSOR) 25 mg tablet, Take 0 5 tablets (12 5 mg total) by mouth every 12 (twelve) hours (Patient taking differently: Take 12 5 mg by mouth daily ), Disp: 90 tablet, Rfl: 3    pantoprazole (PROTONIX) 40 mg tablet, Take 40 mg by mouth daily  , Disp: , Rfl:     potassium chloride (K-DUR,KLOR-CON) 20 mEq tablet, Take 1 tablet (20 mEq total) by mouth daily, Disp: 30 tablet, Rfl: 0    zolpidem (AMBIEN) 10 mg tablet, Take 10 mg by mouth daily, Disp: , Rfl:   No Known Allergies    Labs:  Hospital Outpatient Visit on 09/24/2020   Component Date Value    Case Report 09/24/2020                      Value:Surgical Pathology Report                         Case: [id number]                                   Authorizing Provider:  Presley Butler MD      Collected:           09/24/2020 0748              Ordering Location:      [facility]       Received:            09/24/2020 [address] [facility]                                                             Pathologist:           Jose Chong MD                                                         Specimens:   A) - Polyp, Stomach/Small Intestine, body                                                           B) - Esophagus, distal                                                                              C) - Esophagus, proximal                                                                            D) - Polyp, Colorectal, descending                                                                  E) - Polyp, Colorectal, recto sigmoid                                                      Final Diagnosis 09/24/2020                      Value: This result contains rich text formatting which cannot be displayed here   Additional Information 09/24/2020                      Value: This result contains rich text formatting which cannot be displayed here  Brenda Driscoll Gross Description 09/24/2020                      Value: This result contains rich text formatting which cannot be displayed here   Clinical Information 09/24/2020                      Value:Cold bx polypectomy     Imaging: No results found      Physical Exam:  General: Obese, awake, alert and oriented x3, not in distress  Neck: supple, no JVD  Eyes: PERRL, conjunctiva normal  Lungs:  Bilateral air entry positive, no wheeze/rhonchi or crackle  Heart:  S1-S2 normal, no murmur  Abdomen:  Soft ,nondistended ,nontender, bowel sounds positive  Extremities:  No leg edema, no deformity, ROM normal  Neuro:  Moving all extremities, speech clear  Skin: warm, no rash    /74 (BP Location: Left arm, Patient Position: Sitting, Cuff Size: Standard)   Pulse 83   Ht 5' 4" (1 626 m)   Wt 82 6 kg (182 lb 3 2 oz)   SpO2 97%   BMI 31 27 kg/m²     Cardiographics :  Limited echocardiogram in August 2020 showed EF 60% without regional wall motion abnormality, moderate mitral annular calcification, mild mitral stenosis, mean transmitral gradient 4 mm mercury, bioprosthetic aortic wall functioning normally with mean gradient 17(no significant change in gradient as compared to prior echo), mild TR, trace PI    Previous EKG, sinus rhythm, possible left atrial enlargement, left bundle-branch block    Assessment:     1  Chest pain  Resolved     2  Severe aortic stenosis status post TAVR in 2018  Asymptomatic  Recent echo in November of 2019 showed normal functioning TAVR valve     3  Chronic diastolic heart failure    Compensated at present time       4  Complete heart block post TAVR status post permanent pacemaker  no interrogation done more than 6 months    5  Hypertension   6  Hyperlipidemia        Recommendations:  Patient is doing okay from cardiology standpoint at present time  Patient to continue current meds- aspirin, statin, metoprolol tartrate, Lasix and lisinopril  Patient was advised to continue taking 20 mg Lasix on a daily basis to prevent leg edema     BMP and lipid panel ordered  Advised to take low-salt, low-fat/low-cholesterol diet  Patient will be enrolled in device Clinic  Return to clinic in 6 months or early as needed   Above all discussed with patient    Patient understands and agrees

## 2021-01-13 NOTE — TELEPHONE ENCOUNTER
Called pt to have her send a transmission  She did not have her device plugged in  Advised her to charge it for 30 mins and call us back for assistance

## 2021-01-18 ENCOUNTER — REMOTE DEVICE CLINIC VISIT (OUTPATIENT)
Dept: CARDIOLOGY CLINIC | Facility: CLINIC | Age: 56
End: 2021-01-18
Payer: COMMERCIAL

## 2021-01-18 DIAGNOSIS — E87.6 HYPOKALEMIA: Primary | ICD-10-CM

## 2021-01-18 DIAGNOSIS — Z95.0 CARDIAC PACEMAKER IN SITU: Primary | ICD-10-CM

## 2021-01-18 LAB
ALBUMIN SERPL-MCNC: 4.4 G/DL (ref 3.8–4.9)
ALBUMIN/GLOB SERPL: 1.3 {RATIO} (ref 1.2–2.2)
ALP SERPL-CCNC: 125 IU/L (ref 39–117)
ALT SERPL-CCNC: 12 IU/L (ref 0–32)
AST SERPL-CCNC: 21 IU/L (ref 0–40)
BILIRUB SERPL-MCNC: 0.4 MG/DL (ref 0–1.2)
BUN SERPL-MCNC: 9 MG/DL (ref 6–24)
BUN/CREAT SERPL: 11 (ref 9–23)
CALCIUM SERPL-MCNC: 8.6 MG/DL (ref 8.7–10.2)
CHLORIDE SERPL-SCNC: 98 MMOL/L (ref 96–106)
CHOLEST SERPL-MCNC: 149 MG/DL (ref 100–199)
CO2 SERPL-SCNC: 27 MMOL/L (ref 20–29)
CREAT SERPL-MCNC: 0.8 MG/DL (ref 0.57–1)
GLOBULIN SER-MCNC: 3.5 G/DL (ref 1.5–4.5)
GLUCOSE SERPL-MCNC: 115 MG/DL (ref 65–99)
HDLC SERPL-MCNC: 44 MG/DL
LDLC SERPL CALC-MCNC: 85 MG/DL (ref 0–99)
POTASSIUM SERPL-SCNC: 3.4 MMOL/L (ref 3.5–5.2)
PROT SERPL-MCNC: 7.9 G/DL (ref 6–8.5)
SL AMB EGFR AFRICAN AMERICAN: 97 ML/MIN/1.73
SL AMB EGFR NON AFRICAN AMERICAN: 84 ML/MIN/1.73
SL AMB VLDL CHOLESTEROL CALC: 20 MG/DL (ref 5–40)
SODIUM SERPL-SCNC: 142 MMOL/L (ref 134–144)
TRIGL SERPL-MCNC: 112 MG/DL (ref 0–149)

## 2021-01-18 PROCEDURE — 93296 REM INTERROG EVL PM/IDS: CPT | Performed by: INTERNAL MEDICINE

## 2021-01-18 PROCEDURE — 93294 REM INTERROG EVL PM/LDLS PM: CPT | Performed by: INTERNAL MEDICINE

## 2021-01-18 RX ORDER — POTASSIUM CHLORIDE 20 MEQ/1
20 TABLET, EXTENDED RELEASE ORAL DAILY
Qty: 90 TABLET | Refills: 3 | Status: SHIPPED | OUTPATIENT
Start: 2021-01-18 | End: 2021-02-15 | Stop reason: SDUPTHER

## 2021-01-18 NOTE — PROGRESS NOTES
Results for orders placed or performed in visit on 01/18/21   Cardiac EP device report    Narrative    MDT DUAL CHAMBER PM  CARELINK TRANSMISSION: PT TRANSFERRED BACK TO [facility] FROM [facility]  BATTERY VOLTAGE ADEQUATE (13 1 YRS)  AP<0 1%, <0 1%  ALL AVAILABLE LEAD PARAMETERS WITHIN NORMAL LIMITS  1 NSVT EPISODE ON 9/23/20 FOR 13 BEATS, AVG CL~400MS  EF-60% (ECHO 8/10/20)  PT ON ASA 81MG & METOPROLOL  NORMAL DEVICE FUNCTION   GV

## 2021-02-15 DIAGNOSIS — I50.42 CHRONIC COMBINED SYSTOLIC AND DIASTOLIC CONGESTIVE HEART FAILURE (HCC): Chronic | ICD-10-CM

## 2021-02-15 DIAGNOSIS — I35.0 AORTIC STENOSIS, MODERATE: ICD-10-CM

## 2021-02-15 DIAGNOSIS — E87.6 HYPOKALEMIA: ICD-10-CM

## 2021-02-15 DIAGNOSIS — I10 ESSENTIAL HYPERTENSION: Chronic | ICD-10-CM

## 2021-02-15 RX ORDER — FUROSEMIDE 20 MG/1
20 TABLET ORAL DAILY
Qty: 90 TABLET | Refills: 2 | Status: SHIPPED | OUTPATIENT
Start: 2021-02-15 | End: 2021-08-17

## 2021-02-15 RX ORDER — PANTOPRAZOLE SODIUM 40 MG/1
40 TABLET, DELAYED RELEASE ORAL DAILY
Qty: 90 TABLET | Refills: 2 | Status: SHIPPED | OUTPATIENT
Start: 2021-02-15

## 2021-02-15 RX ORDER — LISINOPRIL 5 MG/1
5 TABLET ORAL DAILY
Qty: 90 TABLET | Refills: 2 | Status: SHIPPED | OUTPATIENT
Start: 2021-02-15 | End: 2022-01-27

## 2021-02-15 RX ORDER — AMLODIPINE BESYLATE 10 MG/1
10 TABLET ORAL DAILY
Qty: 90 TABLET | Refills: 2 | Status: SHIPPED | OUTPATIENT
Start: 2021-02-15 | End: 2022-01-27

## 2021-02-15 RX ORDER — POTASSIUM CHLORIDE 20 MEQ/1
20 TABLET, EXTENDED RELEASE ORAL DAILY
Qty: 90 TABLET | Refills: 2 | Status: SHIPPED | OUTPATIENT
Start: 2021-02-15

## 2021-02-15 RX ORDER — ATORVASTATIN CALCIUM 80 MG/1
80 TABLET, FILM COATED ORAL DAILY
Qty: 90 TABLET | Refills: 2 | Status: SHIPPED | OUTPATIENT
Start: 2021-02-15

## 2021-05-20 ENCOUNTER — IN-CLINIC DEVICE VISIT (OUTPATIENT)
Dept: CARDIOLOGY CLINIC | Facility: CLINIC | Age: 56
End: 2021-05-20
Payer: COMMERCIAL

## 2021-05-20 DIAGNOSIS — Z95.0 PRESENCE OF PERMANENT CARDIAC PACEMAKER: Primary | ICD-10-CM

## 2021-05-20 PROCEDURE — 93280 PM DEVICE PROGR EVAL DUAL: CPT | Performed by: INTERNAL MEDICINE

## 2021-05-20 NOTE — PROGRESS NOTES
MDT DUAL CHAMBER PM/ ACTIVE SYSTEM IS MRI CONDITIONAL   DEVICE INTERROGATED IN THE [place] OFFICE:  BATTERY VOLTAGE ADEQUATE (12 7 YR)    ALL LEAD PARAMETERS WITHIN NORMAL LIMITS   NO NEW HIGH RATE EPISODES   LISTED EPISODES PREVIOUSLY REPORTED   NO PROGRAMMING CHANGES MADE TO DEVICE PARAMETERS   NORMAL DEVICE FUNCTION  Samuel Ayala

## 2021-08-17 ENCOUNTER — OFFICE VISIT (OUTPATIENT)
Dept: CARDIOLOGY CLINIC | Facility: CLINIC | Age: 56
End: 2021-08-17
Payer: COMMERCIAL

## 2021-08-17 VITALS
HEIGHT: 64 IN | HEART RATE: 83 BPM | OXYGEN SATURATION: 96 % | WEIGHT: 186 LBS | BODY MASS INDEX: 31.76 KG/M2 | SYSTOLIC BLOOD PRESSURE: 136 MMHG | DIASTOLIC BLOOD PRESSURE: 82 MMHG

## 2021-08-17 DIAGNOSIS — I35.0 NONRHEUMATIC AORTIC VALVE STENOSIS: Primary | ICD-10-CM

## 2021-08-17 DIAGNOSIS — I50.32 CHRONIC DIASTOLIC HEART FAILURE (HCC): ICD-10-CM

## 2021-08-17 DIAGNOSIS — Z95.2 S/P TAVR (TRANSCATHETER AORTIC VALVE REPLACEMENT): ICD-10-CM

## 2021-08-17 DIAGNOSIS — E78.2 MIXED HYPERLIPIDEMIA: ICD-10-CM

## 2021-08-17 DIAGNOSIS — I10 ESSENTIAL HYPERTENSION: ICD-10-CM

## 2021-08-17 DIAGNOSIS — Z95.0 PRESENCE OF PERMANENT CARDIAC PACEMAKER: ICD-10-CM

## 2021-08-17 PROCEDURE — 99214 OFFICE O/P EST MOD 30 MIN: CPT | Performed by: INTERNAL MEDICINE

## 2021-08-17 RX ORDER — VALACYCLOVIR HYDROCHLORIDE 500 MG/1
500 TABLET, FILM COATED ORAL 2 TIMES DAILY
COMMUNITY
Start: 2021-07-29

## 2021-08-17 RX ORDER — CLOPIDOGREL BISULFATE 75 MG/1
75 TABLET ORAL DAILY
COMMUNITY
Start: 2021-07-29 | End: 2021-08-17

## 2021-08-17 RX ORDER — FUROSEMIDE 40 MG/1
40 TABLET ORAL 2 TIMES DAILY
Qty: 90 TABLET | Refills: 3 | Status: SHIPPED | OUTPATIENT
Start: 2021-08-17

## 2021-08-17 NOTE — PROGRESS NOTES
[facility] [place]  [address] [facility] [place] [address]  Cardiology Follow Up    Opal Gao  1965  [id number]      1  Nonrheumatic aortic valve stenosis  furosemide (LASIX) 40 mg tablet   2  S/P TAVR (transcatheter aortic valve replacement)  metoprolol tartrate (LOPRESSOR) 25 mg tablet    furosemide (LASIX) 40 mg tablet   3  Chronic diastolic heart failure (HCC)  furosemide (LASIX) 40 mg tablet   4  Presence of permanent cardiac pacemaker     5  Essential hypertension     6  Mixed hyperlipidemia         Chief Complaint   Patient presents with    Follow-up       Interval History:    74-year-old female with nonobstructive coronaries, diastolic heart failure, severe aortic stenosis status post TAVR in 2018, status post pacemaker implantation for complete heart block after TAVR, hypertension, hyperlipidemia, history of pericardial window presented for cardiology continuity of care     since last clinic visit patient was started on Norvasc( not sure who started that ) for uncontrolled blood pressure  Patient has notice intermittent leg swelling since then and increased her Lasix to 40 mg daily  For last month or so leg swelling has resolved and has not returned  patient  Also complains of left shoulder pain and unable to elevate left upper extremity above shoulder due to pain which is going on for 2 months  She has not seen any physician for that  The pain is reproducible on elevation of left upper extremity at the level of shoulder      she denies chest pain, palpitation, dizziness, orthopnea, leg edema or loss of consciousness   Exercise tolerance is unchanged since last clinic visit     current medications reviewed   Labs from January 2021 reviewed   LDL 85 , Creatinine 0 8    Review of Systems:   review of system negative except as mentioned above    Patient Active Problem List   Diagnosis    [facility]est[facility]) [facility] [address] )    Hypertension    Moderate to severe aortic stenosis    Aortic stenosis, severe    Coronary artery disease involving native coronary artery of native heart    HLD (hyperlipidemia)    History of Hodgkin's lymphoma    Anxiety    S/P pericardial window creation    S/p TAVR (transcatheter aortic valve replacement), bioprosthetic    CAD (coronary artery disease)    Hyperlipidemia    CHB (complete heart block) (HCC)    Presence of permanent cardiac pacemaker    Encounter for postoperative care     Past Medical History:   Diagnosis Date    Anxiety     CAD (coronary artery disease)     CHF (congestive heart failure) (HCC)     Colon tumor     s/p resection    Hodgkin's disease ([facility] [address] )     age 7, s/p radiation & chemotherapy    Hyperlipidemia     Hypertension     LBBB (left bundle branch block)     Severe aortic stenosis      Social History     Socioeconomic History    Marital status: /Civil Union     Spouse name: Not on file    Number of children: Not on file    Years of education: Not on file    Highest education level: Not on file   Occupational History    Not on file   Tobacco Use    Smoking status: Never Smoker    Smokeless tobacco: Never Used   Substance and Sexual Activity    Alcohol use: No    Drug use: No    Sexual activity: Never     Birth control/protection: Post-menopausal, Female Sterilization   Other Topics Concern    Not on file   Social History Narrative    Not on file     Social Determinants of Health     Financial Resource Strain:     Difficulty of Paying Living Expenses:    Food Insecurity:     Worried About Running Out of Food in the Last Year:     Ran Out of Food in the Last Year:    Transportation Needs:     Lack of Transportation (Medical):      Lack of Transportation (Non-Medical):    Physical Activity:     Days of Exercise per Week:     Minutes of Exercise per Session:    Stress:     Feeling of Stress :    Social Connections:     Frequency of Communication with Friends and Family:     Frequency of Social Gatherings with Friends and Family:     Attends Mosque Services:     Active Member of Clubs or Organizations:     Attends Club or Organization Meetings:     Marital Status:    Intimate Partner Violence:     Fear of Current or Ex-Partner:     Emotionally Abused:     Physically Abused:     Sexually Abused:       Family History   Problem Relation Age of Onset    Hyperlipidemia Mother     Hypertension Mother     Diabetes Mother      Past Surgical History:   Procedure Laterality Date    CARDIAC CATHETERIZATION      COLON SURGERY      non cancerous tumor    NECK SURGERY      PERICARDIAL WINDOW      AZ ECHO TRANSESOPHAG R-T 2D W/PRB IMG ACQUISJ I&R N/A 10/16/2018    Procedure: TRANSESOPHAGEAL ECHOCARDIOGRAM (MICHAEL); Surgeon: Natalia Hall MD;  [facility];  Service: Cardiac Surgery    [place] REPLACE AORTIC VALVE OPENFEMORAL ARTERY APPROACH N/A 10/16/2018    Procedure: REPLACEMENT AORTIC VALVE TRANSCATHETER (TAVR) TRANSFEMORAL W/ 23 MM DE [address] ROSA LEODAN S3 VALVE (ACCESS ON LEFT);   Surgeon: Natalia Hall MD;  [facility];  Service: Cardiac Surgery    REPLACEMENT AORTIC VALVE TRANSCATHETER (TAVR)      TOTAL ABDOMINAL HYSTERECTOMY      TUMOR REMOVAL         Current Outpatient Medications:     acetaminophen (TYLENOL) 325 mg tablet, 650 mg every 4 to 6 hours as needed for pain, Disp: 30 tablet, Rfl: 0    amLODIPine (NORVASC) 10 mg tablet, Take 1 tablet (10 mg total) by mouth daily, Disp: 90 tablet, Rfl: 2    aspirin (ECOTRIN LOW STRENGTH) 81 mg EC tablet, Take 81 mg by mouth daily, Disp: , Rfl:     atorvastatin (LIPITOR) 80 mg tablet, Take 1 tablet (80 mg total) by mouth daily, Disp: 90 tablet, Rfl: 2    b complex-C-folic acid (NEPHRO-ESTER) 0 8 mg tablet, Take 0 8 mg by mouth daily with dinner, Disp: , Rfl:     citalopram (CeleXA) 40 mg tablet, Take 40 mg by mouth daily  , Disp: , Rfl:     lisinopril (ZESTRIL) 5 mg tablet, Take 1 tablet (5 mg total) by mouth daily, Disp: 90 tablet, Rfl: 2    metoprolol tartrate (LOPRESSOR) 25 mg tablet, Take 0 5 tablets (12 5 mg total) by mouth daily, Disp: 90 tablet, Rfl: 3    pantoprazole (PROTONIX) 40 mg tablet, Take 1 tablet (40 mg total) by mouth daily, Disp: 90 tablet, Rfl: 2    potassium chloride (K-DUR,KLOR-CON) 20 mEq tablet, Take 1 tablet (20 mEq total) by mouth daily, Disp: 90 tablet, Rfl: 2    valACYclovir (VALTREX) 500 mg tablet, Take 500 mg by mouth 2 (two) times a day, Disp: , Rfl:     zolpidem (AMBIEN) 10 mg tablet, Take 10 mg by mouth daily, Disp: , Rfl:     furosemide (LASIX) 40 mg tablet, Take 1 tablet (40 mg total) by mouth 2 (two) times a day, Disp: 90 tablet, Rfl: 3  No Known Allergies    Labs:  No visits with results within 6 Month(s) from this visit  Latest known visit with results is:   Orders Only on 01/12/2021   Component Date Value    Glucose, Random 01/12/2021 115*    BUN 01/12/2021 9     Creatinine 01/12/2021 0 80     eGFR Non  01/12/2021 84     eGFR  01/12/2021 97     SL AMB BUN/CREATININE RA* 01/12/2021 11     Sodium 01/12/2021 142     Potassium 01/12/2021 3 4*    Chloride 01/12/2021 98     CO2 01/12/2021 27     CALCIUM 01/12/2021 8 6*    Protein, Total 01/12/2021 7 9     Albumin 01/12/2021 4 4     Globulin, Total 01/12/2021 3 5     Albumin/Globulin Ratio 01/12/2021 1 3     TOTAL BILIRUBIN 01/12/2021 0 4     Alk Phos Isoenzymes 01/12/2021 125*    AST 01/12/2021 21     ALT 01/12/2021 12     Cholesterol, Total 01/12/2021 149     Triglycerides 01/12/2021 112     HDL 01/12/2021 44     VLDL Cholesterol Calcula* 01/12/2021 20     LDL Calculated 01/12/2021 85      Imaging: No results found      Physical Exam:  General:  moderate built, awake, alert and oriented x3, not in distress  Neck: supple, no JVD  Eyes: PERRL, conjunctiva normal  Lungs:  Bilateral air entry positive, no wheeze/rhonchi or crackle  Heart:  S1-S2 normal, no murmur  Abdomen:  Soft ,nondistended ,nontender, bowel sounds positive  Extremities:  No leg edema, no deformity, ROM normal, varicose vein of lower extremity bilaterally   Pain in left shoulder on elevation of left upper extremity  Neuro:  Moving all extremities, speech clear  Skin: warm, no rash    /82 (BP Location: Left arm, Patient Position: Sitting, Cuff Size: Standard)   Pulse 83   Ht 5' 4" (1 626 m)   Wt 84 4 kg (186 lb)   SpO2 96%   BMI 31 93 kg/m²     Cardiographics :  Limited echocardiogram in August 2020 showed EF 60% without regional wall motion abnormality, moderate mitral annular calcification, mild mitral stenosis, mean transmitral gradient 4 mm mercury, bioprosthetic aortic wall functioning normally with mean gradient 17(no significant change in gradient as compared to prior echo), mild TR, trace PI     Previous EKG, sinus rhythm, possible left atrial enlargement, left bundle-branch block     pacemaker interrogation in May 2021 showed normally functioning pacemaker with battery life approximately 12 7 years, no episodes      Assessment:    1  Severe aortic stenosis status post TAVR in 2018  2  Chronic diastolic heart failure  Compensated at present time  3  Complete heart block post TAVR status post permanent pacemaker placement  Pacemaker interrogation in May 2021 showed normal function  4  Hypertension  5  Hyperlipidemia  LDL at goal  6  Left shoulder pain and weakness in left upper extremity likely musculoskeletal in etiology  Patient advised to talk to primary care physician or orthopedic  7  Varicose vein of bilateral lower extremity without any chronic skin changes or ulcer  No leg edema at present    Recommendations:      Patient's lower extremity intermediate edema could be partially from Norvasc  There is room to increase lisinopril and discontinue Norvasc  At present time patient is interested in continue current regimen and she will call us back if she gets frequent leg edema      continue aspirin, statin, Norvasc, metoprolol tartrate, Lasix and lisinopril      advised to talk to primary care physician regarding left shoulder pain  Advised to take low-salt, low-fat/low-cholesterol diet  Follow-up in device Clinic as per schedule   Return to clinic in 6 months or early as needed  Above all discussed with patient    Patient understands and agrees

## 2022-01-27 ENCOUNTER — OFFICE VISIT (OUTPATIENT)
Dept: CARDIOLOGY CLINIC | Facility: CLINIC | Age: 57
End: 2022-01-27
Payer: COMMERCIAL

## 2022-01-27 VITALS
SYSTOLIC BLOOD PRESSURE: 132 MMHG | BODY MASS INDEX: 30.73 KG/M2 | DIASTOLIC BLOOD PRESSURE: 84 MMHG | OXYGEN SATURATION: 98 % | WEIGHT: 180 LBS | RESPIRATION RATE: 16 BRPM | HEART RATE: 87 BPM | HEIGHT: 64 IN

## 2022-01-27 DIAGNOSIS — I50.32 CHRONIC DIASTOLIC HEART FAILURE (HCC): ICD-10-CM

## 2022-01-27 DIAGNOSIS — Z95.0 PACEMAKER: ICD-10-CM

## 2022-01-27 DIAGNOSIS — E78.2 MIXED HYPERLIPIDEMIA: ICD-10-CM

## 2022-01-27 DIAGNOSIS — I10 PRIMARY HYPERTENSION: ICD-10-CM

## 2022-01-27 DIAGNOSIS — Z95.2 S/P TAVR (TRANSCATHETER AORTIC VALVE REPLACEMENT): Primary | ICD-10-CM

## 2022-01-27 PROCEDURE — 99214 OFFICE O/P EST MOD 30 MIN: CPT | Performed by: INTERNAL MEDICINE

## 2022-01-27 NOTE — PROGRESS NOTES
[facility] [place]  [address] [place] [address]  Cardiology Follow Up    Pat Flank  1965  [id number]      1  S/P TAVR (transcatheter aortic valve replacement)     2  Chronic diastolic heart failure ([address] )     3  Pacemaker     4  Primary hypertension     5  Mixed hyperlipidemia     6  BMI 31 0-31 9,adult         Chief Complaint   Patient presents with    Follow-up       Interval History:   17-year-old female with nonobstructive coronaries, diastolic heart failure, severe aortic stenosis status post TAVR in 2018, status post permanent pacemaker for complete heart block after TAVR, hypertension, hyperlipidemia, history of pericardial window presented for cardiology continuity of care    Since last clinic visit patient was diagnosed to have COVID 19 pneumonia  She was at home with COVID-19 for 10 days after which she was not feeling well and needed hospitalization with oxygen requirement for a week  She is recovering from that  Her oxygen requirement has significantly down and now she uses intermittently as needed    She denies chest pain, shortness of breath, dizziness, orthopnea, leg edema, palpitation or loss of conscious  Her shortness of breath on exertion was worse during COVID which has significantly improved    Her Norvasc and lisinopril was discontinued when patient was diagnosed to have COVID-19  She is going for dental extraction  She was also noted to have thrombocytosis which was thought secondary to splenectomy and was started on Eliquis by Hematology  He has appointment with hematologist tomorrow  Since last clinic visit patient has gained weight    Current medications reviewed  Labs from December 2021 reviewed in Care everywhere  Hemoglobin 13 2, normal TSH, creatinine 0 65, AST ALT within normal limits,  total cholesterol 172     Review of Systems:   All review of system negative except as mentioned above    Patient Active Problem List   Diagnosis    CHF (congestive heart failure) (HCC)    Hypertension    Moderate to severe aortic stenosis    Aortic stenosis, severe    Coronary artery disease involving native coronary artery of native heart    HLD (hyperlipidemia)    History of Hodgkin's lymphoma    Anxiety    S/P pericardial window creation    S/p TAVR (transcatheter aortic valve replacement), bioprosthetic    CAD (coronary artery disease)    Hyperlipidemia    CHB (complete heart block) (HCC)    Presence of permanent cardiac pacemaker    Encounter for postoperative care     Past Medical History:   Diagnosis Date    Anxiety     CAD (coronary artery disease)     CHF (congestive heart failure) (HCC)     Colon tumor     s/p resection    Hodgkin's disease ([facility] [facility] )     age 7, s/p radiation & chemotherapy    Hyperlipidemia     Hypertension     LBBB (left bundle branch block)     Severe aortic stenosis      Social History     Socioeconomic History    Marital status: /Civil Union     Spouse name: Not on file    Number of children: Not on file    Years of education: Not on file    Highest education level: Not on file   Occupational History    Not on file   Tobacco Use    Smoking status: Never Smoker    Smokeless tobacco: Never Used   Substance and Sexual Activity    Alcohol use: No    Drug use: No    Sexual activity: Never     Birth control/protection: Post-menopausal, Female Sterilization   Other Topics Concern    Not on file   Social History Narrative    Not on file     Social Determinants of Health     Financial Resource Strain: Not on file   Food Insecurity: Not on file   Transportation Needs: Not on file   Physical Activity: Not on file   Stress: Not on file   Social Connections: Not on file   Intimate Partner Violence: Not on file   Housing Stability: Not on file      Family History   Problem Relation Age of Onset    Hyperlipidemia Mother     Hypertension Mother     Diabetes Mother      Past Surgical History:   Procedure Laterality Date    CARDIAC CATHETERIZATION      COLON SURGERY      non cancerous tumor    NECK SURGERY      PERICARDIAL WINDOW      OK ECHO TRANSESOPHAG R-T 2D W/PRB IMG [id number] I&R N/A 10/16/2018    Procedure: TRANSESOPHAGEAL ECHOCARDIOGRAM (MICHAEL); Surgeon: Kristen Pham MD;  [facility];  Service: Cardiac Surgery    OK REPLACE AORTIC VALVE OPENFEMORAL ARTERY APPROACH N/A 10/16/2018    Procedure: REPLACEMENT AORTIC VALVE TRANSCATHETER (TAVR) TRANSFEMORAL W/ 23 MM [address] [place] [place] S3 VALVE (ACCESS ON LEFT);   Surgeon: Kristen Pham MD;  [facility];  Service: Cardiac Surgery    REPLACEMENT AORTIC VALVE TRANSCATHETER (TAVR)      TOTAL ABDOMINAL HYSTERECTOMY      TUMOR REMOVAL         Current Outpatient Medications:     acetaminophen (TYLENOL) 325 mg tablet, 650 mg every 4 to 6 hours as needed for pain, Disp: 30 tablet, Rfl: 0    aspirin (ECOTRIN LOW STRENGTH) 81 mg EC tablet, Take 81 mg by mouth daily, Disp: , Rfl:     atorvastatin (LIPITOR) 80 mg tablet, Take 1 tablet (80 mg total) by mouth daily, Disp: 90 tablet, Rfl: 2    citalopram (CeleXA) 40 mg tablet, Take 40 mg by mouth daily  , Disp: , Rfl:     furosemide (LASIX) 40 mg tablet, Take 1 tablet (40 mg total) by mouth 2 (two) times a day, Disp: 90 tablet, Rfl: 3    metoprolol tartrate (LOPRESSOR) 25 mg tablet, Take 0 5 tablets (12 5 mg total) by mouth daily, Disp: 90 tablet, Rfl: 3    pantoprazole (PROTONIX) 40 mg tablet, Take 1 tablet (40 mg total) by mouth daily, Disp: 90 tablet, Rfl: 2    potassium chloride (K-DUR,KLOR-CON) 20 mEq tablet, Take 1 tablet (20 mEq total) by mouth daily, Disp: 90 tablet, Rfl: 2    valACYclovir (VALTREX) 500 mg tablet, Take 500 mg by mouth 2 (two) times a day, Disp: , Rfl:     zolpidem (AMBIEN) 10 mg tablet, Take 10 mg by mouth daily, Disp: , Rfl:     b complex-C-folic acid (NEPHRO-ESTER) 0 8 mg tablet, Take 0 8 mg by mouth daily with dinner (Patient not taking: Reported on 1/27/2022 ), Disp: , Rfl:   No Known Allergies    Labs:  No visits with results within 6 Month(s) from this visit  Latest known visit with results is:   Orders Only on 01/12/2021   Component Date Value    Glucose, Random 01/12/2021 115*    BUN 01/12/2021 9     Creatinine 01/12/2021 0 80     eGFR Non  01/12/2021 84     eGFR  01/12/2021 97     SL AMB BUN/CREATININE RA* 01/12/2021 11     Sodium 01/12/2021 142     Potassium 01/12/2021 3 4*    Chloride 01/12/2021 98     CO2 01/12/2021 27     CALCIUM 01/12/2021 8 6*    Protein, Total 01/12/2021 7 9     Albumin 01/12/2021 4 4     Globulin, Total 01/12/2021 3 5     Albumin/Globulin Ratio 01/12/2021 1 3     TOTAL BILIRUBIN 01/12/2021 0 4     Alk Phos Isoenzymes 01/12/2021 125*    AST 01/12/2021 21     ALT 01/12/2021 12     Cholesterol, Total 01/12/2021 149     Triglycerides 01/12/2021 112     HDL 01/12/2021 44     VLDL Cholesterol Calcula* 01/12/2021 20     LDL Calculated 01/12/2021 85      Imaging: No results found      Physical Exam:  General:  Obese, awake, alert and oriented x3, not in distress  Neck: supple, no JVD  Eyes: PERRL, conjunctiva normal  Lungs:  Bilateral air entry positive, no wheeze/rhonchi or crackle  Heart:  S1-S2 normal, no murmur  Abdomen:  Soft ,nondistended ,nontender, bowel sounds positive  Extremities:  No leg edema, no deformity, ROM normal  Neuro:  Moving all extremities, speech clear  Skin: warm, no rash    /84 (BP Location: Left arm, Patient Position: Sitting, Cuff Size: Standard)   Resp 16   Ht 5' 4" (1 626 m)   Wt 81 6 kg (180 lb)   BMI 30 90 kg/m²     Cardiographics :  Limited echocardiogram in August 2020 showed EF 60% without regional wall motion abnormality, moderate mitral annular calcification, mild mitral stenosis, mean transmitral gradient 4 mm mercury, bioprosthetic aortic wall functioning normally with mean gradient 17(no significant change in gradient as compared to prior echo), mild TR, trace PI     Previous EKG, sinus rhythm, possible left atrial enlargement, left bundle-branch block      pacemaker interrogation in May 2021 showed normally functioning pacemaker with battery life approximately 12 7 years, no episodes      Assessment:    1  Severe aortic stenosis status post TAVR in 2018  2  Chronic diastolic heart failure  Compensated at present time  3  Complete heart block status post permanent pacemaker post TAVR  Pacemaker interrogation in May 2021 showed normal function device without any arrhythmia  4  Hypertension  Controlled  5  Hyperlipidemia    Patient will try to lose weight and watch her diet   6  Varicose vein of bilateral lower extremity  7  BMI 31  8  COVID-19 pneumonia in November 2021  9  Thrombocytosis on Eliquis  Being managed by Hematology    Recommendations:    Patient is doing well from cardiology standpoint at present time  Continue aspirin, statin, metoprolol tartrate, Lasix   Patient's leg edema has resolved after Norvasc has been discontinued    Patient can proceed with dental extraction from cardiology standpoint  Patient was advised to monitor blood pressure at home and if it stays more than 130/80 then give us a call which will require titration of antihypertensive    Advised to take low-salt, low-fat/low-cholesterol diet and try to lose weight  Follow-up in device Clinic as per schedule    Return to clinic in 6 months or early as needed  Above all discussed with patient    Patient understands and agrees

## 2022-03-17 ENCOUNTER — REMOTE DEVICE CLINIC VISIT (OUTPATIENT)
Dept: CARDIOLOGY CLINIC | Facility: CLINIC | Age: 57
End: 2022-03-17
Payer: COMMERCIAL

## 2022-03-17 DIAGNOSIS — Z95.0 PRESENCE OF CARDIAC PACEMAKER: Primary | ICD-10-CM

## 2022-03-17 PROCEDURE — 93294 REM INTERROG EVL PM/LDLS PM: CPT | Performed by: INTERNAL MEDICINE

## 2022-03-17 PROCEDURE — 93296 REM INTERROG EVL PM/IDS: CPT | Performed by: INTERNAL MEDICINE

## 2022-04-17 DIAGNOSIS — I49.3 PVC (PREMATURE VENTRICULAR CONTRACTION): ICD-10-CM

## 2022-04-18 RX ORDER — METOPROLOL SUCCINATE 25 MG/1
25 TABLET, EXTENDED RELEASE ORAL DAILY
Qty: 30 TABLET | Refills: 0 | Status: SHIPPED | OUTPATIENT
Start: 2022-04-18

## 2023-06-13 ENCOUNTER — TELEPHONE (OUTPATIENT)
Dept: CARDIOLOGY CLINIC | Facility: CLINIC | Age: 58
End: 2023-06-13

## 2023-06-13 NOTE — TELEPHONE ENCOUNTER
Received fax from [address] for Medical Record Release  Requesting Last Office Note and Testing  Scanned into chart  Faxed last Ov and EP device report to fax # [phone]  Confirmation received

## 2025-02-12 NOTE — LETTER
September 6, 2018     [address]    Patient: Edvin Leone   YOB: 1965   Date of Visit: 9/6/2018       Dear Dr León Buitrago:    Thank you for referring Edvin Leone to me for evaluation  Below are my notes for this consultation  If you have questions, please do not hesitate to call me  I look forward to following your patient along with you  Sincerely,        Arnold Wilson MD        CC: MD Mitch Coats PA-C  9/6/2018  2:33 PM  Attested  Consultation - Cardiothoracic Surgery   Edvin Leone 50 y o  female MRN: [id number]    Physician Requesting Consult: Dr León Buitrago    Reason for Consult / Principal Problem: Aortic stenosis, Non-Rheumatic    History of Present Illness: Edvin Leone is a 50y o  year old female with PMH of Hodgkin's Lymphoma with chemotherapy and radiation to the chest wall at age 7, history of pericardial effusion s/p pericardial window in 2011 at [facility] with post-operative complications necessitating an increased ICU length of stay, chronic diastolic heart failure, HTN, HLD, and anxiety  She was referred to our practice to discuss her treatment options for her known aortic stenosis that was recently found to be worsening after a hospitalization in August 2018 for acute on chronic CHF exacerbation  She had presented with complaints of SOB and LE edema  BNP >1300, CXR showed vascular congestion, EKG (-) for ischemic changes, and troponin (-)  Symptoms resolved after aggressive diuresis  TTE showed moderate-severe aortic stenosis with EF of 50-55%  Patient has followed with cardiologists since discovery and treatment of her pericardial effusion in 2011, seeing them every 6 months with echo  Aortic stenosis had remained mild-moderate during that time  Valvular function has markedly decreased in one years time, since her move from [facility] and subsequent lapse in cardiac care    Patient has no significant family history for heart disease  She ambulates without assistive device and performs ADLs independently  She does not see a dentist regularly  Past Medical History:  Past Medical History:   Diagnosis Date    Anxiety     [facility] [address] [facility]     [facility]     [facility]     Hyperlipidemia     Hypertension          Past Surgical History:   Past Surgical History:   Procedure Laterality Date    CARDIAC SURGERY      COLON SURGERY      non cancerous tumor    HYSTERECTOMY      NECK SURGERY      TUMOR REMOVAL           Family History:  Family History   Problem Relation Age of Onset    Hyperlipidemia Mother     Hypertension Mother     Diabetes Mother          Social History:    History   Alcohol Use No     History   Drug Use No     History   Smoking Status    Never Smoker   Smokeless Tobacco    Never Used       Home Medications:   Prior to Admission medications    Medication Sig Start Date End Date Taking?  Authorizing Provider   amLODIPine (NORVASC) 10 mg tablet Take 10 mg by mouth daily   6/12/18  Yes Historical Provider, MD   aspirin (ECOTRIN LOW STRENGTH) 81 mg EC tablet Take 81 mg by mouth daily   Yes Historical Provider, MD   atorvastatin (LIPITOR) 80 mg tablet Take 80 mg by mouth daily   6/12/18  Yes Historical Provider, MD   citalopram (CeleXA) 40 mg tablet Take 40 mg by mouth daily   6/12/18  Yes Historical Provider, MD   multivitamin (THERAGRAN) TABS Take 1 tablet by mouth daily   Yes Historical Provider, MD   pantoprazole (PROTONIX) 40 mg tablet Take 40 mg by mouth daily   6/12/18  Yes Historical Provider, MD   furosemide (LASIX) 40 mg tablet Take 1 tablet (40 mg total) by mouth daily for 30 days 8/3/18 9/4/18  Nirmal العلي DO       Allergies:  No Known Allergies    Review of Systems:  Review of Systems - History obtained from the patient  General ROS: positive for  - fatigue  negative for - chills or fever  Psychological ROS: positive for - anxiety  negative for - disorientation or hallucinations  ENT ROS: negative for - epistaxis or headaches  Hematological and Lymphatic ROS: negative for - bleeding problems or blood clots  Endocrine ROS: negative for - polydipsia/polyuria or skin changes  Respiratory ROS: positive for - shortness of breath  negative for - cough, hemoptysis or orthopnea  Cardiovascular ROS: positive for - dyspnea on exertion, edema and murmur  negative for - chest pain, irregular heartbeat or loss of consciousness  Gastrointestinal ROS: no abdominal pain, change in bowel habits, or black or bloody stools  Genito-Urinary ROS: no dysuria, trouble voiding, or hematuria  Musculoskeletal ROS: negative  Neurological ROS: no TIA or stroke symptoms  Dermatological ROS: negative    Vital Signs:     Vitals:    09/06/18 1200 09/06/18 1249   BP: 118/68 116/64   BP Location: Left arm Right arm   Cuff Size: Adult    Pulse: 92    Resp: 14    Temp: 99 2 °F (37 3 °C)    TempSrc: Oral    SpO2: 98%    Weight: 78 5 kg (173 lb)    Height: 5' 4" (1 626 m)        Physical Exam:    General: AAOx3, NAD, WD/WN  HEENT/NECK:  PERRLA  No jugular venous distention  Cardiac:Regular rate and rhythm, IV systolic ejection Murmur  Carotid arteries: murmur heard bilaterally, brisk upstroke  Pulmonary:  Breath sounds clear bilaterally  Abdomen:  Non-tender, Non-distended  Positive bowel sounds  Upper extremities: 2+ radial pulses; brisk capillary refill; Lower extremities: Extremities warm/dry  PT/DP pulses 2+ bilaterally  Trace edema B/L  Neuro: Alert and oriented X 3  Sensation is grossly intact  No focal deficits  Musculoskeletal: No gross abnormalities  Skin: Warm/Dry, without rashes or lesions  Imaging Studies:     Cardiac Catheterization: No significant CAD    Echocardiogram:   SUMMARY     PROCEDURE INFORMATION:  This was a technically difficult study    Echocardiographic views were limited due to poor acoustic window availability, decreased penetration, and lung interference      LEFT VENTRICLE:  Systolic function was at the lower limits of normal  Ejection fraction was estimated in the range of 50 % to 55 %  There were no regional wall motion abnormalities  Left ventricular diastolic function parameters were normal      RIGHT VENTRICLE:  The size was normal   Systolic function was normal      MITRAL VALVE:  There was moderate annular calcification  There was mild stenosis  There was trace regurgitation      AORTIC VALVE:  The valve was trileaflet  Leaflets exhibited increased thickness and calcification with reduced cuspal separation  Transaortic velocity was increased due to valvular stenosis  There was moderate to severe stenosis  Peak and mean AV gradients were 50 and 31 mm Hg respectively  GIL by the continuity equation method was 0 6 sq cm  There was mild regurgitation      TRICUSPID VALVE:  There was trace regurgitation  Pulmonary artery systolic pressure was at the upper limits of normal      PULMONIC VALVE:  There was mild to moderate regurgitation  I have personally reviewed pertinent films in PACS    Assessment:  Patient Active Problem List    Diagnosis Date Noted    Moderate to severe aortic stenosis 08/03/2018    CHF (congestive heart failure) ([facility] [address] ) 08/01/2018    Hypertension 08/01/2018     Severe aortic stenosis; Ongoing AVR workup    Plan:  Risks and benefits of aortic valve replacement were discussed in detail today with the patient  They understand and wish to proceed with further workup and ultimately surgical intervention  We have ordered routine preoperative laboratory and vascular studies, such as CTA TAVR protocol and carotid ultrasound  Pending the results of these tests, they will be scheduled for surgery  with TOMI Medrano was comfortable with our recommendations, and their questions were answered to their satisfaction    Thank you for allowing us to participate in the care of this patient  SIGNATURE: Donavon Cordova PA-C  DATE: September 6, 2018  TIME: 1:36 PM  Attestation signed by Miguel Davies MD at 9/6/2018  3:02 PM:  Patient seen and evaluated with Eneida Hannah PA-C  I agree with the above assessment and plan with the following additions  The patient is a 61-year-old female with symptomatic severe aortic stenosis, she also has mild mitral stenosis  She has significant history of Hodgkin's disease with mediastinal radiation when she was 7year-old  I explained the diagnosis to the patient and the treatment options, I recommend aortic valve replacement, because of her age I would favor an open approach with a mechanical valve but because of her history of mediastinal radiation I am going to send a CT of the chest to be sure this option is feasible  The patient will return to my office after preoperative testing were I will formulate final plan  Linda Tidwell MD

## 2025-06-20 ENCOUNTER — TELEPHONE (OUTPATIENT)
Age: 60
End: 2025-06-20

## (undated) DEVICE — CARDIOVASCULAR SPLIT DRAPE: Brand: CONVERTORS

## (undated) DEVICE — INTENDED FOR TISSUE SEPARATION, AND OTHER PROCEDURES THAT REQUIRE A SHARP SURGICAL BLADE TO PUNCTURE OR CUT.: Brand: BARD-PARKER ® CARBON RIB-BACK BLADES

## (undated) DEVICE — TRAY FOLEY 16FR SURESTEP TEMP SENS URIMETER STAT LOK

## (undated) DEVICE — STERILE MAJOR GENERAL PACK: Brand: CARDINAL HEALTH

## (undated) DEVICE — X-RAY DETECTABLE SPONGES,16 PLY: Brand: VISTEC

## (undated) DEVICE — CARDIO PERI-GROIN: Brand: CONVERTORS

## (undated) DEVICE — Device

## (undated) DEVICE — HEAVY DUTY TABLE COVER: Brand: CONVERTORS

## (undated) DEVICE — DRESSING ALLEVYN LIFE SACRAL 6.75 X 6.5 IN

## (undated) DEVICE — DEFIB ADULT ELECTRODE CARDINAL

## (undated) DEVICE — REM POLYHESIVE ADULT PATIENT RETURN ELECTRODE: Brand: VALLEYLAB

## (undated) DEVICE — THERMOFLECT BLANKET, L, 25EA                               TS THERMOFLECT BLANKET, 48" X 84", SILVER, 5/BG, 5 BG/CS NW: Brand: THERMOFLECT

## (undated) DEVICE — DRESSING ALLEVYN LIFE HEEL 25 X 25.2CM

## (undated) DEVICE — GLOVE SRG BIOGEL ECLIPSE 7

## (undated) DEVICE — GLOVE INDICATOR PI UNDERGLOVE SZ 7 BLUE

## (undated) DEVICE — CHLORAPREP HI-LITE 26ML ORANGE

## (undated) DEVICE — 3M™ TEGADERM™ TRANSPARENT FILM DRESSING FRAME STYLE, 1626W, 4 IN X 4-3/4 IN (10 CM X 12 CM), 50/CT 4CT/CASE: Brand: 3M™ TEGADERM™

## (undated) DEVICE — 3000CC GUARDIAN II: Brand: GUARDIAN

## (undated) DEVICE — ADHESIVE SKN CLSR HISTOACRYL FLEX 0.5ML LF

## (undated) DEVICE — SUT SILK 0 CT-1 30 IN 424H

## (undated) DEVICE — GAUZE SPONGES,USP TYPE VII GAUZE, 12 PLY: Brand: CURITY